# Patient Record
Sex: FEMALE | Race: WHITE | NOT HISPANIC OR LATINO | Employment: OTHER | URBAN - NONMETROPOLITAN AREA
[De-identification: names, ages, dates, MRNs, and addresses within clinical notes are randomized per-mention and may not be internally consistent; named-entity substitution may affect disease eponyms.]

---

## 2017-01-28 ENCOUNTER — HOSPITAL ENCOUNTER (EMERGENCY)
Facility: HOSPITAL | Age: 72
Discharge: HOME | End: 2017-01-28
Attending: EMERGENCY MEDICINE | Admitting: EMERGENCY MEDICINE

## 2017-01-28 ENCOUNTER — APPOINTMENT (OUTPATIENT)
Dept: GENERAL RADIOLOGY | Facility: HOSPITAL | Age: 72
End: 2017-01-28

## 2017-01-28 DIAGNOSIS — J20.8 ACUTE BRONCHITIS, VIRAL: Primary | ICD-10-CM

## 2017-01-28 LAB
FLUAV AG NPH QL: NEGATIVE
FLUBV AG NPH QL IA: NEGATIVE

## 2017-01-28 PROCEDURE — 99284 EMERGENCY DEPT VISIT MOD MDM: CPT

## 2017-01-28 PROCEDURE — 25010000002 DEXAMETHASONE PER 1 MG: Performed by: EMERGENCY MEDICINE

## 2017-01-28 PROCEDURE — 87804 INFLUENZA ASSAY W/OPTIC: CPT | Performed by: NURSE PRACTITIONER

## 2017-01-28 PROCEDURE — 71010 XR CHEST 1 VW: CPT | Performed by: RADIOLOGY

## 2017-01-28 PROCEDURE — 96372 THER/PROPH/DIAG INJ SC/IM: CPT

## 2017-01-28 PROCEDURE — 71010 HC CHEST PA OR AP: CPT

## 2017-01-28 PROCEDURE — 94640 AIRWAY INHALATION TREATMENT: CPT

## 2017-01-28 RX ORDER — PREDNISONE 20 MG/1
20 TABLET ORAL 2 TIMES DAILY
Qty: 10 TABLET | Refills: 0 | Status: SHIPPED | OUTPATIENT
Start: 2017-01-28 | End: 2017-02-02

## 2017-01-28 RX ORDER — GUAIFENESIN 600 MG/1
1200 TABLET, EXTENDED RELEASE ORAL 2 TIMES DAILY
Qty: 10 TABLET | Refills: 0 | Status: SHIPPED | OUTPATIENT
Start: 2017-01-28 | End: 2017-04-05

## 2017-01-28 RX ORDER — IPRATROPIUM BROMIDE AND ALBUTEROL SULFATE 2.5; .5 MG/3ML; MG/3ML
3 SOLUTION RESPIRATORY (INHALATION) ONCE
Status: COMPLETED | OUTPATIENT
Start: 2017-01-28 | End: 2017-01-28

## 2017-01-28 RX ORDER — DEXAMETHASONE SODIUM PHOSPHATE 4 MG/ML
8 INJECTION, SOLUTION INTRA-ARTICULAR; INTRALESIONAL; INTRAMUSCULAR; INTRAVENOUS; SOFT TISSUE ONCE
Status: DISCONTINUED | OUTPATIENT
Start: 2017-01-28 | End: 2017-01-28

## 2017-01-28 RX ORDER — ALBUTEROL SULFATE 90 UG/1
2 AEROSOL, METERED RESPIRATORY (INHALATION) EVERY 4 HOURS PRN
COMMUNITY
End: 2017-04-05

## 2017-01-28 RX ORDER — GUAIFENESIN 600 MG/1
600 TABLET, EXTENDED RELEASE ORAL ONCE
Status: COMPLETED | OUTPATIENT
Start: 2017-01-28 | End: 2017-01-28

## 2017-01-28 RX ORDER — METHYLPREDNISOLONE 4 MG/1
4 TABLET ORAL DAILY
COMMUNITY
End: 2017-04-05

## 2017-01-28 RX ORDER — DEXAMETHASONE SODIUM PHOSPHATE 4 MG/ML
8 INJECTION, SOLUTION INTRA-ARTICULAR; INTRALESIONAL; INTRAMUSCULAR; INTRAVENOUS; SOFT TISSUE ONCE
Status: COMPLETED | OUTPATIENT
Start: 2017-01-28 | End: 2017-01-28

## 2017-01-28 RX ADMIN — GUAIFENESIN 600 MG: 600 TABLET, EXTENDED RELEASE ORAL at 22:42

## 2017-01-28 RX ADMIN — IPRATROPIUM BROMIDE AND ALBUTEROL SULFATE 3 ML: .5; 3 SOLUTION RESPIRATORY (INHALATION) at 20:53

## 2017-01-28 RX ADMIN — HYDROCODONE POLISTIREX AND CHLORPHENIRAMINE POLISTIREX 5 ML: 10; 8 SUSPENSION, EXTENDED RELEASE ORAL at 20:56

## 2017-01-28 RX ADMIN — DEXAMETHASONE SODIUM PHOSPHATE 8 MG: 4 INJECTION, SOLUTION INTRAMUSCULAR; INTRAVENOUS at 22:29

## 2017-01-29 VITALS
SYSTOLIC BLOOD PRESSURE: 112 MMHG | HEIGHT: 64 IN | BODY MASS INDEX: 29.02 KG/M2 | TEMPERATURE: 98 F | RESPIRATION RATE: 18 BRPM | HEART RATE: 68 BPM | DIASTOLIC BLOOD PRESSURE: 72 MMHG | OXYGEN SATURATION: 99 % | WEIGHT: 170 LBS

## 2017-02-06 RX ORDER — AMITRIPTYLINE HYDROCHLORIDE 100 MG/1
100 TABLET, FILM COATED ORAL NIGHTLY
Qty: 30 TABLET | Refills: 2 | Status: SHIPPED | OUTPATIENT
Start: 2017-02-06 | End: 2017-03-06 | Stop reason: SDUPTHER

## 2017-02-13 ENCOUNTER — HOSPITAL ENCOUNTER (EMERGENCY)
Facility: HOSPITAL | Age: 72
Discharge: HOME | End: 2017-02-13
Attending: EMERGENCY MEDICINE | Admitting: EMERGENCY MEDICINE

## 2017-02-13 ENCOUNTER — APPOINTMENT (OUTPATIENT)
Dept: GENERAL RADIOLOGY | Facility: HOSPITAL | Age: 72
End: 2017-02-13

## 2017-02-13 VITALS
OXYGEN SATURATION: 99 % | TEMPERATURE: 97.7 F | SYSTOLIC BLOOD PRESSURE: 118 MMHG | HEIGHT: 64 IN | HEART RATE: 78 BPM | BODY MASS INDEX: 29.02 KG/M2 | RESPIRATION RATE: 16 BRPM | DIASTOLIC BLOOD PRESSURE: 58 MMHG | WEIGHT: 170 LBS

## 2017-02-13 DIAGNOSIS — R07.89 ATYPICAL CHEST PAIN: Primary | ICD-10-CM

## 2017-02-13 LAB
ALBUMIN SERPL-MCNC: 4 G/DL (ref 3.4–4.8)
ALBUMIN/GLOB SERPL: 1.3 G/DL (ref 1.5–2.5)
ALP SERPL-CCNC: 72 U/L (ref 46–116)
ALT SERPL W P-5'-P-CCNC: 27 U/L (ref 10–36)
ANION GAP SERPL CALCULATED.3IONS-SCNC: 4.6 MMOL/L (ref 3.6–11.2)
AST SERPL-CCNC: 26 U/L (ref 10–30)
BASOPHILS # BLD AUTO: 0.04 10*3/MM3 (ref 0–0.3)
BASOPHILS NFR BLD AUTO: 0.4 % (ref 0–2)
BILIRUB SERPL-MCNC: 0.3 MG/DL (ref 0.2–1.8)
BNP SERPL-MCNC: 69 PG/ML (ref 0–100)
BUN BLD-MCNC: 16 MG/DL (ref 7–21)
BUN/CREAT SERPL: 23.5 (ref 7–25)
CALCIUM SPEC-SCNC: 10.1 MG/DL (ref 7.7–10)
CHLORIDE SERPL-SCNC: 109 MMOL/L (ref 99–112)
CO2 SERPL-SCNC: 27.4 MMOL/L (ref 24.3–31.9)
CREAT BLD-MCNC: 0.68 MG/DL (ref 0.43–1.29)
DEPRECATED RDW RBC AUTO: 44.4 FL (ref 37–54)
EOSINOPHIL # BLD AUTO: 0.18 10*3/MM3 (ref 0–0.7)
EOSINOPHIL NFR BLD AUTO: 2 % (ref 0–7)
ERYTHROCYTE [DISTWIDTH] IN BLOOD BY AUTOMATED COUNT: 14.6 % (ref 11.5–14.5)
GFR SERPL CREATININE-BSD FRML MDRD: 85 ML/MIN/1.73
GLOBULIN UR ELPH-MCNC: 3 GM/DL
GLUCOSE BLD-MCNC: 98 MG/DL (ref 70–110)
HCT VFR BLD AUTO: 37.9 % (ref 37–47)
HGB BLD-MCNC: 12.4 G/DL (ref 12–16)
IMM GRANULOCYTES # BLD: 0.04 10*3/MM3 (ref 0–0.03)
IMM GRANULOCYTES NFR BLD: 0.4 % (ref 0–0.5)
LIPASE SERPL-CCNC: 30 U/L (ref 13–60)
LYMPHOCYTES # BLD AUTO: 3.27 10*3/MM3 (ref 1–3)
LYMPHOCYTES NFR BLD AUTO: 36.3 % (ref 16–46)
MCH RBC QN AUTO: 27.7 PG (ref 27–33)
MCHC RBC AUTO-ENTMCNC: 32.7 G/DL (ref 33–37)
MCV RBC AUTO: 84.6 FL (ref 80–94)
MONOCYTES # BLD AUTO: 0.65 10*3/MM3 (ref 0.1–0.9)
MONOCYTES NFR BLD AUTO: 7.2 % (ref 0–12)
NEUTROPHILS # BLD AUTO: 4.84 10*3/MM3 (ref 1.4–6.5)
NEUTROPHILS NFR BLD AUTO: 53.7 % (ref 40–75)
OSMOLALITY SERPL CALC.SUM OF ELEC: 282.4 MOSM/KG (ref 273–305)
PLATELET # BLD AUTO: 170 10*3/MM3 (ref 130–400)
PMV BLD AUTO: 10.9 FL (ref 6–10)
POTASSIUM BLD-SCNC: 4.2 MMOL/L (ref 3.5–5.3)
PROT SERPL-MCNC: 7 G/DL (ref 6–8)
RBC # BLD AUTO: 4.48 10*6/MM3 (ref 4.2–5.4)
SODIUM BLD-SCNC: 141 MMOL/L (ref 135–153)
TROPONIN I SERPL-MCNC: 0.01 NG/ML
WBC NRBC COR # BLD: 9.02 10*3/MM3 (ref 4.5–12.5)

## 2017-02-13 PROCEDURE — 99284 EMERGENCY DEPT VISIT MOD MDM: CPT

## 2017-02-13 PROCEDURE — 25010000002 MORPHINE PER 10 MG: Performed by: EMERGENCY MEDICINE

## 2017-02-13 PROCEDURE — 93005 ELECTROCARDIOGRAM TRACING: CPT | Performed by: EMERGENCY MEDICINE

## 2017-02-13 PROCEDURE — 93010 ELECTROCARDIOGRAM REPORT: CPT | Performed by: INTERNAL MEDICINE

## 2017-02-13 PROCEDURE — 25010000002 ONDANSETRON PER 1 MG: Performed by: EMERGENCY MEDICINE

## 2017-02-13 PROCEDURE — 85025 COMPLETE CBC W/AUTO DIFF WBC: CPT | Performed by: EMERGENCY MEDICINE

## 2017-02-13 PROCEDURE — 96374 THER/PROPH/DIAG INJ IV PUSH: CPT

## 2017-02-13 PROCEDURE — 96376 TX/PRO/DX INJ SAME DRUG ADON: CPT

## 2017-02-13 PROCEDURE — 96375 TX/PRO/DX INJ NEW DRUG ADDON: CPT

## 2017-02-13 PROCEDURE — 83690 ASSAY OF LIPASE: CPT | Performed by: EMERGENCY MEDICINE

## 2017-02-13 PROCEDURE — 84484 ASSAY OF TROPONIN QUANT: CPT | Performed by: EMERGENCY MEDICINE

## 2017-02-13 PROCEDURE — 80053 COMPREHEN METABOLIC PANEL: CPT | Performed by: EMERGENCY MEDICINE

## 2017-02-13 PROCEDURE — 71010 HC CHEST PA OR AP: CPT

## 2017-02-13 PROCEDURE — 71010 XR CHEST 1 VW: CPT | Performed by: RADIOLOGY

## 2017-02-13 PROCEDURE — 83880 ASSAY OF NATRIURETIC PEPTIDE: CPT | Performed by: EMERGENCY MEDICINE

## 2017-02-13 RX ORDER — SODIUM CHLORIDE 0.9 % (FLUSH) 0.9 %
10 SYRINGE (ML) INJECTION AS NEEDED
Status: DISCONTINUED | OUTPATIENT
Start: 2017-02-13 | End: 2017-02-13 | Stop reason: HOSPADM

## 2017-02-13 RX ORDER — ONDANSETRON 2 MG/ML
4 INJECTION INTRAMUSCULAR; INTRAVENOUS ONCE
Status: COMPLETED | OUTPATIENT
Start: 2017-02-13 | End: 2017-02-13

## 2017-02-13 RX ORDER — NAPROXEN 500 MG/1
500 TABLET ORAL 2 TIMES DAILY PRN
Qty: 18 TABLET | Refills: 0 | Status: ON HOLD | OUTPATIENT
Start: 2017-02-13 | End: 2017-09-29

## 2017-02-13 RX ADMIN — MORPHINE SULFATE 4 MG: 4 INJECTION, SOLUTION INTRAMUSCULAR; INTRAVENOUS at 17:35

## 2017-02-13 RX ADMIN — ONDANSETRON 4 MG: 2 INJECTION, SOLUTION INTRAMUSCULAR; INTRAVENOUS at 17:35

## 2017-02-13 RX ADMIN — MORPHINE SULFATE 4 MG: 4 INJECTION, SOLUTION INTRAMUSCULAR; INTRAVENOUS at 18:54

## 2017-02-13 NOTE — ED PROVIDER NOTES
Subjective   Patient is a 71 y.o. female presenting with chest pain.   History provided by:  Patient  Chest Pain   Pain quality: sharp    Pain radiates to:  Does not radiate  Pain severity:  Mild  Onset quality:  Sudden  Timing:  Sporadic  Progression:  Partially resolved  Chronicity:  Recurrent  Context: breathing and movement    Relieved by:  Leaning forward  Associated symptoms: shortness of breath    Associated symptoms: no abdominal pain and no fever        Review of Systems   Constitutional: Negative.  Negative for fever.   HENT: Negative.    Respiratory: Positive for shortness of breath.    Cardiovascular: Positive for chest pain.   Gastrointestinal: Negative.  Negative for abdominal pain.   Endocrine: Negative.    Genitourinary: Negative.  Negative for dysuria.   Skin: Negative.    Neurological: Negative.    Psychiatric/Behavioral: Negative.    All other systems reviewed and are negative.      Past Medical History   Diagnosis Date   • Anxiety    • Degenerative disorder of bone    • Depression    • Disease of thyroid gland    • Fibromyalgia    • Hyperlipidemia        Allergies   Allergen Reactions   • Procaine        Past Surgical History   Procedure Laterality Date   • Cardiac catheterization     • Cholecystectomy     • Hysterectomy     • Endoscopy N/A 7/5/2016     Procedure: ESOPHAGOGASTRODUODENOSCOPY;  Surgeon: Ziggy Sprague III, MD;  Location: [facility] OR;  Service:    • Endoscopy N/A 7/5/2016     Procedure: ESOPHAGOGASTRODUODENOSCOPY WITH DILATATION;  Surgeon: Ziggy Sprague III, MD;  Location: [facility] OR;  Service:    • Cardiac catheterization     • Appendectomy         Family History   Problem Relation Age of Onset   • Cancer Mother      Pancreatic cancer   • Cancer Father    • Heart disease Brother        Social History     Social History   • Marital status: Single     Spouse name: N/A   • Number of children: N/A   • Years of education: N/A     Social History Main Topics   • Smoking status:  Never Smoker   • Smokeless tobacco: Not on file   • Alcohol use No   • Drug use: No   • Sexual activity: Defer     Other Topics Concern   • Not on file     Social History Narrative   • No narrative on file           Objective   Physical Exam   Constitutional: She is oriented to person, place, and time. She appears well-developed and well-nourished. No distress.   HENT:   Head: Normocephalic and atraumatic.   Right Ear: External ear normal.   Left Ear: External ear normal.   Nose: Nose normal.   Eyes: Conjunctivae and EOM are normal. Pupils are equal, round, and reactive to light.   Neck: Normal range of motion. Neck supple. No JVD present. No tracheal deviation present.   Cardiovascular: Normal rate, regular rhythm and normal heart sounds.    No murmur heard.  Pulmonary/Chest: Effort normal and breath sounds normal. No respiratory distress. She has no wheezes.   Abdominal: Soft. Bowel sounds are normal. There is no tenderness.   Musculoskeletal: Normal range of motion. She exhibits no edema or deformity.   Neurological: She is alert and oriented to person, place, and time. No cranial nerve deficit.   Skin: Skin is warm and dry. No rash noted. She is not diaphoretic. No erythema. No pallor.   Psychiatric: She has a normal mood and affect. Her behavior is normal. Thought content normal.   Nursing note and vitals reviewed.      Procedures         ED Course  ED Course   Comment By Time   EKG 16:25 NSR, old inferior MI,moderate T abnormality, similar to old EKG Gabe Delaney MD 02/13 1819                  [facility]    Final diagnoses:   Atypical chest pain            Gabe Delaney MD  02/13/17 9185

## 2017-02-23 ENCOUNTER — TRANSCRIBE ORDERS (OUTPATIENT)
Dept: ADMINISTRATIVE | Facility: HOSPITAL | Age: 72
End: 2017-02-23

## 2017-02-23 DIAGNOSIS — S09.90XS HEADACHES DUE TO OLD HEAD INJURY: Primary | ICD-10-CM

## 2017-02-23 DIAGNOSIS — G44.309 HEADACHES DUE TO OLD HEAD INJURY: Primary | ICD-10-CM

## 2017-02-24 ENCOUNTER — HOSPITAL ENCOUNTER (EMERGENCY)
Facility: HOSPITAL | Age: 72
Discharge: HOME | End: 2017-02-24
Admitting: EMERGENCY MEDICINE

## 2017-02-24 ENCOUNTER — APPOINTMENT (OUTPATIENT)
Dept: CT IMAGING | Facility: HOSPITAL | Age: 72
End: 2017-02-24

## 2017-02-24 ENCOUNTER — APPOINTMENT (OUTPATIENT)
Dept: GENERAL RADIOLOGY | Facility: HOSPITAL | Age: 72
End: 2017-02-24

## 2017-02-24 VITALS
WEIGHT: 172 LBS | HEIGHT: 64 IN | BODY MASS INDEX: 29.37 KG/M2 | OXYGEN SATURATION: 99 % | TEMPERATURE: 98.1 F | SYSTOLIC BLOOD PRESSURE: 105 MMHG | RESPIRATION RATE: 20 BRPM | DIASTOLIC BLOOD PRESSURE: 66 MMHG | HEART RATE: 81 BPM

## 2017-02-24 DIAGNOSIS — G45.9 TRANSIENT CEREBRAL ISCHEMIA, UNSPECIFIED TYPE: Primary | ICD-10-CM

## 2017-02-24 LAB
A-A DO2: 4.6 MMHG (ref 0–300)
ALBUMIN SERPL-MCNC: 4 G/DL (ref 3.4–4.8)
ALBUMIN/GLOB SERPL: 1.5 G/DL (ref 1.5–2.5)
ALP SERPL-CCNC: 74 U/L (ref 35–104)
ALT SERPL W P-5'-P-CCNC: 30 U/L (ref 10–36)
AMPHET+METHAMPHET UR QL: NEGATIVE
ANION GAP SERPL CALCULATED.3IONS-SCNC: 2.8 MMOL/L (ref 3.6–11.2)
APTT PPP: <23 SECONDS (ref 24.4–31)
ARTERIAL PATENCY WRIST A: ABNORMAL
AST SERPL-CCNC: 31 U/L (ref 10–30)
ATMOSPHERIC PRESS: 723 MMHG
BARBITURATES UR QL SCN: NEGATIVE
BASE EXCESS BLDA CALC-SCNC: 1.2 MMOL/L
BASOPHILS # BLD AUTO: 0.02 10*3/MM3 (ref 0–0.3)
BASOPHILS NFR BLD AUTO: 0.3 % (ref 0–2)
BDY SITE: ABNORMAL
BENZODIAZ UR QL SCN: POSITIVE
BILIRUB SERPL-MCNC: 0.2 MG/DL (ref 0.2–1.8)
BILIRUB UR QL STRIP: NEGATIVE
BODY TEMPERATURE: 98.6 C
BUN BLD-MCNC: 21 MG/DL (ref 7–21)
BUN/CREAT SERPL: 26.9 (ref 7–25)
CALCIUM SPEC-SCNC: 9.1 MG/DL (ref 7.7–10)
CANNABINOIDS SERPL QL: NEGATIVE
CHLORIDE SERPL-SCNC: 107 MMOL/L (ref 99–112)
CLARITY UR: CLEAR
CO2 SERPL-SCNC: 30.2 MMOL/L (ref 24.3–31.9)
COCAINE UR QL: NEGATIVE
COHGB MFR BLD: 1.5 % (ref 0–5)
COLOR UR: YELLOW
CREAT BLD-MCNC: 0.78 MG/DL (ref 0.43–1.29)
DEPRECATED RDW RBC AUTO: 45.8 FL (ref 37–54)
EOSINOPHIL # BLD AUTO: 0.38 10*3/MM3 (ref 0–0.7)
EOSINOPHIL NFR BLD AUTO: 5.8 % (ref 0–7)
ERYTHROCYTE [DISTWIDTH] IN BLOOD BY AUTOMATED COUNT: 14.7 % (ref 11.5–14.5)
GFR SERPL CREATININE-BSD FRML MDRD: 73 ML/MIN/1.73
GLOBULIN UR ELPH-MCNC: 2.6 GM/DL
GLUCOSE BLD-MCNC: 118 MG/DL (ref 70–110)
GLUCOSE UR STRIP-MCNC: NEGATIVE MG/DL
HCO3 BLDA-SCNC: 27.7 MMOL/L (ref 22–26)
HCT VFR BLD AUTO: 37.3 % (ref 37–47)
HCT VFR BLD CALC: 37 % (ref 37–47)
HGB BLD-MCNC: 11.6 G/DL (ref 12–16)
HGB BLDA-MCNC: 12.5 G/DL (ref 12–16)
HGB UR QL STRIP.AUTO: NEGATIVE
HOROWITZ INDEX BLD+IHG-RTO: 21 %
IMM GRANULOCYTES # BLD: 0.03 10*3/MM3 (ref 0–0.03)
IMM GRANULOCYTES NFR BLD: 0.5 % (ref 0–0.5)
INR PPP: 0.89 (ref 0.8–1.1)
KETONES UR QL STRIP: NEGATIVE
LEUKOCYTE ESTERASE UR QL STRIP.AUTO: NEGATIVE
LYMPHOCYTES # BLD AUTO: 2.18 10*3/MM3 (ref 1–3)
LYMPHOCYTES NFR BLD AUTO: 33.5 % (ref 16–46)
MCH RBC QN AUTO: 27 PG (ref 27–33)
MCHC RBC AUTO-ENTMCNC: 31.1 G/DL (ref 33–37)
MCV RBC AUTO: 86.9 FL (ref 80–94)
METHADONE UR QL SCN: NEGATIVE
METHGB BLD QL: 0.2 % (ref 0–3)
MODALITY: ABNORMAL
MONOCYTES # BLD AUTO: 0.47 10*3/MM3 (ref 0.1–0.9)
MONOCYTES NFR BLD AUTO: 7.2 % (ref 0–12)
NEUTROPHILS # BLD AUTO: 3.42 10*3/MM3 (ref 1.4–6.5)
NEUTROPHILS NFR BLD AUTO: 52.7 % (ref 40–75)
NITRITE UR QL STRIP: NEGATIVE
OPIATES UR QL: POSITIVE
OSMOLALITY SERPL CALC.SUM OF ELEC: 283.5 MOSM/KG (ref 273–305)
OXYCODONE UR QL SCN: POSITIVE
OXYHGB MFR BLDV: 93.1 % (ref 85–100)
PCO2 BLDA: 52.1 MM HG (ref 35–45)
PCP UR QL SCN: NEGATIVE
PH BLDA: 7.34 PH UNITS (ref 7.35–7.45)
PH UR STRIP.AUTO: <=5 [PH] (ref 5–8)
PLATELET # BLD AUTO: 120 10*3/MM3 (ref 130–400)
PMV BLD AUTO: 11.9 FL (ref 6–10)
PO2 BLDA: 75 MM HG (ref 80–100)
POTASSIUM BLD-SCNC: 4 MMOL/L (ref 3.5–5.3)
PROPOXYPH UR QL: NEGATIVE
PROT SERPL-MCNC: 6.6 G/DL (ref 6–8)
PROT UR QL STRIP: NEGATIVE
PROTHROMBIN TIME: 10 SECONDS (ref 9.8–11.9)
RBC # BLD AUTO: 4.29 10*6/MM3 (ref 4.2–5.4)
SAO2 % BLDCOA: 94.7 % (ref 90–100)
SODIUM BLD-SCNC: 140 MMOL/L (ref 135–153)
SP GR UR STRIP: 1.02 (ref 1–1.03)
UROBILINOGEN UR QL STRIP: NORMAL
WBC NRBC COR # BLD: 6.5 10*3/MM3 (ref 4.5–12.5)

## 2017-02-24 PROCEDURE — 80307 DRUG TEST PRSMV CHEM ANLYZR: CPT | Performed by: NURSE PRACTITIONER

## 2017-02-24 PROCEDURE — 82375 ASSAY CARBOXYHB QUANT: CPT | Performed by: NURSE PRACTITIONER

## 2017-02-24 PROCEDURE — 70450 CT HEAD/BRAIN W/O DYE: CPT

## 2017-02-24 PROCEDURE — 99285 EMERGENCY DEPT VISIT HI MDM: CPT

## 2017-02-24 PROCEDURE — 82805 BLOOD GASES W/O2 SATURATION: CPT | Performed by: NURSE PRACTITIONER

## 2017-02-24 PROCEDURE — 85025 COMPLETE CBC W/AUTO DIFF WBC: CPT | Performed by: NURSE PRACTITIONER

## 2017-02-24 PROCEDURE — 85730 THROMBOPLASTIN TIME PARTIAL: CPT | Performed by: NURSE PRACTITIONER

## 2017-02-24 PROCEDURE — 93005 ELECTROCARDIOGRAM TRACING: CPT | Performed by: NURSE PRACTITIONER

## 2017-02-24 PROCEDURE — 81003 URINALYSIS AUTO W/O SCOPE: CPT | Performed by: NURSE PRACTITIONER

## 2017-02-24 PROCEDURE — 80053 COMPREHEN METABOLIC PANEL: CPT | Performed by: NURSE PRACTITIONER

## 2017-02-24 PROCEDURE — 36600 WITHDRAWAL OF ARTERIAL BLOOD: CPT | Performed by: NURSE PRACTITIONER

## 2017-02-24 PROCEDURE — 71010 HC CHEST AP: CPT

## 2017-02-24 PROCEDURE — 83050 HGB METHEMOGLOBIN QUAN: CPT | Performed by: NURSE PRACTITIONER

## 2017-02-24 PROCEDURE — 71010 XR CHEST AP: CPT | Performed by: RADIOLOGY

## 2017-02-24 PROCEDURE — 70450 CT HEAD/BRAIN W/O DYE: CPT | Performed by: RADIOLOGY

## 2017-02-24 PROCEDURE — 85610 PROTHROMBIN TIME: CPT | Performed by: NURSE PRACTITIONER

## 2017-02-24 RX ORDER — SODIUM CHLORIDE 0.9 % (FLUSH) 0.9 %
10 SYRINGE (ML) INJECTION AS NEEDED
Status: DISCONTINUED | OUTPATIENT
Start: 2017-02-24 | End: 2017-02-24 | Stop reason: HOSPADM

## 2017-02-25 ENCOUNTER — HOSPITAL ENCOUNTER (OUTPATIENT)
Dept: MRI IMAGING | Facility: HOSPITAL | Age: 72
Discharge: HOME | End: 2017-02-25
Attending: FAMILY MEDICINE | Admitting: FAMILY MEDICINE

## 2017-02-25 DIAGNOSIS — S09.90XS HEADACHES DUE TO OLD HEAD INJURY: ICD-10-CM

## 2017-02-25 DIAGNOSIS — G44.309 HEADACHES DUE TO OLD HEAD INJURY: ICD-10-CM

## 2017-02-25 PROCEDURE — 70551 MRI BRAIN STEM W/O DYE: CPT

## 2017-02-25 PROCEDURE — 70551 MRI BRAIN STEM W/O DYE: CPT | Performed by: RADIOLOGY

## 2017-03-01 ENCOUNTER — TELEPHONE (OUTPATIENT)
Dept: PSYCHIATRY | Facility: CLINIC | Age: 72
End: 2017-03-01

## 2017-03-01 NOTE — TELEPHONE ENCOUNTER
Indigo called and said that she missed her last appt with you due to her having a stroke and she still can't walk. She has made an appointment with you in April. She just wanted you to know why she didn't show for her appt.

## 2017-03-06 RX ORDER — QUETIAPINE FUMARATE 200 MG/1
200 TABLET, FILM COATED ORAL NIGHTLY
Qty: 30 TABLET | Refills: 2 | Status: SHIPPED | OUTPATIENT
Start: 2017-03-06 | End: 2017-06-06 | Stop reason: SDUPTHER

## 2017-03-06 RX ORDER — AMITRIPTYLINE HYDROCHLORIDE 100 MG/1
100 TABLET, FILM COATED ORAL NIGHTLY
Qty: 30 TABLET | Refills: 2 | Status: SHIPPED | OUTPATIENT
Start: 2017-03-06 | End: 2017-06-06 | Stop reason: SDUPTHER

## 2017-04-05 ENCOUNTER — OFFICE VISIT (OUTPATIENT)
Dept: PSYCHIATRY | Facility: CLINIC | Age: 72
End: 2017-04-05

## 2017-04-05 VITALS
HEIGHT: 64 IN | DIASTOLIC BLOOD PRESSURE: 67 MMHG | WEIGHT: 185 LBS | HEART RATE: 84 BPM | BODY MASS INDEX: 31.58 KG/M2 | SYSTOLIC BLOOD PRESSURE: 125 MMHG

## 2017-04-05 DIAGNOSIS — F41.1 GENERALIZED ANXIETY DISORDER: ICD-10-CM

## 2017-04-05 DIAGNOSIS — F33.3 MAJOR DEPRESSIVE DISORDER, RECURRENT, SEVERE WITH PSYCHOTIC FEATURES (HCC): Primary | ICD-10-CM

## 2017-04-05 PROCEDURE — 99214 OFFICE O/P EST MOD 30 MIN: CPT | Performed by: PSYCHIATRY & NEUROLOGY

## 2017-04-05 RX ORDER — POTASSIUM CHLORIDE 750 MG/1
TABLET, FILM COATED, EXTENDED RELEASE ORAL
Refills: 5 | Status: ON HOLD | COMMUNITY
Start: 2017-03-16 | End: 2017-09-29

## 2017-04-05 RX ORDER — RANITIDINE 150 MG/1
150 TABLET ORAL DAILY PRN
Refills: 3 | Status: ON HOLD | COMMUNITY
Start: 2017-02-02 | End: 2018-05-16

## 2017-04-05 RX ORDER — FUROSEMIDE 40 MG/1
40 TABLET ORAL DAILY
Refills: 5 | Status: ON HOLD | COMMUNITY
Start: 2017-03-16 | End: 2017-12-05

## 2017-04-05 NOTE — PROGRESS NOTES
"Subjective   Patient ID: Indigo Sorenson is a 71 y.o. female is here today for follow-up.    /67  Pulse 84  Ht 64\" (162.6 cm)  Wt 185 lb (83.9 kg)  BMI 31.76 kg/m2    Procaine    History of Present Illness The patient states that she is feeling stressed because of her son, who [facility] past [facility] issues. She is worried about him losing his [employer] benefits. She is blaming his girlfriend for it and she has caused too many problems and she is also using drugs and slipped bath salts to her son and he was admitted to the [facility] in [place]. She is worried that her son will lose his [employer] pension due to the legal issues and then his daughter will suffer as she may lose her college money from the [employer].  She is agreed to make an effort to not worry too much as he is an adult and he has to face consequences of his actions. She also agreed to continue her medications to help her during this difficult time.   The following portions of the patient's history were reviewed and updated as appropriate: allergies, current medications and problem list.    Review of Systems   Constitutional: Negative.    Eyes: Negative.    Respiratory: Negative.      PFSH: The patient lives in [facility] and has been there for last 9 years. Her son lives in [facility] but is currently incarcerated, and her granddaughter lives in [place], [place].    Objective   Mental Status Exam  Appearance:  clean and casually dressed, appropriate  Attitude toward clinician:  cooperative and agreeable   Speech:    Rate:  regular rate and rhythm   Volume:  normal  Motor:  no abnormal movements present  Mood:  Anxious  Affect:  euthymic  Thought Processes:  linear, logical, and goal directed  Thought Content:  normal  Suicidal Thoughts:  absent  Homicidal Thoughts:  absent  Perceptual Disturbance: no perceptual disturbance  Attention and Concentration:  good  Insight and Judgement:  good  Memory:  memory " appears to be intact    MEDICATION ISSUES:    Lab Review:   Admission on 02/24/2017, Discharged on 02/24/2017   Component Date Value   • Site 02/24/2017 Arterial: left brachial    • David's Test 02/24/2017 N/A    • pH, Arterial 02/24/2017 7.343*   • pCO2, Arterial 02/24/2017 52.1*   • pO2, Arterial 02/24/2017 75.0*   • HCO3, Arterial 02/24/2017 27.7*   • Base Excess, Arterial 02/24/2017 1.2    • O2 Saturation, Arterial 02/24/2017 94.7    • Hemoglobin, Blood Gas 02/24/2017 12.5    • Hematocrit, Blood Gas 02/24/2017 37.0    • Oxyhemoglobin 02/24/2017 93.1    • Methemoglobin 02/24/2017 0.2    • Carboxyhemoglobin 02/24/2017 1.5    • A-a Gradiant 02/24/2017 4.6    • Temperature 02/24/2017 98.6    • Barometric Pressure for * 02/24/2017 723    • Modality 02/24/2017 Room air    • FIO2 02/24/2017 21    • Glucose 02/24/2017 118*   • BUN 02/24/2017 21    • Creatinine 02/24/2017 0.78    • Sodium 02/24/2017 140    • Potassium 02/24/2017 4.0    • Chloride 02/24/2017 107    • CO2 02/24/2017 30.2    • Calcium 02/24/2017 9.1    • Total Protein 02/24/2017 6.6    • Albumin 02/24/2017 4.00    • ALT (SGPT) 02/24/2017 30    • AST (SGOT) 02/24/2017 31*   • Alkaline Phosphatase 02/24/2017 74    • Total Bilirubin 02/24/2017 0.2    • eGFR Non  Amer 02/24/2017 73    • Globulin 02/24/2017 2.6    • A/G Ratio 02/24/2017 1.5    • BUN/Creatinine Ratio 02/24/2017 26.9*   • Anion Gap 02/24/2017 2.8*   • Protime 02/24/2017 10.0    • INR 02/24/2017 0.89    • PTT 02/24/2017 <23.0*   • Color, UA 02/24/2017 Yellow    • Appearance, UA 02/24/2017 Clear    • pH, UA 02/24/2017 <=5.0    • Specific Gravity, UA 02/24/2017 1.025    • Glucose, UA 02/24/2017 Negative    • Ketones, UA 02/24/2017 Negative    • Bilirubin, UA 02/24/2017 Negative    • Blood, UA 02/24/2017 Negative    • Protein, UA 02/24/2017 Negative    • Leuk Esterase, UA 02/24/2017 Negative    • Nitrite, UA 02/24/2017 Negative    • Urobilinogen, UA 02/24/2017 0.2 E.U./dL    • Amphetamine  Screen, Urine 02/24/2017 Negative    • Barbiturates Screen, Uri* 02/24/2017 Negative    • Benzodiazepine Screen, U* 02/24/2017 Positive*   • Cocaine Screen, Urine 02/24/2017 Negative    • Methadone Screen, Urine 02/24/2017 Negative    • Opiate Screen 02/24/2017 Positive*   • Phencyclidine (PCP), Uri* 02/24/2017 Negative    • Propoxyphene Screen 02/24/2017 Negative    • THC, Screen, Urine 02/24/2017 Negative    • WBC 02/24/2017 6.50    • RBC 02/24/2017 4.29    • Hemoglobin 02/24/2017 11.6*   • Hematocrit 02/24/2017 37.3    • MCV 02/24/2017 86.9    • MCH 02/24/2017 27.0    • MCHC 02/24/2017 31.1*   • RDW 02/24/2017 14.7*   • RDW-SD 02/24/2017 45.8    • MPV 02/24/2017 11.9*   • Platelets 02/24/2017 120*   • Neutrophil % 02/24/2017 52.7    • Lymphocyte % 02/24/2017 33.5    • Monocyte % 02/24/2017 7.2    • Eosinophil % 02/24/2017 5.8    • Basophil % 02/24/2017 0.3    • Immature Grans % 02/24/2017 0.5    • Neutrophils, Absolute 02/24/2017 3.42    • Lymphocytes, Absolute 02/24/2017 2.18    • Monocytes, Absolute 02/24/2017 0.47    • Eosinophils, Absolute 02/24/2017 0.38    • Basophils, Absolute 02/24/2017 0.02    • Immature Grans, Absolute 02/24/2017 0.03    • Osmolality Calc 02/24/2017 283.5    • Oxycodone Screen, Urine 02/24/2017 Positive*   Admission on 02/13/2017, Discharged on 02/13/2017   Component Date Value   • Glucose 02/13/2017 98    • BUN 02/13/2017 16    • Creatinine 02/13/2017 0.68    • Sodium 02/13/2017 141    • Potassium 02/13/2017 4.2    • Chloride 02/13/2017 109    • CO2 02/13/2017 27.4    • Calcium 02/13/2017 10.1*   • Total Protein 02/13/2017 7.0    • Albumin 02/13/2017 4.00    • ALT (SGPT) 02/13/2017 27    • AST (SGOT) 02/13/2017 26    • Alkaline Phosphatase 02/13/2017 72    • Total Bilirubin 02/13/2017 0.3    • eGFR Non  Amer 02/13/2017 85    • Globulin 02/13/2017 3.0    • A/G Ratio 02/13/2017 1.3*   • BUN/Creatinine Ratio 02/13/2017 23.5    • Anion Gap 02/13/2017 4.6    • BNP 02/13/2017 69.0     • Lipase 02/13/2017 30    • Troponin I 02/13/2017 0.014    • WBC 02/13/2017 9.02    • RBC 02/13/2017 4.48    • Hemoglobin 02/13/2017 12.4    • Hematocrit 02/13/2017 37.9    • MCV 02/13/2017 84.6    • MCH 02/13/2017 27.7    • MCHC 02/13/2017 32.7*   • RDW 02/13/2017 14.6*   • RDW-SD 02/13/2017 44.4    • MPV 02/13/2017 10.9*   • Platelets 02/13/2017 170    • Neutrophil % 02/13/2017 53.7    • Lymphocyte % 02/13/2017 36.3    • Monocyte % 02/13/2017 7.2    • Eosinophil % 02/13/2017 2.0    • Basophil % 02/13/2017 0.4    • Immature Grans % 02/13/2017 0.4    • Neutrophils, Absolute 02/13/2017 4.84    • Lymphocytes, Absolute 02/13/2017 3.27*   • Monocytes, Absolute 02/13/2017 0.65    • Eosinophils, Absolute 02/13/2017 0.18    • Basophils, Absolute 02/13/2017 0.04    • Immature Grans, Absolute 02/13/2017 0.04*   • Osmolality Calc 02/13/2017 282.4      Assessment/Plan   Diagnoses and all orders for this visit:    Major depressive disorder, recurrent, severe with psychotic features    Generalized anxiety disorder      Return in about 2 months (around 6/5/2017).

## 2017-06-06 ENCOUNTER — OFFICE VISIT (OUTPATIENT)
Dept: PSYCHIATRY | Facility: CLINIC | Age: 72
End: 2017-06-06

## 2017-06-06 VITALS
DIASTOLIC BLOOD PRESSURE: 69 MMHG | SYSTOLIC BLOOD PRESSURE: 114 MMHG | HEIGHT: 64 IN | WEIGHT: 187 LBS | HEART RATE: 68 BPM | BODY MASS INDEX: 31.92 KG/M2

## 2017-06-06 DIAGNOSIS — F41.1 GENERALIZED ANXIETY DISORDER: ICD-10-CM

## 2017-06-06 DIAGNOSIS — F33.3 MAJOR DEPRESSIVE DISORDER, RECURRENT, SEVERE WITH PSYCHOTIC FEATURES (HCC): Primary | ICD-10-CM

## 2017-06-06 PROCEDURE — 99213 OFFICE O/P EST LOW 20 MIN: CPT | Performed by: PSYCHIATRY & NEUROLOGY

## 2017-06-06 RX ORDER — NEOMYCIN SULFATE, POLYMYXIN B SULFATE AND DEXAMETHASONE 3.5; 10000; 1 MG/ML; [USP'U]/ML; MG/ML
SUSPENSION/ DROPS OPHTHALMIC
Refills: 1 | Status: ON HOLD | COMMUNITY
Start: 2017-05-31 | End: 2017-09-29

## 2017-06-06 RX ORDER — LOTEPREDNOL ETABONATE 5 MG/G
GEL OPHTHALMIC
Refills: 0 | Status: ON HOLD | COMMUNITY
Start: 2017-05-31 | End: 2017-09-29

## 2017-06-06 RX ORDER — QUETIAPINE FUMARATE 300 MG/1
300 TABLET, FILM COATED ORAL NIGHTLY
Qty: 30 TABLET | Refills: 2 | Status: SHIPPED | OUTPATIENT
Start: 2017-06-06 | End: 2017-08-29 | Stop reason: SDUPTHER

## 2017-06-06 RX ORDER — AMITRIPTYLINE HYDROCHLORIDE 100 MG/1
100 TABLET, FILM COATED ORAL NIGHTLY
Qty: 30 TABLET | Refills: 2 | Status: SHIPPED | OUTPATIENT
Start: 2017-06-06 | End: 2017-08-29 | Stop reason: SDUPTHER

## 2017-06-06 RX ORDER — HYDROXYZINE PAMOATE 25 MG/1
CAPSULE ORAL
Refills: 5 | Status: ON HOLD | COMMUNITY
Start: 2017-05-22 | End: 2017-09-29

## 2017-06-06 NOTE — PROGRESS NOTES
"Subjective   Patient ID: Indigo Sorenson is a 72 y.o. female is here today for follow-up.    /69  Pulse 68  Ht 64\" (162.6 cm)  Wt 187 lb (84.8 kg)  BMI 32.1 kg/m2    Procaine    History of Present Illness The patient states that she is worried about her sons who are not doing well health wise and it is affecting her sleep and she doesn't want to be in the state where she will end up in the hospital. She is encouraged to accept her limitations and not try to fix things which are beyond her ability. She also agreed to increase the dose of Seroquel.  The following portions of the patient's history were reviewed and updated as appropriate: allergies, current medications and problem list.    Review of Systems   Constitutional: Negative.    Eyes: Negative.    Respiratory: Negative.      PFSH:     Objective   Mental Status Exam  Appearance:  clean and casually dressed, appropriate  Attitude toward clinician:  cooperative and agreeable   Speech:    Rate:  regular rate and rhythm   Volume:  normal  Motor:  no abnormal movements present  Mood:  Anxious  Affect:  euthymic  Thought Processes:  linear, logical, and goal directed  Thought Content:  normal  Suicidal Thoughts:  absent  Homicidal Thoughts:  absent  Perceptual Disturbance: no perceptual disturbance  Attention and Concentration:  good  Insight and Judgement:  good  Memory:  memory appears to be intact    MEDICATION ISSUES:    Lab Review:   No visits with results within 2 Month(s) from this visit.  Latest known visit with results is:    Admission on 02/24/2017, Discharged on 02/24/2017   Component Date Value   • Site 02/24/2017 Arterial: left brachial    • David's Test 02/24/2017 N/A    • pH, Arterial 02/24/2017 7.343*   • pCO2, Arterial 02/24/2017 52.1*   • pO2, Arterial 02/24/2017 75.0*   • HCO3, Arterial 02/24/2017 27.7*   • Base Excess, Arterial 02/24/2017 1.2    • O2 Saturation, Arterial 02/24/2017 94.7    • Hemoglobin, Blood Gas 02/24/2017 12.5    • " Hematocrit, Blood Gas 02/24/2017 37.0    • Oxyhemoglobin 02/24/2017 93.1    • Methemoglobin 02/24/2017 0.2    • Carboxyhemoglobin 02/24/2017 1.5    • A-a Gradiant 02/24/2017 4.6    • Temperature 02/24/2017 98.6    • Barometric Pressure for * 02/24/2017 723    • Modality 02/24/2017 Room air    • FIO2 02/24/2017 21    • Glucose 02/24/2017 118*   • BUN 02/24/2017 21    • Creatinine 02/24/2017 0.78    • Sodium 02/24/2017 140    • Potassium 02/24/2017 4.0    • Chloride 02/24/2017 107    • CO2 02/24/2017 30.2    • Calcium 02/24/2017 9.1    • Total Protein 02/24/2017 6.6    • Albumin 02/24/2017 4.00    • ALT (SGPT) 02/24/2017 30    • AST (SGOT) 02/24/2017 31*   • Alkaline Phosphatase 02/24/2017 74    • Total Bilirubin 02/24/2017 0.2    • eGFR Non  Amer 02/24/2017 73    • Globulin 02/24/2017 2.6    • A/G Ratio 02/24/2017 1.5    • BUN/Creatinine Ratio 02/24/2017 26.9*   • Anion Gap 02/24/2017 2.8*   • Protime 02/24/2017 10.0    • INR 02/24/2017 0.89    • PTT 02/24/2017 <23.0*   • Color, UA 02/24/2017 Yellow    • Appearance, UA 02/24/2017 Clear    • pH, UA 02/24/2017 <=5.0    • Specific Gravity, UA 02/24/2017 1.025    • Glucose, UA 02/24/2017 Negative    • Ketones, UA 02/24/2017 Negative    • Bilirubin, UA 02/24/2017 Negative    • Blood, UA 02/24/2017 Negative    • Protein, UA 02/24/2017 Negative    • Leuk Esterase, UA 02/24/2017 Negative    • Nitrite, UA 02/24/2017 Negative    • Urobilinogen, UA 02/24/2017 0.2 E.U./dL    • Amphetamine Screen, Urine 02/24/2017 Negative    • Barbiturates Screen, Uri* 02/24/2017 Negative    • Benzodiazepine Screen, U* 02/24/2017 Positive*   • Cocaine Screen, Urine 02/24/2017 Negative    • Methadone Screen, Urine 02/24/2017 Negative    • Opiate Screen 02/24/2017 Positive*   • Phencyclidine (PCP), Uri* 02/24/2017 Negative    • Propoxyphene Screen 02/24/2017 Negative    • THC, Screen, Urine 02/24/2017 Negative    • WBC 02/24/2017 6.50    • RBC 02/24/2017 4.29    • Hemoglobin 02/24/2017  11.6*   • Hematocrit 02/24/2017 37.3    • MCV 02/24/2017 86.9    • MCH 02/24/2017 27.0    • MCHC 02/24/2017 31.1*   • RDW 02/24/2017 14.7*   • RDW-SD 02/24/2017 45.8    • MPV 02/24/2017 11.9*   • Platelets 02/24/2017 120*   • Neutrophil % 02/24/2017 52.7    • Lymphocyte % 02/24/2017 33.5    • Monocyte % 02/24/2017 7.2    • Eosinophil % 02/24/2017 5.8    • Basophil % 02/24/2017 0.3    • Immature Grans % 02/24/2017 0.5    • Neutrophils, Absolute 02/24/2017 3.42    • Lymphocytes, Absolute 02/24/2017 2.18    • Monocytes, Absolute 02/24/2017 0.47    • Eosinophils, Absolute 02/24/2017 0.38    • Basophils, Absolute 02/24/2017 0.02    • Immature Grans, Absolute 02/24/2017 0.03    • Osmolality Calc 02/24/2017 283.5    • Oxycodone Screen, Urine 02/24/2017 Positive*     Assessment/Plan   Diagnoses and all orders for this visit:    Major depressive disorder, recurrent, severe with psychotic features    Generalized anxiety disorder    Other orders  -     QUEtiapine (SEROquel) 300 MG tablet; Take 1 tablet by mouth Every Night.  -     amitriptyline (ELAVIL) 100 MG tablet; Take 1 tablet by mouth Every Night.    Return in about 4 weeks (around 7/4/2017).

## 2017-06-10 ENCOUNTER — APPOINTMENT (OUTPATIENT)
Dept: GENERAL RADIOLOGY | Facility: HOSPITAL | Age: 72
End: 2017-06-10

## 2017-06-10 ENCOUNTER — HOSPITAL ENCOUNTER (EMERGENCY)
Facility: HOSPITAL | Age: 72
Discharge: HOME | End: 2017-06-10
Attending: EMERGENCY MEDICINE | Admitting: EMERGENCY MEDICINE

## 2017-06-10 VITALS
BODY MASS INDEX: 30.73 KG/M2 | HEIGHT: 64 IN | TEMPERATURE: 97.9 F | HEART RATE: 76 BPM | RESPIRATION RATE: 18 BRPM | WEIGHT: 180 LBS | SYSTOLIC BLOOD PRESSURE: 116 MMHG | OXYGEN SATURATION: 98 % | DIASTOLIC BLOOD PRESSURE: 79 MMHG

## 2017-06-10 DIAGNOSIS — R07.89 ATYPICAL CHEST PAIN: Primary | ICD-10-CM

## 2017-06-10 LAB
ALBUMIN SERPL-MCNC: 4.4 G/DL (ref 3.4–4.8)
ALBUMIN/GLOB SERPL: 1.3 G/DL (ref 1.5–2.5)
ALP SERPL-CCNC: 96 U/L (ref 35–104)
ALT SERPL W P-5'-P-CCNC: 31 U/L (ref 10–36)
ANION GAP SERPL CALCULATED.3IONS-SCNC: 4.5 MMOL/L (ref 3.6–11.2)
APTT PPP: <23 SECONDS (ref 24.4–31)
AST SERPL-CCNC: 33 U/L (ref 10–30)
BACTERIA UR QL AUTO: ABNORMAL /HPF
BASOPHILS # BLD AUTO: 0.05 10*3/MM3 (ref 0–0.3)
BASOPHILS NFR BLD AUTO: 0.6 % (ref 0–2)
BILIRUB SERPL-MCNC: 0.3 MG/DL (ref 0.2–1.8)
BILIRUB UR QL STRIP: NEGATIVE
BNP SERPL-MCNC: 67 PG/ML (ref 0–100)
BUN BLD-MCNC: 16 MG/DL (ref 7–21)
BUN/CREAT SERPL: 18.8 (ref 7–25)
CALCIUM SPEC-SCNC: 10 MG/DL (ref 7.7–10)
CHLORIDE SERPL-SCNC: 103 MMOL/L (ref 99–112)
CLARITY UR: CLEAR
CO2 SERPL-SCNC: 31.5 MMOL/L (ref 24.3–31.9)
COLOR UR: YELLOW
CREAT BLD-MCNC: 0.85 MG/DL (ref 0.43–1.29)
DEPRECATED RDW RBC AUTO: 41.3 FL (ref 37–54)
EOSINOPHIL # BLD AUTO: 0.24 10*3/MM3 (ref 0–0.7)
EOSINOPHIL NFR BLD AUTO: 3 % (ref 0–7)
ERYTHROCYTE [DISTWIDTH] IN BLOOD BY AUTOMATED COUNT: 13.7 % (ref 11.5–14.5)
GFR SERPL CREATININE-BSD FRML MDRD: 66 ML/MIN/1.73
GLOBULIN UR ELPH-MCNC: 3.4 GM/DL
GLUCOSE BLD-MCNC: 100 MG/DL (ref 70–110)
GLUCOSE UR STRIP-MCNC: NEGATIVE MG/DL
HCT VFR BLD AUTO: 41.6 % (ref 37–47)
HGB BLD-MCNC: 13.6 G/DL (ref 12–16)
HGB UR QL STRIP.AUTO: NEGATIVE
HYALINE CASTS UR QL AUTO: ABNORMAL /LPF
IMM GRANULOCYTES # BLD: 0.01 10*3/MM3 (ref 0–0.03)
IMM GRANULOCYTES NFR BLD: 0.1 % (ref 0–0.5)
INR PPP: 0.9 (ref 0.8–1.1)
KETONES UR QL STRIP: NEGATIVE
LEUKOCYTE ESTERASE UR QL STRIP.AUTO: ABNORMAL
LYMPHOCYTES # BLD AUTO: 2.51 10*3/MM3 (ref 1–3)
LYMPHOCYTES NFR BLD AUTO: 31.6 % (ref 16–46)
MCH RBC QN AUTO: 27.5 PG (ref 27–33)
MCHC RBC AUTO-ENTMCNC: 32.7 G/DL (ref 33–37)
MCV RBC AUTO: 84 FL (ref 80–94)
MONOCYTES # BLD AUTO: 0.54 10*3/MM3 (ref 0.1–0.9)
MONOCYTES NFR BLD AUTO: 6.8 % (ref 0–12)
NEUTROPHILS # BLD AUTO: 4.6 10*3/MM3 (ref 1.4–6.5)
NEUTROPHILS NFR BLD AUTO: 57.9 % (ref 40–75)
NITRITE UR QL STRIP: NEGATIVE
OSMOLALITY SERPL CALC.SUM OF ELEC: 278.8 MOSM/KG (ref 273–305)
PH UR STRIP.AUTO: <=5 [PH] (ref 5–8)
PLATELET # BLD AUTO: 111 10*3/MM3 (ref 130–400)
PMV BLD AUTO: 12.2 FL (ref 6–10)
POTASSIUM BLD-SCNC: 4.3 MMOL/L (ref 3.5–5.3)
PROT SERPL-MCNC: 7.8 G/DL (ref 6–8)
PROT UR QL STRIP: NEGATIVE
PROTHROMBIN TIME: 9.9 SECONDS (ref 9.8–11.9)
RBC # BLD AUTO: 4.95 10*6/MM3 (ref 4.2–5.4)
RBC # UR: ABNORMAL /HPF
REF LAB TEST METHOD: ABNORMAL
SODIUM BLD-SCNC: 139 MMOL/L (ref 135–153)
SP GR UR STRIP: 1.01 (ref 1–1.03)
SQUAMOUS #/AREA URNS HPF: ABNORMAL /HPF
TROPONIN I SERPL-MCNC: <0.006 NG/ML
TROPONIN I SERPL-MCNC: <0.006 NG/ML
UROBILINOGEN UR QL STRIP: ABNORMAL
WBC NRBC COR # BLD: 7.95 10*3/MM3 (ref 4.5–12.5)
WBC UR QL AUTO: ABNORMAL /HPF

## 2017-06-10 PROCEDURE — 83880 ASSAY OF NATRIURETIC PEPTIDE: CPT | Performed by: EMERGENCY MEDICINE

## 2017-06-10 PROCEDURE — 93010 ELECTROCARDIOGRAM REPORT: CPT | Performed by: INTERNAL MEDICINE

## 2017-06-10 PROCEDURE — 99284 EMERGENCY DEPT VISIT MOD MDM: CPT

## 2017-06-10 PROCEDURE — 25010000002 MORPHINE PER 10 MG: Performed by: EMERGENCY MEDICINE

## 2017-06-10 PROCEDURE — 85025 COMPLETE CBC W/AUTO DIFF WBC: CPT | Performed by: EMERGENCY MEDICINE

## 2017-06-10 PROCEDURE — 85610 PROTHROMBIN TIME: CPT | Performed by: EMERGENCY MEDICINE

## 2017-06-10 PROCEDURE — 96374 THER/PROPH/DIAG INJ IV PUSH: CPT

## 2017-06-10 PROCEDURE — 96375 TX/PRO/DX INJ NEW DRUG ADDON: CPT

## 2017-06-10 PROCEDURE — 93005 ELECTROCARDIOGRAM TRACING: CPT | Performed by: EMERGENCY MEDICINE

## 2017-06-10 PROCEDURE — 71010 HC CHEST PA OR AP: CPT

## 2017-06-10 PROCEDURE — 80053 COMPREHEN METABOLIC PANEL: CPT | Performed by: EMERGENCY MEDICINE

## 2017-06-10 PROCEDURE — 81001 URINALYSIS AUTO W/SCOPE: CPT | Performed by: EMERGENCY MEDICINE

## 2017-06-10 PROCEDURE — 71010 XR CHEST 1 VW: CPT | Performed by: RADIOLOGY

## 2017-06-10 PROCEDURE — 25010000002 MORPHINE SULFATE (PF) 2 MG/ML SOLUTION

## 2017-06-10 PROCEDURE — 25010000002 KETOROLAC TROMETHAMINE PER 15 MG: Performed by: EMERGENCY MEDICINE

## 2017-06-10 PROCEDURE — 85730 THROMBOPLASTIN TIME PARTIAL: CPT | Performed by: EMERGENCY MEDICINE

## 2017-06-10 PROCEDURE — 84484 ASSAY OF TROPONIN QUANT: CPT | Performed by: EMERGENCY MEDICINE

## 2017-06-10 RX ORDER — KETOROLAC TROMETHAMINE 30 MG/ML
15 INJECTION, SOLUTION INTRAMUSCULAR; INTRAVENOUS ONCE
Status: COMPLETED | OUTPATIENT
Start: 2017-06-10 | End: 2017-06-10

## 2017-06-10 RX ORDER — SODIUM CHLORIDE 0.9 % (FLUSH) 0.9 %
10 SYRINGE (ML) INJECTION AS NEEDED
Status: DISCONTINUED | OUTPATIENT
Start: 2017-06-10 | End: 2017-06-10 | Stop reason: HOSPADM

## 2017-06-10 RX ORDER — MORPHINE SULFATE 2 MG/ML
INJECTION, SOLUTION INTRAMUSCULAR; INTRAVENOUS
Status: DISCONTINUED
Start: 2017-06-10 | End: 2017-06-10 | Stop reason: HOSPADM

## 2017-06-10 RX ORDER — OXYCODONE AND ACETAMINOPHEN 10; 325 MG/1; MG/1
1 TABLET ORAL ONCE
Status: COMPLETED | OUTPATIENT
Start: 2017-06-10 | End: 2017-06-10

## 2017-06-10 RX ORDER — CYCLOBENZAPRINE HCL 5 MG
5 TABLET ORAL 3 TIMES DAILY PRN
Qty: 15 TABLET | Refills: 0 | Status: ON HOLD | OUTPATIENT
Start: 2017-06-10 | End: 2017-09-29

## 2017-06-10 RX ADMIN — OXYCODONE HYDROCHLORIDE AND ACETAMINOPHEN 1 TABLET: 10; 325 TABLET ORAL at 18:24

## 2017-06-10 RX ADMIN — KETOROLAC TROMETHAMINE 15 MG: 30 INJECTION, SOLUTION INTRAMUSCULAR; INTRAVENOUS at 19:15

## 2017-06-10 RX ADMIN — MORPHINE SULFATE 4 MG: 4 INJECTION, SOLUTION INTRAMUSCULAR; INTRAVENOUS at 15:58

## 2017-06-10 NOTE — ED NOTES
Pt reports she takes Percocet 10 mg at home and states it is time for her to take her medication.  Dr. Delaney notified.     Edna Krishna RN  06/10/17 0691

## 2017-06-27 NOTE — ED PROVIDER NOTES
Subjective   Patient is a 72 y.o. female presenting with chest pain.   History provided by:  Patient  Chest Pain   Pain location:  Unable to specify  Pain radiates to:  Does not radiate  Pain severity:  Mild  Onset quality:  Gradual  Duration: 6 hours.  Progression:  Waxing and waning  Chronicity:  New  Context: breathing and movement    Relieved by:  Nothing  Worsened by:  Movement and deep breathing  Associated symptoms: no abdominal pain, no anorexia, no anxiety and no fever        Review of Systems   Constitutional: Negative.  Negative for fever.   HENT: Negative.    Respiratory: Negative.    Cardiovascular: Positive for chest pain.   Gastrointestinal: Negative.  Negative for abdominal pain and anorexia.   Endocrine: Negative.    Genitourinary: Negative.  Negative for dysuria.   Skin: Negative.    Neurological: Negative.    Psychiatric/Behavioral: Negative.    All other systems reviewed and are negative.      Past Medical History:   Diagnosis Date   • Anxiety    • Degenerative disorder of bone    • Depression    • Disease of thyroid gland    • Fibromyalgia    • Hyperlipidemia        Allergies   Allergen Reactions   • Procaine        Past Surgical History:   Procedure Laterality Date   • APPENDECTOMY     • CARDIAC CATHETERIZATION     • CARDIAC CATHETERIZATION     • CHOLECYSTECTOMY     • ENDOSCOPY N/A 7/5/2016    Procedure: ESOPHAGOGASTRODUODENOSCOPY;  Surgeon: Ziggy Sprague III, MD;  Location: [facility];  Service:    • ENDOSCOPY N/A 7/5/2016    Procedure: ESOPHAGOGASTRODUODENOSCOPY WITH DILATATION;  Surgeon: Ziggy Sprague III, MD;  Location: [facility];  Service:    • HYSTERECTOMY         Family History   Problem Relation Age of Onset   • Cancer Mother      Pancreatic cancer   • Cancer Father    • Heart disease Brother        Social History     Social History   • Marital status: Single     Spouse name: N/A   • Number of children: N/A   • Years of education: N/A     Social History Main Topics   •  Smoking status: Never Smoker   • Smokeless tobacco: None   • Alcohol use No   • Drug use: No   • Sexual activity: Defer     Other Topics Concern   • None     Social History Narrative           Objective   Physical Exam   Constitutional: She is oriented to person, place, and time. She appears well-developed and well-nourished. No distress.   HENT:   Head: Normocephalic and atraumatic.   Right Ear: External ear normal.   Left Ear: External ear normal.   Nose: Nose normal.   Eyes: Conjunctivae and EOM are normal. Pupils are equal, round, and reactive to light.   Neck: Normal range of motion. Neck supple. No JVD present. No tracheal deviation present.   Cardiovascular: Normal rate, regular rhythm and normal heart sounds.    No murmur heard.  Pulmonary/Chest: Effort normal and breath sounds normal. No respiratory distress. She has no wheezes.   Abdominal: Soft. Bowel sounds are normal. There is no tenderness.   Musculoskeletal: Normal range of motion. She exhibits no edema or deformity.   Neurological: She is alert and oriented to person, place, and time. No cranial nerve deficit.   Skin: Skin is warm and dry. No rash noted. She is not diaphoretic. No erythema. No pallor.   Psychiatric: She has a normal mood and affect. Her behavior is normal. Thought content normal.   Nursing note and vitals reviewed.      Procedures         ED Course  ED Course                  MDM    Final diagnoses:   Atypical chest pain            Gabe Delaney MD  06/27/17 9614

## 2017-07-12 ENCOUNTER — OFFICE VISIT (OUTPATIENT)
Dept: PSYCHIATRY | Facility: CLINIC | Age: 72
End: 2017-07-12

## 2017-07-12 VITALS
WEIGHT: 186 LBS | HEIGHT: 64 IN | HEART RATE: 70 BPM | BODY MASS INDEX: 31.76 KG/M2 | SYSTOLIC BLOOD PRESSURE: 121 MMHG | DIASTOLIC BLOOD PRESSURE: 73 MMHG

## 2017-07-12 DIAGNOSIS — F33.3 MAJOR DEPRESSIVE DISORDER, RECURRENT, SEVERE WITH PSYCHOTIC FEATURES (HCC): Primary | ICD-10-CM

## 2017-07-12 DIAGNOSIS — F41.1 GENERALIZED ANXIETY DISORDER: ICD-10-CM

## 2017-07-12 PROCEDURE — 99213 OFFICE O/P EST LOW 20 MIN: CPT | Performed by: PSYCHIATRY & NEUROLOGY

## 2017-07-12 RX ORDER — MIRTAZAPINE 7.5 MG/1
7.5 TABLET, FILM COATED ORAL NIGHTLY
Qty: 30 TABLET | Refills: 0 | Status: SHIPPED | OUTPATIENT
Start: 2017-07-12 | End: 2017-08-11

## 2017-07-12 RX ORDER — MIRTAZAPINE 15 MG/1
15 TABLET, ORALLY DISINTEGRATING ORAL NIGHTLY
Qty: 30 TABLET | Refills: 0 | Status: SHIPPED | OUTPATIENT
Start: 2017-07-12 | End: 2017-07-12

## 2017-07-12 NOTE — PROGRESS NOTES
"Subjective   Patient ID: Indigo Sorenson is a 72 y.o. female is here today for follow-up.    /73  Pulse 70  Ht 64\" (162.6 cm)  Wt 186 lb (84.4 kg)  BMI 31.93 kg/m2    Procaine    History of Present Illness The patient states that she is not able to sleep good and has difficulty falling asleep and then wakes up early. She denies any current stressors. She agreed to add Remeron to her medication regimen.  The following portions of the patient's history were reviewed and updated as appropriate: allergies, current medications and problem list.    Review of Systems   Constitutional: Negative.    Eyes: Negative.    Respiratory: Negative.      PFSH:     Objective   Mental Status Exam  Appearance:  clean and casually dressed, appropriate  Attitude toward clinician:  cooperative and agreeable   Speech:    Rate:  regular rate and rhythm   Volume:  normal  Motor:  no abnormal movements present  Mood:  Anxious  Affect:  euthymic  Thought Processes:  linear, logical, and goal directed  Thought Content:  normal  Suicidal Thoughts:  absent  Homicidal Thoughts:  absent  Perceptual Disturbance: no perceptual disturbance  Attention and Concentration:  good  Insight and Judgement:  good  Memory:  memory appears to be intact    MEDICATION ISSUES:    Lab Review:   Admission on 06/10/2017, Discharged on 06/10/2017   Component Date Value   • Glucose 06/10/2017 100    • BUN 06/10/2017 16    • Creatinine 06/10/2017 0.85    • Sodium 06/10/2017 139    • Potassium 06/10/2017 4.3    • Chloride 06/10/2017 103    • CO2 06/10/2017 31.5    • Calcium 06/10/2017 10.0    • Total Protein 06/10/2017 7.8    • Albumin 06/10/2017 4.40    • ALT (SGPT) 06/10/2017 31    • AST (SGOT) 06/10/2017 33*   • Alkaline Phosphatase 06/10/2017 96    • Total Bilirubin 06/10/2017 0.3    • eGFR Non  Amer 06/10/2017 66    • Globulin 06/10/2017 3.4    • A/G Ratio 06/10/2017 1.3*   • BUN/Creatinine Ratio 06/10/2017 18.8    • Anion Gap 06/10/2017 4.5    • Protime " 06/10/2017 9.9    • INR 06/10/2017 0.90    • PTT 06/10/2017 <23.0*   • BNP 06/10/2017 67.0    • Color, UA 06/10/2017 Yellow    • Appearance, UA 06/10/2017 Clear    • pH, UA 06/10/2017 <=5.0    • Specific Gravity, UA 06/10/2017 1.010    • Glucose, UA 06/10/2017 Negative    • Ketones, UA 06/10/2017 Negative    • Bilirubin, UA 06/10/2017 Negative    • Blood, UA 06/10/2017 Negative    • Protein, UA 06/10/2017 Negative    • Leuk Esterase, UA 06/10/2017 Trace*   • Nitrite, UA 06/10/2017 Negative    • Urobilinogen, UA 06/10/2017 0.2 E.U./dL    • Troponin I 06/10/2017 <0.006    • WBC 06/10/2017 7.95    • RBC 06/10/2017 4.95    • Hemoglobin 06/10/2017 13.6    • Hematocrit 06/10/2017 41.6    • MCV 06/10/2017 84.0    • MCH 06/10/2017 27.5    • MCHC 06/10/2017 32.7*   • RDW 06/10/2017 13.7    • RDW-SD 06/10/2017 41.3    • MPV 06/10/2017 12.2*   • Platelets 06/10/2017 111*   • Neutrophil % 06/10/2017 57.9    • Lymphocyte % 06/10/2017 31.6    • Monocyte % 06/10/2017 6.8    • Eosinophil % 06/10/2017 3.0    • Basophil % 06/10/2017 0.6    • Immature Grans % 06/10/2017 0.1    • Neutrophils, Absolute 06/10/2017 4.60    • Lymphocytes, Absolute 06/10/2017 2.51    • Monocytes, Absolute 06/10/2017 0.54    • Eosinophils, Absolute 06/10/2017 0.24    • Basophils, Absolute 06/10/2017 0.05    • Immature Grans, Absolute 06/10/2017 0.01    • Osmolality Calc 06/10/2017 278.8    • RBC, UA 06/10/2017 0-2*   • WBC, UA 06/10/2017 0-2*   • Bacteria, UA 06/10/2017 None Seen    • Squamous Epithelial Cell* 06/10/2017 0-2    • Hyaline Casts, UA 06/10/2017 None Seen    • Methodology 06/10/2017 Automated Microscopy    • Troponin I 06/10/2017 <0.006      Assessment/Plan   Diagnoses and all orders for this visit:    Major depressive disorder, recurrent, severe with psychotic features    Generalized anxiety disorder    Other orders  -     Discontinue: mirtazapine (REMERON SOL-TAB) 15 MG disintegrating tablet; Take 1 tablet by mouth Every Night.  -      mirtazapine (REMERON) 7.5 MG tablet; Take 1 tablet by mouth Every Night for 30 days.      Return in about 4 weeks (around 8/9/2017).

## 2017-08-29 RX ORDER — AMITRIPTYLINE HYDROCHLORIDE 100 MG/1
100 TABLET, FILM COATED ORAL NIGHTLY
Qty: 30 TABLET | Refills: 2 | Status: SHIPPED | OUTPATIENT
Start: 2017-08-29 | End: 2017-12-12 | Stop reason: SDUPTHER

## 2017-08-29 RX ORDER — MIRTAZAPINE 7.5 MG/1
7.5 TABLET, FILM COATED ORAL NIGHTLY
Qty: 30 TABLET | Refills: 2 | Status: SHIPPED | OUTPATIENT
Start: 2017-08-29 | End: 2017-12-12 | Stop reason: SDUPTHER

## 2017-08-29 RX ORDER — QUETIAPINE FUMARATE 300 MG/1
300 TABLET, FILM COATED ORAL NIGHTLY
Qty: 30 TABLET | Refills: 2 | Status: SHIPPED | OUTPATIENT
Start: 2017-08-29 | End: 2017-12-12 | Stop reason: SDUPTHER

## 2017-08-29 NOTE — TELEPHONE ENCOUNTER
Discontinue: mirtazapine (REMERON SOL-TAB) 15 MG disintegrating tablet; Take 1 tablet by mouth Every Night.  -     mirtazapine (REMERON) 7.5 MG tablet; Take 1 tablet by mouth Every Night for 30 days.        Patient requested Remeron 7.5mg last office note stated you decreased dose.

## 2017-09-19 ENCOUNTER — HOSPITAL ENCOUNTER (EMERGENCY)
Facility: HOSPITAL | Age: 72
Discharge: HOME | End: 2017-09-19
Attending: EMERGENCY MEDICINE | Admitting: EMERGENCY MEDICINE

## 2017-09-19 ENCOUNTER — APPOINTMENT (OUTPATIENT)
Dept: GENERAL RADIOLOGY | Facility: HOSPITAL | Age: 72
End: 2017-09-19

## 2017-09-19 VITALS
SYSTOLIC BLOOD PRESSURE: 136 MMHG | TEMPERATURE: 98.1 F | RESPIRATION RATE: 16 BRPM | WEIGHT: 184 LBS | HEIGHT: 64 IN | BODY MASS INDEX: 31.41 KG/M2 | HEART RATE: 73 BPM | DIASTOLIC BLOOD PRESSURE: 83 MMHG | OXYGEN SATURATION: 100 %

## 2017-09-19 DIAGNOSIS — R55 NEAR SYNCOPE: Primary | ICD-10-CM

## 2017-09-19 LAB
ALBUMIN SERPL-MCNC: 4.2 G/DL (ref 3.4–4.8)
ALBUMIN/GLOB SERPL: 1.5 G/DL (ref 1.5–2.5)
ALP SERPL-CCNC: 94 U/L (ref 35–104)
ALT SERPL W P-5'-P-CCNC: 30 U/L (ref 10–36)
AMYLASE SERPL-CCNC: 64 U/L (ref 28–100)
ANION GAP SERPL CALCULATED.3IONS-SCNC: 6.4 MMOL/L (ref 3.6–11.2)
AST SERPL-CCNC: 29 U/L (ref 10–30)
BASOPHILS # BLD AUTO: 0.03 10*3/MM3 (ref 0–0.3)
BASOPHILS NFR BLD AUTO: 0.3 % (ref 0–2)
BILIRUB SERPL-MCNC: 0.2 MG/DL (ref 0.2–1.8)
BUN BLD-MCNC: 13 MG/DL (ref 7–21)
BUN/CREAT SERPL: 16.5 (ref 7–25)
CALCIUM SPEC-SCNC: 9.3 MG/DL (ref 7.7–10)
CHLORIDE SERPL-SCNC: 107 MMOL/L (ref 99–112)
CK MB SERPL-CCNC: 1.75 NG/ML (ref 0–5)
CO2 SERPL-SCNC: 23.6 MMOL/L (ref 24.3–31.9)
CREAT BLD-MCNC: 0.79 MG/DL (ref 0.43–1.29)
DEPRECATED RDW RBC AUTO: 45.4 FL (ref 37–54)
EOSINOPHIL # BLD AUTO: 0.37 10*3/MM3 (ref 0–0.7)
EOSINOPHIL NFR BLD AUTO: 4 % (ref 0–7)
ERYTHROCYTE [DISTWIDTH] IN BLOOD BY AUTOMATED COUNT: 14.6 % (ref 11.5–14.5)
GFR SERPL CREATININE-BSD FRML MDRD: 72 ML/MIN/1.73
GLOBULIN UR ELPH-MCNC: 2.8 GM/DL
GLUCOSE BLD-MCNC: 96 MG/DL (ref 70–110)
HCT VFR BLD AUTO: 37.3 % (ref 37–47)
HGB BLD-MCNC: 12.2 G/DL (ref 12–16)
IMM GRANULOCYTES # BLD: 0.07 10*3/MM3 (ref 0–0.03)
IMM GRANULOCYTES NFR BLD: 0.8 % (ref 0–0.5)
LIPASE SERPL-CCNC: 36 U/L (ref 13–60)
LYMPHOCYTES # BLD AUTO: 3.9 10*3/MM3 (ref 1–3)
LYMPHOCYTES NFR BLD AUTO: 42 % (ref 16–46)
MCH RBC QN AUTO: 27.6 PG (ref 27–33)
MCHC RBC AUTO-ENTMCNC: 32.7 G/DL (ref 33–37)
MCV RBC AUTO: 84.4 FL (ref 80–94)
MONOCYTES # BLD AUTO: 0.54 10*3/MM3 (ref 0.1–0.9)
MONOCYTES NFR BLD AUTO: 5.8 % (ref 0–12)
NEUTROPHILS # BLD AUTO: 4.37 10*3/MM3 (ref 1.4–6.5)
NEUTROPHILS NFR BLD AUTO: 47.1 % (ref 40–75)
OSMOLALITY SERPL CALC.SUM OF ELEC: 273.8 MOSM/KG (ref 273–305)
PLATELET # BLD AUTO: 159 10*3/MM3 (ref 130–400)
PMV BLD AUTO: 12.2 FL (ref 6–10)
POTASSIUM BLD-SCNC: 3.7 MMOL/L (ref 3.5–5.3)
PROT SERPL-MCNC: 7 G/DL (ref 6–8)
RBC # BLD AUTO: 4.42 10*6/MM3 (ref 4.2–5.4)
SODIUM BLD-SCNC: 137 MMOL/L (ref 135–153)
TROPONIN I SERPL-MCNC: <0.006 NG/ML
WBC NRBC COR # BLD: 9.28 10*3/MM3 (ref 4.5–12.5)

## 2017-09-19 PROCEDURE — 93005 ELECTROCARDIOGRAM TRACING: CPT | Performed by: EMERGENCY MEDICINE

## 2017-09-19 PROCEDURE — 93010 ELECTROCARDIOGRAM REPORT: CPT | Performed by: INTERNAL MEDICINE

## 2017-09-19 PROCEDURE — 71010 HC CHEST PA OR AP: CPT

## 2017-09-19 PROCEDURE — 85025 COMPLETE CBC W/AUTO DIFF WBC: CPT | Performed by: EMERGENCY MEDICINE

## 2017-09-19 PROCEDURE — 84484 ASSAY OF TROPONIN QUANT: CPT | Performed by: EMERGENCY MEDICINE

## 2017-09-19 PROCEDURE — 96374 THER/PROPH/DIAG INJ IV PUSH: CPT

## 2017-09-19 PROCEDURE — 99284 EMERGENCY DEPT VISIT MOD MDM: CPT

## 2017-09-19 PROCEDURE — 94640 AIRWAY INHALATION TREATMENT: CPT

## 2017-09-19 PROCEDURE — 82150 ASSAY OF AMYLASE: CPT | Performed by: EMERGENCY MEDICINE

## 2017-09-19 PROCEDURE — 82553 CREATINE MB FRACTION: CPT | Performed by: EMERGENCY MEDICINE

## 2017-09-19 PROCEDURE — 80053 COMPREHEN METABOLIC PANEL: CPT | Performed by: EMERGENCY MEDICINE

## 2017-09-19 PROCEDURE — 71010 XR CHEST 1 VW: CPT | Performed by: RADIOLOGY

## 2017-09-19 PROCEDURE — 83690 ASSAY OF LIPASE: CPT | Performed by: EMERGENCY MEDICINE

## 2017-09-19 PROCEDURE — 94799 UNLISTED PULMONARY SVC/PX: CPT

## 2017-09-19 PROCEDURE — 25010000002 MORPHINE PER 10 MG: Performed by: EMERGENCY MEDICINE

## 2017-09-19 RX ORDER — IPRATROPIUM BROMIDE AND ALBUTEROL SULFATE 2.5; .5 MG/3ML; MG/3ML
3 SOLUTION RESPIRATORY (INHALATION) ONCE
Status: COMPLETED | OUTPATIENT
Start: 2017-09-19 | End: 2017-09-19

## 2017-09-19 RX ORDER — SODIUM CHLORIDE 0.9 % (FLUSH) 0.9 %
10 SYRINGE (ML) INJECTION AS NEEDED
Status: DISCONTINUED | OUTPATIENT
Start: 2017-09-19 | End: 2017-09-20 | Stop reason: HOSPADM

## 2017-09-19 RX ADMIN — MORPHINE SULFATE 4 MG: 4 INJECTION, SOLUTION INTRAMUSCULAR; INTRAVENOUS at 22:44

## 2017-09-19 RX ADMIN — IPRATROPIUM BROMIDE AND ALBUTEROL SULFATE 3 ML: .5; 3 SOLUTION RESPIRATORY (INHALATION) at 22:01

## 2017-09-20 NOTE — ED PROVIDER NOTES
Subjective   Patient is a 72 y.o. female presenting with syncope.   Syncope   Episode history:  Single  Most recent episode:  Today  Progression:  Resolved  Chronicity:  Recurrent  Context: standing up    Relieved by:  Lying down  Worsened by:  Posture  Ineffective treatments:  None tried  Associated symptoms: anxiety    Associated symptoms: no chest pain and no fever        Review of Systems   Constitutional: Negative.  Negative for fever.   HENT: Negative.    Respiratory: Negative.    Cardiovascular: Positive for syncope. Negative for chest pain.   Gastrointestinal: Negative.  Negative for abdominal pain.   Endocrine: Negative.    Genitourinary: Negative.  Negative for dysuria.   Skin: Negative.    Psychiatric/Behavioral: The patient is nervous/anxious.    All other systems reviewed and are negative.      Past Medical History:   Diagnosis Date   • Anxiety    • Degenerative disorder of bone    • Depression    • Disease of thyroid gland    • Fibromyalgia    • Hyperlipidemia        Allergies   Allergen Reactions   • Procaine        Past Surgical History:   Procedure Laterality Date   • APPENDECTOMY     • CARDIAC CATHETERIZATION     • CARDIAC CATHETERIZATION     • CHOLECYSTECTOMY     • ENDOSCOPY N/A 7/5/2016    Procedure: ESOPHAGOGASTRODUODENOSCOPY;  Surgeon: Ziggy Sprague III, MD;  Location: [facility];  Service:    • ENDOSCOPY N/A 7/5/2016    Procedure: ESOPHAGOGASTRODUODENOSCOPY WITH DILATATION;  Surgeon: Ziggy Sprague III, MD;  Location: [facility];  Service:    • HYSTERECTOMY         Family History   Problem Relation Age of Onset   • Cancer Mother      Pancreatic cancer   • Cancer Father    • Heart disease Brother        Social History     Social History   • Marital status: Single     Spouse name: N/A   • Number of children: N/A   • Years of education: N/A     Social History Main Topics   • Smoking status: Never Smoker   • Smokeless tobacco: Not on file   • Alcohol use No   • Drug use: No   • Sexual  activity: Defer     Other Topics Concern   • Not on file     Social History Narrative           Objective   Physical Exam   Constitutional: She is oriented to person, place, and time. She appears well-developed and well-nourished. No distress.   HENT:   Head: Normocephalic and atraumatic.   Right Ear: External ear normal.   Left Ear: External ear normal.   Nose: Nose normal.   Eyes: Conjunctivae and EOM are normal. Pupils are equal, round, and reactive to light.   Neck: Normal range of motion. Neck supple. No JVD present. No tracheal deviation present.   Cardiovascular: Normal rate, regular rhythm and normal heart sounds.    No murmur heard.  Pulmonary/Chest: Effort normal and breath sounds normal. No respiratory distress. She has no wheezes.   Abdominal: Soft. Bowel sounds are normal. There is no tenderness.   Musculoskeletal: Normal range of motion. She exhibits no edema or deformity.   Neurological: She is alert and oriented to person, place, and time. No cranial nerve deficit.   Skin: Skin is warm and dry. No rash noted. She is not diaphoretic. No erythema. No pallor.   Psychiatric: She has a normal mood and affect. Her behavior is normal. Thought content normal.   Nursing note and vitals reviewed.      Procedures         ED Course  ED Course   Comment By Time   EKG NSR 75 rate, non-specific ST-T abnormalities Gabe Delaney MD 09/19 4984                  [facility]    Final diagnoses:   Near syncope            Gabe Delaney MD  09/19/17 5565

## 2017-09-29 ENCOUNTER — APPOINTMENT (OUTPATIENT)
Dept: CT IMAGING | Facility: HOSPITAL | Age: 72
End: 2017-09-29

## 2017-09-29 ENCOUNTER — APPOINTMENT (OUTPATIENT)
Dept: GENERAL RADIOLOGY | Facility: HOSPITAL | Age: 72
End: 2017-09-29

## 2017-09-29 ENCOUNTER — HOSPITAL ENCOUNTER (INPATIENT)
Facility: HOSPITAL | Age: 72
LOS: 3 days | Discharge: HOME | End: 2017-10-02
Attending: EMERGENCY MEDICINE | Admitting: FAMILY MEDICINE

## 2017-09-29 DIAGNOSIS — R07.9 CHEST PAIN, UNSPECIFIED TYPE: Primary | ICD-10-CM

## 2017-09-29 PROBLEM — I21.19 ST ELEVATION MYOCARDIAL INFARCTION (STEMI) OF INFERIOR WALL (HCC): Status: ACTIVE | Noted: 2017-09-29

## 2017-09-29 PROBLEM — I21.19 ST ELEVATION MYOCARDIAL INFARCTION (STEMI) OF INFERIOR WALL (HCC): Status: RESOLVED | Noted: 2017-09-29 | Resolved: 2017-09-29

## 2017-09-29 LAB
ALBUMIN SERPL-MCNC: 4.1 G/DL (ref 3.4–4.8)
ALBUMIN/GLOB SERPL: 1.4 G/DL (ref 1.5–2.5)
ALP SERPL-CCNC: 93 U/L (ref 35–104)
ALT SERPL W P-5'-P-CCNC: 34 U/L (ref 10–36)
ANION GAP SERPL CALCULATED.3IONS-SCNC: 6.1 MMOL/L (ref 3.6–11.2)
APTT PPP: 24.8 SECONDS (ref 23.8–36.1)
AST SERPL-CCNC: 42 U/L (ref 10–30)
BASOPHILS # BLD AUTO: 0.04 10*3/MM3 (ref 0–0.3)
BASOPHILS NFR BLD AUTO: 0.5 % (ref 0–2)
BILIRUB SERPL-MCNC: 0.4 MG/DL (ref 0.2–1.8)
BNP SERPL-MCNC: 63 PG/ML (ref 0–100)
BUN BLD-MCNC: 14 MG/DL (ref 7–21)
BUN/CREAT SERPL: 17.1 (ref 7–25)
CALCIUM SPEC-SCNC: 9 MG/DL (ref 7.7–10)
CHLORIDE SERPL-SCNC: 106 MMOL/L (ref 99–112)
CK MB SERPL-CCNC: 4.48 NG/ML (ref 0–5)
CK MB SERPL-RTO: 1.8 % (ref 0–3)
CK SERPL-CCNC: 254 U/L (ref 24–173)
CO2 SERPL-SCNC: 24.9 MMOL/L (ref 24.3–31.9)
CREAT BLD-MCNC: 0.82 MG/DL (ref 0.43–1.29)
DEPRECATED RDW RBC AUTO: 43.2 FL (ref 37–54)
EOSINOPHIL # BLD AUTO: 0.39 10*3/MM3 (ref 0–0.7)
EOSINOPHIL NFR BLD AUTO: 4.9 % (ref 0–7)
ERYTHROCYTE [DISTWIDTH] IN BLOOD BY AUTOMATED COUNT: 14.2 % (ref 11.5–14.5)
GFR SERPL CREATININE-BSD FRML MDRD: 69 ML/MIN/1.73
GLOBULIN UR ELPH-MCNC: 3 GM/DL
GLUCOSE BLD-MCNC: 123 MG/DL (ref 70–110)
HCT VFR BLD AUTO: 37 % (ref 37–47)
HGB BLD-MCNC: 12.3 G/DL (ref 12–16)
IMM GRANULOCYTES # BLD: 0.04 10*3/MM3 (ref 0–0.03)
IMM GRANULOCYTES NFR BLD: 0.5 % (ref 0–0.5)
INR PPP: 1.01 (ref 0.9–1.1)
LYMPHOCYTES # BLD AUTO: 2.88 10*3/MM3 (ref 1–3)
LYMPHOCYTES NFR BLD AUTO: 36.5 % (ref 16–46)
MCH RBC QN AUTO: 28.1 PG (ref 27–33)
MCHC RBC AUTO-ENTMCNC: 33.2 G/DL (ref 33–37)
MCV RBC AUTO: 84.5 FL (ref 80–94)
MONOCYTES # BLD AUTO: 0.62 10*3/MM3 (ref 0.1–0.9)
MONOCYTES NFR BLD AUTO: 7.9 % (ref 0–12)
NEUTROPHILS # BLD AUTO: 3.91 10*3/MM3 (ref 1.4–6.5)
NEUTROPHILS NFR BLD AUTO: 49.7 % (ref 40–75)
OSMOLALITY SERPL CALC.SUM OF ELEC: 275.7 MOSM/KG (ref 273–305)
PLATELET # BLD AUTO: 154 10*3/MM3 (ref 130–400)
PMV BLD AUTO: 12 FL (ref 6–10)
POTASSIUM BLD-SCNC: 3.9 MMOL/L (ref 3.5–5.3)
PROT SERPL-MCNC: 7.1 G/DL (ref 6–8)
PROTHROMBIN TIME: 13.4 SECONDS (ref 11–15.4)
RBC # BLD AUTO: 4.38 10*6/MM3 (ref 4.2–5.4)
SODIUM BLD-SCNC: 137 MMOL/L (ref 135–153)
TROPONIN I SERPL-MCNC: <0.006 NG/ML
WBC NRBC COR # BLD: 7.88 10*3/MM3 (ref 4.5–12.5)

## 2017-09-29 PROCEDURE — 51702 INSERT TEMP BLADDER CATH: CPT

## 2017-09-29 PROCEDURE — 74178 CT ABD&PLV WO CNTR FLWD CNTR: CPT | Performed by: RADIOLOGY

## 2017-09-29 PROCEDURE — 83880 ASSAY OF NATRIURETIC PEPTIDE: CPT | Performed by: EMERGENCY MEDICINE

## 2017-09-29 PROCEDURE — 94799 UNLISTED PULMONARY SVC/PX: CPT

## 2017-09-29 PROCEDURE — 84484 ASSAY OF TROPONIN QUANT: CPT | Performed by: FAMILY MEDICINE

## 2017-09-29 PROCEDURE — 25010000002 HEPARIN (PORCINE) PER 1000 UNITS: Performed by: EMERGENCY MEDICINE

## 2017-09-29 PROCEDURE — 84484 ASSAY OF TROPONIN QUANT: CPT | Performed by: EMERGENCY MEDICINE

## 2017-09-29 PROCEDURE — 84484 ASSAY OF TROPONIN QUANT: CPT | Performed by: SPECIALIST

## 2017-09-29 PROCEDURE — 74178 CT ABD&PLV WO CNTR FLWD CNTR: CPT

## 2017-09-29 PROCEDURE — 71010 HC CHEST PA OR AP: CPT

## 2017-09-29 PROCEDURE — 93005 ELECTROCARDIOGRAM TRACING: CPT | Performed by: SPECIALIST

## 2017-09-29 PROCEDURE — 93005 ELECTROCARDIOGRAM TRACING: CPT | Performed by: EMERGENCY MEDICINE

## 2017-09-29 PROCEDURE — 99285 EMERGENCY DEPT VISIT HI MDM: CPT

## 2017-09-29 PROCEDURE — 0 IOPAMIDOL 61 % SOLUTION: Performed by: FAMILY MEDICINE

## 2017-09-29 PROCEDURE — 25010000002 MORPHINE PER 10 MG: Performed by: SPECIALIST

## 2017-09-29 PROCEDURE — 85610 PROTHROMBIN TIME: CPT | Performed by: EMERGENCY MEDICINE

## 2017-09-29 PROCEDURE — 80053 COMPREHEN METABOLIC PANEL: CPT | Performed by: EMERGENCY MEDICINE

## 2017-09-29 PROCEDURE — 93010 ELECTROCARDIOGRAM REPORT: CPT | Performed by: INTERNAL MEDICINE

## 2017-09-29 PROCEDURE — 85025 COMPLETE CBC W/AUTO DIFF WBC: CPT | Performed by: EMERGENCY MEDICINE

## 2017-09-29 PROCEDURE — 82553 CREATINE MB FRACTION: CPT | Performed by: EMERGENCY MEDICINE

## 2017-09-29 PROCEDURE — 85730 THROMBOPLASTIN TIME PARTIAL: CPT | Performed by: EMERGENCY MEDICINE

## 2017-09-29 PROCEDURE — 82550 ASSAY OF CK (CPK): CPT | Performed by: EMERGENCY MEDICINE

## 2017-09-29 PROCEDURE — 71010 XR CHEST 1 VW: CPT | Performed by: RADIOLOGY

## 2017-09-29 PROCEDURE — 25010000002 ENOXAPARIN PER 10 MG: Performed by: SPECIALIST

## 2017-09-29 RX ORDER — OXYCODONE HYDROCHLORIDE AND ACETAMINOPHEN 5; 325 MG/1; MG/1
TABLET ORAL
Status: DISCONTINUED
Start: 2017-09-29 | End: 2017-09-29 | Stop reason: WASHOUT

## 2017-09-29 RX ORDER — CLOPIDOGREL BISULFATE 75 MG/1
75 TABLET ORAL DAILY
Status: DISCONTINUED | OUTPATIENT
Start: 2017-09-30 | End: 2017-09-30

## 2017-09-29 RX ORDER — ROPINIROLE 0.25 MG/1
0.5 TABLET, FILM COATED ORAL NIGHTLY
Status: DISCONTINUED | OUTPATIENT
Start: 2017-09-29 | End: 2017-10-02 | Stop reason: HOSPADM

## 2017-09-29 RX ORDER — TRAMADOL HYDROCHLORIDE 50 MG/1
25 TABLET ORAL EVERY 6 HOURS PRN
Status: DISCONTINUED | OUTPATIENT
Start: 2017-09-29 | End: 2017-09-29

## 2017-09-29 RX ORDER — ATORVASTATIN CALCIUM 20 MG/1
20 TABLET, FILM COATED ORAL NIGHTLY
Status: DISCONTINUED | OUTPATIENT
Start: 2017-09-29 | End: 2017-10-02 | Stop reason: HOSPADM

## 2017-09-29 RX ORDER — MORPHINE SULFATE 2 MG/ML
1 INJECTION, SOLUTION INTRAMUSCULAR; INTRAVENOUS EVERY 4 HOURS PRN
Status: DISCONTINUED | OUTPATIENT
Start: 2017-09-29 | End: 2017-09-30

## 2017-09-29 RX ORDER — NALOXONE HCL 0.4 MG/ML
0.4 VIAL (ML) INJECTION
Status: DISCONTINUED | OUTPATIENT
Start: 2017-09-29 | End: 2017-09-30

## 2017-09-29 RX ORDER — ASPIRIN 81 MG/1
324 TABLET, CHEWABLE ORAL ONCE
Status: DISCONTINUED | OUTPATIENT
Start: 2017-09-29 | End: 2017-10-02 | Stop reason: HOSPADM

## 2017-09-29 RX ORDER — PREDNISOLONE ACETATE 10 MG/ML
1 SUSPENSION/ DROPS OPHTHALMIC EVERY 8 HOURS SCHEDULED
Status: DISCONTINUED | OUTPATIENT
Start: 2017-09-29 | End: 2017-09-29

## 2017-09-29 RX ORDER — LEVOTHYROXINE SODIUM 0.12 MG/1
125 TABLET ORAL DAILY
Status: DISCONTINUED | OUTPATIENT
Start: 2017-09-29 | End: 2017-10-02 | Stop reason: HOSPADM

## 2017-09-29 RX ORDER — FUROSEMIDE 40 MG/1
80 TABLET ORAL 2 TIMES DAILY
Status: DISCONTINUED | OUTPATIENT
Start: 2017-09-29 | End: 2017-09-30

## 2017-09-29 RX ORDER — SODIUM CHLORIDE 0.9 % (FLUSH) 0.9 %
1-10 SYRINGE (ML) INJECTION AS NEEDED
Status: DISCONTINUED | OUTPATIENT
Start: 2017-09-29 | End: 2017-10-02 | Stop reason: HOSPADM

## 2017-09-29 RX ORDER — CLOPIDOGREL BISULFATE 75 MG/1
TABLET ORAL
Status: COMPLETED
Start: 2017-09-29 | End: 2017-09-29

## 2017-09-29 RX ORDER — PANTOPRAZOLE SODIUM 40 MG/1
40 TABLET, DELAYED RELEASE ORAL
Status: DISCONTINUED | OUTPATIENT
Start: 2017-09-29 | End: 2017-10-02 | Stop reason: HOSPADM

## 2017-09-29 RX ORDER — CETIRIZINE HYDROCHLORIDE 10 MG/1
10 TABLET ORAL DAILY
Status: CANCELLED | OUTPATIENT
Start: 2017-09-29

## 2017-09-29 RX ORDER — MIRTAZAPINE 15 MG/1
7.5 TABLET, FILM COATED ORAL NIGHTLY
Status: DISCONTINUED | OUTPATIENT
Start: 2017-09-29 | End: 2017-10-02 | Stop reason: HOSPADM

## 2017-09-29 RX ORDER — FAMOTIDINE 10 MG/ML
20 INJECTION, SOLUTION INTRAVENOUS ONCE
Status: DISCONTINUED | OUTPATIENT
Start: 2017-09-29 | End: 2017-09-30

## 2017-09-29 RX ORDER — ASPIRIN 81 MG/1
81 TABLET ORAL DAILY
Status: DISCONTINUED | OUTPATIENT
Start: 2017-09-30 | End: 2017-10-02 | Stop reason: HOSPADM

## 2017-09-29 RX ORDER — PROPRANOLOL HYDROCHLORIDE 10 MG/1
40 TABLET ORAL 3 TIMES DAILY
Status: DISCONTINUED | OUTPATIENT
Start: 2017-09-29 | End: 2017-10-02 | Stop reason: HOSPADM

## 2017-09-29 RX ORDER — HEPARIN SODIUM 5000 [USP'U]/ML
60 INJECTION, SOLUTION INTRAVENOUS; SUBCUTANEOUS AS NEEDED
Status: DISCONTINUED | OUTPATIENT
Start: 2017-09-29 | End: 2017-09-29

## 2017-09-29 RX ORDER — TRIAMCINOLONE ACETONIDE 1 MG/G
CREAM TOPICAL EVERY 12 HOURS SCHEDULED
Status: DISCONTINUED | OUTPATIENT
Start: 2017-09-29 | End: 2017-09-29

## 2017-09-29 RX ORDER — TRIAMCINOLONE ACETONIDE 1 MG/G
1 CREAM TOPICAL AS NEEDED
Status: DISCONTINUED | OUTPATIENT
Start: 2017-09-29 | End: 2017-10-02 | Stop reason: HOSPADM

## 2017-09-29 RX ORDER — POTASSIUM CHLORIDE 750 MG/1
10 TABLET, FILM COATED, EXTENDED RELEASE ORAL DAILY
Status: DISCONTINUED | OUTPATIENT
Start: 2017-09-29 | End: 2017-09-29

## 2017-09-29 RX ORDER — ACETAMINOPHEN 325 MG/1
650 TABLET ORAL EVERY 4 HOURS PRN
Status: DISCONTINUED | OUTPATIENT
Start: 2017-09-29 | End: 2017-10-02 | Stop reason: HOSPADM

## 2017-09-29 RX ORDER — OXYCODONE AND ACETAMINOPHEN 10; 325 MG/1; MG/1
1 TABLET ORAL EVERY 6 HOURS
Status: DISCONTINUED | OUTPATIENT
Start: 2017-09-29 | End: 2017-10-02 | Stop reason: HOSPADM

## 2017-09-29 RX ORDER — AMITRIPTYLINE HYDROCHLORIDE 50 MG/1
100 TABLET, FILM COATED ORAL NIGHTLY
Status: DISCONTINUED | OUTPATIENT
Start: 2017-09-29 | End: 2017-10-02 | Stop reason: HOSPADM

## 2017-09-29 RX ORDER — CETIRIZINE HYDROCHLORIDE 10 MG/1
10 TABLET ORAL DAILY
Status: DISCONTINUED | OUTPATIENT
Start: 2017-09-29 | End: 2017-09-30

## 2017-09-29 RX ORDER — CYCLOBENZAPRINE HCL 10 MG
5 TABLET ORAL 3 TIMES DAILY PRN
Status: DISCONTINUED | OUTPATIENT
Start: 2017-09-29 | End: 2017-09-29

## 2017-09-29 RX ORDER — LORATADINE 10 MG/1
10 TABLET ORAL DAILY
Status: ON HOLD | COMMUNITY
End: 2018-05-16

## 2017-09-29 RX ORDER — SODIUM CHLORIDE 0.9 % (FLUSH) 0.9 %
10 SYRINGE (ML) INJECTION AS NEEDED
Status: DISCONTINUED | OUTPATIENT
Start: 2017-09-29 | End: 2017-10-02 | Stop reason: HOSPADM

## 2017-09-29 RX ORDER — ONDANSETRON 4 MG/1
4 TABLET, FILM COATED ORAL EVERY 4 HOURS PRN
Status: DISCONTINUED | OUTPATIENT
Start: 2017-09-29 | End: 2017-10-02 | Stop reason: HOSPADM

## 2017-09-29 RX ORDER — CLOPIDOGREL BISULFATE 75 MG/1
600 TABLET ORAL ONCE
Status: COMPLETED | OUTPATIENT
Start: 2017-09-29 | End: 2017-09-29

## 2017-09-29 RX ORDER — HYDROCODONE BITARTRATE AND ACETAMINOPHEN 5; 325 MG/1; MG/1
1 TABLET ORAL EVERY 4 HOURS PRN
Status: DISCONTINUED | OUTPATIENT
Start: 2017-09-29 | End: 2017-09-30

## 2017-09-29 RX ORDER — ASPIRIN 81 MG/1
81 TABLET ORAL DAILY
Status: DISCONTINUED | OUTPATIENT
Start: 2017-09-29 | End: 2017-09-29 | Stop reason: SDUPTHER

## 2017-09-29 RX ORDER — QUETIAPINE FUMARATE 300 MG/1
300 TABLET, FILM COATED ORAL NIGHTLY
Status: DISCONTINUED | OUTPATIENT
Start: 2017-09-29 | End: 2017-10-02 | Stop reason: HOSPADM

## 2017-09-29 RX ORDER — OXYCODONE AND ACETAMINOPHEN 10; 325 MG/1; MG/1
TABLET ORAL
Status: DISPENSED
Start: 2017-09-29 | End: 2017-09-29

## 2017-09-29 RX ORDER — SUCRALFATE 1 G/1
1 TABLET ORAL
Status: DISCONTINUED | OUTPATIENT
Start: 2017-09-29 | End: 2017-09-29

## 2017-09-29 RX ORDER — FAMOTIDINE 20 MG/1
20 TABLET, FILM COATED ORAL 2 TIMES DAILY
Status: DISCONTINUED | OUTPATIENT
Start: 2017-09-29 | End: 2017-09-29 | Stop reason: SDUPTHER

## 2017-09-29 RX ORDER — ALPRAZOLAM 0.5 MG/1
0.5 TABLET ORAL 2 TIMES DAILY PRN
Status: DISCONTINUED | OUTPATIENT
Start: 2017-09-29 | End: 2017-10-02 | Stop reason: HOSPADM

## 2017-09-29 RX ORDER — HEPARIN SODIUM 5000 [USP'U]/ML
60 INJECTION, SOLUTION INTRAVENOUS; SUBCUTANEOUS ONCE
Status: COMPLETED | OUTPATIENT
Start: 2017-09-29 | End: 2017-09-29

## 2017-09-29 RX ORDER — TRIAMCINOLONE ACETONIDE 1 MG/G
1 CREAM TOPICAL AS NEEDED
Status: ON HOLD | COMMUNITY
End: 2018-05-16

## 2017-09-29 RX ORDER — HYDROXYZINE HYDROCHLORIDE 50 MG/ML
25 INJECTION, SOLUTION INTRAMUSCULAR EVERY 4 HOURS PRN
Status: DISCONTINUED | OUTPATIENT
Start: 2017-09-29 | End: 2017-09-29

## 2017-09-29 RX ORDER — HEPARIN SODIUM 5000 [USP'U]/ML
30 INJECTION, SOLUTION INTRAVENOUS; SUBCUTANEOUS AS NEEDED
Status: DISCONTINUED | OUTPATIENT
Start: 2017-09-29 | End: 2017-09-29

## 2017-09-29 RX ADMIN — ACETAMINOPHEN 650 MG: 325 TABLET ORAL at 12:51

## 2017-09-29 RX ADMIN — CLOPIDOGREL BISULFATE 600 MG: 75 TABLET ORAL at 04:35

## 2017-09-29 RX ADMIN — PANTOPRAZOLE SODIUM 40 MG: 40 TABLET, DELAYED RELEASE ORAL at 12:40

## 2017-09-29 RX ADMIN — ATORVASTATIN CALCIUM 20 MG: 20 TABLET, FILM COATED ORAL at 21:18

## 2017-09-29 RX ADMIN — PROPRANOLOL HYDROCHLORIDE 40 MG: 10 TABLET ORAL at 17:12

## 2017-09-29 RX ADMIN — MIRTAZAPINE 7.5 MG: 15 TABLET, FILM COATED ORAL at 21:12

## 2017-09-29 RX ADMIN — OXYCODONE AND ACETAMINOPHEN 1 TABLET: 10; 325 TABLET ORAL at 06:03

## 2017-09-29 RX ADMIN — OXYCODONE AND ACETAMINOPHEN 1 TABLET: 10; 325 TABLET ORAL at 17:12

## 2017-09-29 RX ADMIN — CLOPIDOGREL 600 MG: 75 TABLET, FILM COATED ORAL at 04:35

## 2017-09-29 RX ADMIN — IOPAMIDOL 100 ML: 612 INJECTION, SOLUTION INTRAVENOUS at 17:45

## 2017-09-29 RX ADMIN — HEPARIN SODIUM 5000 UNITS: 5000 INJECTION, SOLUTION INTRAVENOUS; SUBCUTANEOUS at 04:35

## 2017-09-29 RX ADMIN — MORPHINE SULFATE 2 MG: 2 INJECTION, SOLUTION INTRAMUSCULAR; INTRAVENOUS at 06:04

## 2017-09-29 RX ADMIN — ROPINIROLE 0.5 MG: 0.25 TABLET ORAL at 21:12

## 2017-09-29 RX ADMIN — ENOXAPARIN SODIUM 40 MG: 40 INJECTION SUBCUTANEOUS at 17:51

## 2017-09-29 RX ADMIN — HEPARIN SODIUM 1000 UNITS/HR: 10000 INJECTION, SOLUTION INTRAVENOUS at 04:54

## 2017-09-29 RX ADMIN — QUETIAPINE FUMARATE 300 MG: 300 TABLET, FILM COATED ORAL at 21:18

## 2017-09-29 RX ADMIN — LEVOTHYROXINE SODIUM 125 MCG: 0.12 TABLET ORAL at 12:42

## 2017-09-29 RX ADMIN — AMITRIPTYLINE HYDROCHLORIDE 100 MG: 50 TABLET, FILM COATED ORAL at 21:12

## 2017-09-29 RX ADMIN — CETIRIZINE HYDROCHLORIDE 10 MG: 10 TABLET ORAL at 12:40

## 2017-09-30 ENCOUNTER — APPOINTMENT (OUTPATIENT)
Dept: ULTRASOUND IMAGING | Facility: HOSPITAL | Age: 72
End: 2017-09-30
Attending: FAMILY MEDICINE

## 2017-09-30 LAB
BILIRUB UR QL STRIP: NEGATIVE
CLARITY UR: CLEAR
COLOR UR: YELLOW
GLUCOSE UR STRIP-MCNC: NEGATIVE MG/DL
HGB UR QL STRIP.AUTO: NEGATIVE
KETONES UR QL STRIP: NEGATIVE
LEUKOCYTE ESTERASE UR QL STRIP.AUTO: NEGATIVE
NITRITE UR QL STRIP: NEGATIVE
PH UR STRIP.AUTO: 5.5 [PH] (ref 5–8)
PROT UR QL STRIP: NEGATIVE
SP GR UR STRIP: 1.01 (ref 1–1.03)
UROBILINOGEN UR QL STRIP: NORMAL

## 2017-09-30 PROCEDURE — 81003 URINALYSIS AUTO W/O SCOPE: CPT | Performed by: FAMILY MEDICINE

## 2017-09-30 PROCEDURE — 93970 EXTREMITY STUDY: CPT | Performed by: RADIOLOGY

## 2017-09-30 PROCEDURE — 25010000002 ENOXAPARIN PER 10 MG: Performed by: SPECIALIST

## 2017-09-30 PROCEDURE — 94799 UNLISTED PULMONARY SVC/PX: CPT

## 2017-09-30 PROCEDURE — 93970 EXTREMITY STUDY: CPT

## 2017-09-30 RX ORDER — FUROSEMIDE 20 MG/1
20 TABLET ORAL DAILY
Status: DISCONTINUED | OUTPATIENT
Start: 2017-10-01 | End: 2017-10-02 | Stop reason: HOSPADM

## 2017-09-30 RX ADMIN — PROPRANOLOL HYDROCHLORIDE 40 MG: 10 TABLET ORAL at 20:05

## 2017-09-30 RX ADMIN — ATORVASTATIN CALCIUM 20 MG: 20 TABLET, FILM COATED ORAL at 20:05

## 2017-09-30 RX ADMIN — ASPIRIN 81 MG: 81 TABLET ORAL at 08:44

## 2017-09-30 RX ADMIN — ALPRAZOLAM 0.5 MG: 0.5 TABLET ORAL at 17:36

## 2017-09-30 RX ADMIN — PANTOPRAZOLE SODIUM 40 MG: 40 TABLET, DELAYED RELEASE ORAL at 05:27

## 2017-09-30 RX ADMIN — OXYCODONE AND ACETAMINOPHEN 1 TABLET: 10; 325 TABLET ORAL at 17:36

## 2017-09-30 RX ADMIN — OXYCODONE AND ACETAMINOPHEN 1 TABLET: 10; 325 TABLET ORAL at 11:42

## 2017-09-30 RX ADMIN — LEVOTHYROXINE SODIUM 125 MCG: 0.12 TABLET ORAL at 08:43

## 2017-09-30 RX ADMIN — ROPINIROLE 0.5 MG: 0.25 TABLET ORAL at 20:05

## 2017-09-30 RX ADMIN — METOPROLOL TARTRATE 25 MG: 25 TABLET, FILM COATED ORAL at 08:45

## 2017-09-30 RX ADMIN — ENOXAPARIN SODIUM 40 MG: 40 INJECTION SUBCUTANEOUS at 08:43

## 2017-09-30 RX ADMIN — CETIRIZINE HYDROCHLORIDE 10 MG: 10 TABLET ORAL at 08:44

## 2017-09-30 RX ADMIN — QUETIAPINE FUMARATE 300 MG: 300 TABLET, FILM COATED ORAL at 20:05

## 2017-09-30 RX ADMIN — AMITRIPTYLINE HYDROCHLORIDE 100 MG: 50 TABLET, FILM COATED ORAL at 20:05

## 2017-09-30 RX ADMIN — OXYCODONE AND ACETAMINOPHEN 1 TABLET: 10; 325 TABLET ORAL at 23:45

## 2017-09-30 RX ADMIN — CLOPIDOGREL 75 MG: 75 TABLET, FILM COATED ORAL at 08:45

## 2017-09-30 RX ADMIN — ACETAMINOPHEN 650 MG: 325 TABLET ORAL at 14:46

## 2017-09-30 RX ADMIN — OXYCODONE AND ACETAMINOPHEN 1 TABLET: 10; 325 TABLET ORAL at 05:26

## 2017-09-30 RX ADMIN — MIRTAZAPINE 7.5 MG: 15 TABLET, FILM COATED ORAL at 20:05

## 2017-09-30 RX ADMIN — OXYCODONE AND ACETAMINOPHEN 1 TABLET: 10; 325 TABLET ORAL at 00:35

## 2017-10-01 LAB
ALBUMIN SERPL-MCNC: 3.8 G/DL (ref 3.4–4.8)
ALBUMIN/GLOB SERPL: 1.4 G/DL (ref 1.5–2.5)
ALP SERPL-CCNC: 91 U/L (ref 35–104)
ALT SERPL W P-5'-P-CCNC: 43 U/L (ref 10–36)
AMYLASE SERPL-CCNC: 43 U/L (ref 28–100)
ANION GAP SERPL CALCULATED.3IONS-SCNC: 8.6 MMOL/L (ref 3.6–11.2)
AST SERPL-CCNC: 41 U/L (ref 10–30)
BASOPHILS # BLD AUTO: 0.02 10*3/MM3 (ref 0–0.3)
BASOPHILS NFR BLD AUTO: 0.3 % (ref 0–2)
BILIRUB SERPL-MCNC: 0.3 MG/DL (ref 0.2–1.8)
BUN BLD-MCNC: 9 MG/DL (ref 7–21)
BUN/CREAT SERPL: 12.5 (ref 7–25)
CALCIUM SPEC-SCNC: 9.1 MG/DL (ref 7.7–10)
CHLORIDE SERPL-SCNC: 104 MMOL/L (ref 99–112)
CO2 SERPL-SCNC: 25.4 MMOL/L (ref 24.3–31.9)
CREAT BLD-MCNC: 0.72 MG/DL (ref 0.43–1.29)
CRP SERPL-MCNC: 2.07 MG/DL (ref 0–0.99)
DEPRECATED RDW RBC AUTO: 44.2 FL (ref 37–54)
EOSINOPHIL # BLD AUTO: 0.43 10*3/MM3 (ref 0–0.7)
EOSINOPHIL NFR BLD AUTO: 6.5 % (ref 0–7)
ERYTHROCYTE [DISTWIDTH] IN BLOOD BY AUTOMATED COUNT: 14.3 % (ref 11.5–14.5)
ERYTHROCYTE [SEDIMENTATION RATE] IN BLOOD: 14 MM/HR (ref 0–30)
GFR SERPL CREATININE-BSD FRML MDRD: 80 ML/MIN/1.73
GLOBULIN UR ELPH-MCNC: 2.7 GM/DL
GLUCOSE BLD-MCNC: 155 MG/DL (ref 70–110)
HCT VFR BLD AUTO: 36.9 % (ref 37–47)
HGB BLD-MCNC: 11.8 G/DL (ref 12–16)
IMM GRANULOCYTES # BLD: 0.03 10*3/MM3 (ref 0–0.03)
IMM GRANULOCYTES NFR BLD: 0.5 % (ref 0–0.5)
LIPASE SERPL-CCNC: 21 U/L (ref 13–60)
LYMPHOCYTES # BLD AUTO: 2.8 10*3/MM3 (ref 1–3)
LYMPHOCYTES NFR BLD AUTO: 42.1 % (ref 16–46)
MCH RBC QN AUTO: 27.5 PG (ref 27–33)
MCHC RBC AUTO-ENTMCNC: 32 G/DL (ref 33–37)
MCV RBC AUTO: 86 FL (ref 80–94)
MONOCYTES # BLD AUTO: 0.62 10*3/MM3 (ref 0.1–0.9)
MONOCYTES NFR BLD AUTO: 9.3 % (ref 0–12)
NEUTROPHILS # BLD AUTO: 2.75 10*3/MM3 (ref 1.4–6.5)
NEUTROPHILS NFR BLD AUTO: 41.3 % (ref 40–75)
OSMOLALITY SERPL CALC.SUM OF ELEC: 277.5 MOSM/KG (ref 273–305)
PLATELET # BLD AUTO: 118 10*3/MM3 (ref 130–400)
PMV BLD AUTO: 12.8 FL (ref 6–10)
POTASSIUM BLD-SCNC: 3.8 MMOL/L (ref 3.5–5.3)
PROT SERPL-MCNC: 6.5 G/DL (ref 6–8)
RBC # BLD AUTO: 4.29 10*6/MM3 (ref 4.2–5.4)
SODIUM BLD-SCNC: 138 MMOL/L (ref 135–153)
WBC NRBC COR # BLD: 6.65 10*3/MM3 (ref 4.5–12.5)

## 2017-10-01 PROCEDURE — 85025 COMPLETE CBC W/AUTO DIFF WBC: CPT | Performed by: FAMILY MEDICINE

## 2017-10-01 PROCEDURE — 83690 ASSAY OF LIPASE: CPT | Performed by: FAMILY MEDICINE

## 2017-10-01 PROCEDURE — 94799 UNLISTED PULMONARY SVC/PX: CPT

## 2017-10-01 PROCEDURE — 85652 RBC SED RATE AUTOMATED: CPT | Performed by: FAMILY MEDICINE

## 2017-10-01 PROCEDURE — 86140 C-REACTIVE PROTEIN: CPT | Performed by: FAMILY MEDICINE

## 2017-10-01 PROCEDURE — 80053 COMPREHEN METABOLIC PANEL: CPT | Performed by: FAMILY MEDICINE

## 2017-10-01 PROCEDURE — 82150 ASSAY OF AMYLASE: CPT | Performed by: FAMILY MEDICINE

## 2017-10-01 RX ADMIN — QUETIAPINE FUMARATE 300 MG: 300 TABLET, FILM COATED ORAL at 20:09

## 2017-10-01 RX ADMIN — AMITRIPTYLINE HYDROCHLORIDE 100 MG: 50 TABLET, FILM COATED ORAL at 20:09

## 2017-10-01 RX ADMIN — OXYCODONE AND ACETAMINOPHEN 1 TABLET: 10; 325 TABLET ORAL at 18:02

## 2017-10-01 RX ADMIN — PROPRANOLOL HYDROCHLORIDE 40 MG: 10 TABLET ORAL at 20:09

## 2017-10-01 RX ADMIN — ALPRAZOLAM 0.5 MG: 0.5 TABLET ORAL at 05:46

## 2017-10-01 RX ADMIN — OXYCODONE AND ACETAMINOPHEN 1 TABLET: 10; 325 TABLET ORAL at 23:53

## 2017-10-01 RX ADMIN — PANTOPRAZOLE SODIUM 40 MG: 40 TABLET, DELAYED RELEASE ORAL at 05:46

## 2017-10-01 RX ADMIN — ASPIRIN 81 MG: 81 TABLET ORAL at 08:33

## 2017-10-01 RX ADMIN — FUROSEMIDE 20 MG: 20 TABLET ORAL at 08:33

## 2017-10-01 RX ADMIN — OXYCODONE AND ACETAMINOPHEN 1 TABLET: 10; 325 TABLET ORAL at 05:46

## 2017-10-01 RX ADMIN — PROPRANOLOL HYDROCHLORIDE 40 MG: 10 TABLET ORAL at 17:00

## 2017-10-01 RX ADMIN — ATORVASTATIN CALCIUM 20 MG: 20 TABLET, FILM COATED ORAL at 20:09

## 2017-10-01 RX ADMIN — MIRTAZAPINE 7.5 MG: 15 TABLET, FILM COATED ORAL at 20:09

## 2017-10-01 RX ADMIN — PROPRANOLOL HYDROCHLORIDE 40 MG: 10 TABLET ORAL at 08:33

## 2017-10-01 RX ADMIN — ROPINIROLE 0.5 MG: 0.25 TABLET ORAL at 20:09

## 2017-10-01 RX ADMIN — LEVOTHYROXINE SODIUM 125 MCG: 0.12 TABLET ORAL at 08:33

## 2017-10-01 RX ADMIN — OXYCODONE AND ACETAMINOPHEN 1 TABLET: 10; 325 TABLET ORAL at 12:40

## 2017-10-01 NOTE — PLAN OF CARE
Problem: Patient Care Overview (Adult)  Goal: Plan of Care Review  Outcome: Ongoing (interventions implemented as appropriate)  Goal: Adult Individualization and Mutuality  Outcome: Ongoing (interventions implemented as appropriate)  Goal: Discharge Needs Assessment  Outcome: Ongoing (interventions implemented as appropriate)    Problem: Fall Risk (Adult)  Goal: Identify Related Risk Factors and Signs and Symptoms  Outcome: Ongoing (interventions implemented as appropriate)  Goal: Absence of Falls  Outcome: Ongoing (interventions implemented as appropriate)    Problem: Pressure Ulcer Risk (Rk Scale) (Adult,Obstetrics,Pediatric)  Goal: Identify Related Risk Factors and Signs and Symptoms  Outcome: Ongoing (interventions implemented as appropriate)  Goal: Skin Integrity  Outcome: Ongoing (interventions implemented as appropriate)

## 2017-10-01 NOTE — PROGRESS NOTES
"     LOS: 2 days     Chief Complaint:  Epigastric pain    Subjective     Interval History:     She denies any chest pain.  She still has some intermittent epigastric discomfort.  No fevers or chills.  She is eating okay..      Objective     Vital Signs  /60 (BP Location: Left arm, Patient Position: Lying)  Pulse 73  Temp 98 °F (36.7 °C) (Oral)   Resp 18  Ht 64\" (162.6 cm)  Wt 189 lb 9.6 oz (86 kg)  SpO2 99%  BMI 32.54 kg/m2  Intake & Output (last day)       09/30 0701 - 10/01 0700 10/01 0701 - 10/02 0700    P.O. 1320     Total Intake(mL/kg) 1320 (15.3)     Urine (mL/kg/hr) 1600 (0.8)     Total Output 1600      Net -280                    Physical Exam:     General Appearance:    Alert, cooperative, in no acute distress.  Pleasant and interactive.     Head:    Normocephalic, without obvious abnormality, atraumatic   Eyes:            Lids and lashes normal, conjunctivae and sclerae normal, no   icterus, no pallor, corneas clear, PERRLA   Ears:    Ears appear intact with no abnormalities noted       Neck:   No adenopathy, supple, trachea midline, no thyromegaly, no   carotid bruit, no JVD   Lungs:     Clear to auscultation,respirations regular, even and                  unlabored    Heart:    Regular rhythm and normal rate, normal S1 and S2, no            murmur, no gallop, no rub, no click   Chest Wall:    No abnormalities observed   Abdomen:     Normal bowel sounds, no masses, no organomegaly, soft        non-tender, non-distended, no guarding, no rebound                tenderness   Extremities:   Moves all extremities well, no edema, no cyanosis, no             redness   Pulses:   Pulses palpable and equal bilaterally           Neurologic:   Cranial nerves 2 - 12 grossly intact, sensation intact, DTR       present and equal bilaterally        Results Review:    Lab Results   Component Value Date    WBC 6.65 10/01/2017    HGB 11.8 (L) 10/01/2017    HCT 36.9 (L) 10/01/2017    MCV 86.0 10/01/2017    PLT " 118 (L) 10/01/2017       Lab Results   Component Value Date    GLUCOSE 155 (H) 10/01/2017    BUN 9 10/01/2017    CREATININE 0.72 10/01/2017    EGFRIFNONA 80 10/01/2017    BCR 12.5 10/01/2017     10/01/2017    K 3.8 10/01/2017     10/01/2017    CO2 25.4 10/01/2017    CALCIUM 9.1 10/01/2017    ALBUMIN 3.80 10/01/2017    LABIL2 1.4 (L) 10/01/2017    AST 41 (H) 10/01/2017    ALT 43 (H) 10/01/2017     Lab Results   Component Value Date    INR 1.01 09/29/2017    INR 0.90 06/10/2017    INR 0.89 02/24/2017       No results found for: POCGLU       Medication Review:     Current Facility-Administered Medications:   •  acetaminophen (TYLENOL) tablet 650 mg, 650 mg, Oral, Q4H PRN, Rocky Zacarias MD, 650 mg at 09/30/17 1446  •  ALPRAZolam (XANAX) tablet 0.5 mg, 0.5 mg, Oral, BID PRN, Rocky Zacarias MD, 0.5 mg at 10/01/17 0546  •  amitriptyline (ELAVIL) tablet 100 mg, 100 mg, Oral, Nightly, Rocky Zacarias MD, 100 mg at 09/30/17 2005  •  aspirin chewable tablet 324 mg, 324 mg, Oral, Once **AND** aspirin EC tablet 81 mg, 81 mg, Oral, Daily, Rocky Zacarias MD, 81 mg at 10/01/17 0833  •  atorvastatin (LIPITOR) tablet 20 mg, 20 mg, Oral, Nightly, Rocky Zacarias MD, 20 mg at 09/30/17 2005  •  furosemide (LASIX) tablet 20 mg, 20 mg, Oral, Daily, Rocky Zacarias MD, 20 mg at 10/01/17 0833  •  levothyroxine (SYNTHROID, LEVOTHROID) tablet 125 mcg, 125 mcg, Oral, Daily, Rocky Zacarias MD, 125 mcg at 10/01/17 0833  •  mirtazapine (REMERON) tablet 7.5 mg, 7.5 mg, Oral, Nightly, Rocky Zacarias MD, 7.5 mg at 09/30/17 2005  •  ondansetron (ZOFRAN) tablet 4 mg, 4 mg, Oral, Q4H PRN, Rocky Zacarias MD  •  oxyCODONE-acetaminophen (PERCOCET)  MG per tablet 1 tablet, 1 tablet, Oral, Q6H, Rocky Zacarias MD, 1 tablet at 10/01/17 0546  •  pantoprazole (PROTONIX) EC tablet 40 mg, 40 mg, Oral, Q AM, Rocky Zacarias MD, 40 mg at 10/01/17 0546  •  propranolol (INDERAL) tablet 40 mg, 40 mg, Oral, TID, Rocky Zacarias MD,  40 mg at 10/01/17 0833  •  QUEtiapine (SEROquel) tablet 300 mg, 300 mg, Oral, Nightly, Rocky Zacarias MD, 300 mg at 09/30/17 2005  •  rOPINIRole (REQUIP) tablet 0.5 mg, 0.5 mg, Oral, Nightly, Rocky Zacarias MD, 0.5 mg at 09/30/17 2005  •  sodium chloride 0.9 % flush 1-10 mL, 1-10 mL, Intravenous, PRN, Rocky Zacarias MD  •  Insert peripheral IV, , , Once **AND** sodium chloride 0.9 % flush 10 mL, 10 mL, Intravenous, PRN, Rohan Ruiz MD  •  triamcinolone (KENALOG) 0.1 % cream 1 application, 1 application, Topical, PRN, Rocky Zacarias MD      Assessment/Plan     Chest pain and epigastric pain        she seems to be doing okay.  I discussed going home but she insists to speak with her primary physician tomorrow.  Continue current psych meds. Cardiology input appreciated.        Samuel Duane Kreis, MD  10/01/17  10:16 AM

## 2017-10-01 NOTE — PLAN OF CARE
Problem: Patient Care Overview (Adult)  Goal: Plan of Care Review  Outcome: Ongoing (interventions implemented as appropriate)    Problem: Fall Risk (Adult)  Goal: Identify Related Risk Factors and Signs and Symptoms  Outcome: Ongoing (interventions implemented as appropriate)  Goal: Absence of Falls  Outcome: Ongoing (interventions implemented as appropriate)    Problem: Pressure Ulcer Risk (Rk Scale) (Adult,Obstetrics,Pediatric)  Goal: Identify Related Risk Factors and Signs and Symptoms  Outcome: Ongoing (interventions implemented as appropriate)  Goal: Skin Integrity  Outcome: Ongoing (interventions implemented as appropriate)

## 2017-10-02 VITALS
TEMPERATURE: 98.5 F | OXYGEN SATURATION: 98 % | RESPIRATION RATE: 20 BRPM | DIASTOLIC BLOOD PRESSURE: 68 MMHG | WEIGHT: 189.6 LBS | BODY MASS INDEX: 32.37 KG/M2 | HEART RATE: 86 BPM | HEIGHT: 64 IN | SYSTOLIC BLOOD PRESSURE: 122 MMHG

## 2017-10-02 PROCEDURE — 94799 UNLISTED PULMONARY SVC/PX: CPT

## 2017-10-02 RX ORDER — ATORVASTATIN CALCIUM 20 MG/1
20 TABLET, FILM COATED ORAL NIGHTLY
Qty: 30 TABLET | Refills: 1 | Status: ON HOLD | OUTPATIENT
Start: 2017-10-02 | End: 2017-12-05

## 2017-10-02 RX ADMIN — PANTOPRAZOLE SODIUM 40 MG: 40 TABLET, DELAYED RELEASE ORAL at 05:21

## 2017-10-02 RX ADMIN — OXYCODONE AND ACETAMINOPHEN 1 TABLET: 10; 325 TABLET ORAL at 05:21

## 2017-10-02 RX ADMIN — PROPRANOLOL HYDROCHLORIDE 40 MG: 10 TABLET ORAL at 08:35

## 2017-10-02 RX ADMIN — LEVOTHYROXINE SODIUM 125 MCG: 0.12 TABLET ORAL at 08:35

## 2017-10-02 RX ADMIN — ASPIRIN 81 MG: 81 TABLET ORAL at 08:36

## 2017-10-02 RX ADMIN — FUROSEMIDE 20 MG: 20 TABLET ORAL at 08:36

## 2017-10-02 RX ADMIN — ALPRAZOLAM 0.5 MG: 0.5 TABLET ORAL at 08:36

## 2017-10-02 NOTE — DISCHARGE SUMMARY
Date of Discharge:  10/2/2017    Discharge Diagnosis: Noncardiac chest pain                                          Gastroesophageal reflux disease with esophagitis                                          Hypertension                                           Depression                                           Hypothyroidism    Presenting Problem/History of Present Illness  Chest pain was ruled out for STEMI     Chest pain  Hospital Course  Patient is a 72 y.o. female presented with chest pressure and pain she was originally evaluated in the emergency room and felt to have a STMEMI patient was effectively ruled out on EKG she had more J-point elevation in her cardiac enzymes were negative throughout her hospital course she was monitored on telemetry where she maintained a sinus rhythm without any changes the patient's laboratory evaluations are essentially negative she's complained of on and off epigastric pain with esophagitis cardiology evaluated and effectively ruled out patient is been stable since that time and will be discharges morning afebrile vital signs stable in good condition and ruled out for any cardiac event she will be followed back with Dr. Madi Tidwell her primary caregiver this week.      Procedures Performed  Procedure(s):  Procedure Aborted       Consults:   Consults     Date and Time Order Name Status Description    9/30/2017 1804 Inpatient Consult to Psychiatrist      9/29/2017 0656 Inpatient Consult to Cardiology            Pertinent Test Results   Lab Results (last 72 hours)     Procedure Component Value Units Date/Time    Troponin [[id number]]  (Normal) Collected:  09/29/17 1052    Specimen:  Blood Updated:  09/29/17 1135     Troponin I <0.006 ng/mL     Narrative:       Ultra Troponin I Reference Range:         <=0.039 ng/mL: Negative    0.04-0.779 ng/mL: Indeterminate Range. Suspicious of MI.  Clinical correlation required.       >=0.78  ng/mL: Consistent with myocardial injury.   Clinical correlation required.    Troponin [[id number]]  (Normal) Collected:  09/29/17 1731    Specimen:  Blood Updated:  09/29/17 1830     Troponin I <0.006 ng/mL     Narrative:       Ultra Troponin I Reference Range:         <=0.039 ng/mL: Negative    0.04-0.779 ng/mL: Indeterminate Range. Suspicious of MI.  Clinical correlation required.       >=0.78  ng/mL: Consistent with myocardial injury.  Clinical correlation required.    Urinalysis With / Culture If Indicated - Urine, Clean Catch [[id number]]  (Normal) Collected:  09/30/17 1613    Specimen:  Urine from Urine, Clean Catch Updated:  09/30/17 1638     Color, UA Yellow     Appearance, UA Clear     pH, UA 5.5     Specific Gravity, UA 1.008     Glucose, UA Negative     Ketones, UA Negative     Bilirubin, UA Negative     Blood, UA Negative     Protein, UA Negative     Leuk Esterase, UA Negative     Nitrite, UA Negative     Urobilinogen, UA 0.2 E.U./dL    Narrative:       Urine microscopic not indicated.    CBC Auto Differential [[id number]]  (Abnormal) Collected:  10/01/17 0047    Specimen:  Blood Updated:  10/01/17 0108     WBC 6.65 10*3/mm3      RBC 4.29 10*6/mm3      Hemoglobin 11.8 (L) g/dL      Hematocrit 36.9 (L) %      MCV 86.0 fL      MCH 27.5 pg      MCHC 32.0 (L) g/dL      RDW 14.3 %      RDW-SD 44.2 fl      MPV 12.8 (H) fL      Platelets 118 (L) 10*3/mm3      Neutrophil % 41.3 %      Lymphocyte % 42.1 %      Monocyte % 9.3 %      Eosinophil % 6.5 %      Basophil % 0.3 %      Immature Grans % 0.5 %      Neutrophils, Absolute 2.75 10*3/mm3      Lymphocytes, Absolute 2.80 10*3/mm3      Monocytes, Absolute 0.62 10*3/mm3      Eosinophils, Absolute 0.43 10*3/mm3      Basophils, Absolute 0.02 10*3/mm3      Immature Grans, Absolute 0.03 10*3/mm3     CBC & Differential [[id number]] Collected:  10/01/17 0047    Specimen:  Blood Updated:  10/01/17 0108    Narrative:       The following orders were created for panel order CBC & Differential.  Procedure                                Abnormality         Status                     ---------                               -----------         ------                     CBC Auto Differential[[id number]]        Abnormal            Final result                 Please view results for these tests on the individual orders.    Sedimentation Rate [[id number]]  (Normal) Collected:  10/01/17 0047    Specimen:  Blood Updated:  10/01/17 0117     Sed Rate 14 mm/hr     C-reactive Protein [[id number]]  (Abnormal) Collected:  10/01/17 0047    Specimen:  Blood Updated:  10/01/17 0134     C-Reactive Protein 2.07 (H) mg/dL       1+ Hemolysis        Comprehensive Metabolic Panel [[id number]]  (Abnormal) Collected:  10/01/17 0047    Specimen:  Blood Updated:  10/01/17 0135     Glucose 155 (H) mg/dL      BUN 9 mg/dL      Creatinine 0.72 mg/dL      Sodium 138 mmol/L      Potassium 3.8 mmol/L       1+ Hemolysis         Chloride 104 mmol/L      CO2 25.4 mmol/L      Calcium 9.1 mg/dL      Total Protein 6.5 g/dL      Albumin 3.80 g/dL      ALT (SGPT) 43 (H) U/L      AST (SGOT) 41 (H) U/L      Alkaline Phosphatase 91 U/L       Note New Reference Ranges        Total Bilirubin 0.3 mg/dL      eGFR Non African Amer 80 mL/min/1.73      Globulin 2.7 gm/dL      A/G Ratio 1.4 (L) g/dL      BUN/Creatinine Ratio 12.5     Anion Gap 8.6 mmol/L     Narrative:       The MDRD GFR formula is only valid for adults with stable renal function between ages 18 and 70.    Osmolality, Calculated [[id number]]  (Normal) Collected:  10/01/17 0047    Specimen:  Blood Updated:  10/01/17 0135     Osmolality Calc 277.5 mOsm/kg     Lipase [[id number]]  (Normal) Collected:  10/01/17 0047    Specimen:  Blood Updated:  10/01/17 0136     Lipase 21 U/L       1+ Hemolysis        Amylase [[id number]]  (Normal) Collected:  10/01/17 0047    Specimen:  Blood Updated:  10/01/17 0136     Amylase 43 U/L       1+ Hemolysis            Imaging Results (last 72 hours)     Procedure Component Value Units Date/Time     XR Chest 1 View [[id number]] Collected:  09/29/17 0714     Updated:  09/29/17 0716    Narrative:       EXAMINATION: XR CHEST 1 VW-      CLINICAL INDICATION:     CP; I21.19-ST elevation (STEMI) myocardial  infarction involving other coronary artery of inferior wall     TECHNIQUE:  XR CHEST 1 VW-      COMPARISON: 9/19/2017      FINDINGS:   Lungs are aerated.   Heart and mediastinal contours are unremarkable.   No pneumothorax.   No pleural effusion.   No acute osseous findings.            Impression:       Essentially stable chest demonstrating no acute  cardiopulmonary findings.     This report was finalized on 9/29/2017 7:14 AM by Dr. Deep Marshall MD.       US Venous Doppler Lower Extremity Bilateral (duplex) [[id number]] Collected:  09/30/17 0807     Updated:  09/30/17 0810    Narrative:       US VENOUS DOPPLER LOWER EXTREMITY BILATERAL (DUPLEX)-     REASON FOR EXAM:  edema; R07.9-Chest pain, unspecified     Multiple real-time images were obtained. The deep veins were well  demonstrated sonographically. There is good color doppler signal seen  filling the deep veins. They were completely compressed by the  ultrasound transducer. There was good spontaneous venous flow and  augmentation. There are no echoes seen along the deep veins to suggest  clot.          Impression:       No sonographic findings of DVT in the lower extremities     This report was finalized on 9/30/2017 8:08 AM by Dr. Matthew Pereira II, MD.       CT Abdomen Pelvis With & Without Contrast [[id number]] Collected:  09/30/17 1008     Updated:  09/30/17 1028    Narrative:       CT ABDOMEN PELVIS W WO CONTRAST-     REASON FOR EXAM: Belly pain; R07.9-Chest pain, unspecified     Today's CT is compared with earlier CT scan done in 07/2016. Scans were  obtained prior to and after the administration of IV contrast. The  noncontrasted scans show granulomas in the spleen and atherosclerotic  calcifications in the aorta. There were no calcifications in the  kidneys  or along the course of the ureters. There was no ascites or free air in  the peritoneal cavity. The post contrasted images show diffuse fatty  changes in the liver. The liver has a Hounsfield density reading of 37  as compared to 110 for the spleen. No focal liver lesions were  demonstrated. There were clips in the gallbladder fossa consistent with  previous cholecystectomy. The pancreas showed no evidence of mass or  inflammation. The adrenal glands are unremarkable. The kidneys show  normal cortical enhancement. The aorta was normal in caliber. The bowel  shows no evidence of obstruction or focal inflammation. There were no  ventral hernias.       Impression:       Moderate fatty changes noted throughout the liver. No other  abnormalities were identified.      1189.93385 mGy.cm  The radiation dose reduction device was utilized for each scan per the  ALARA (as low as reasonably achievable) protocol.     This report was finalized on 9/30/2017 10:25 AM by Dr. Matthew Pereira II, MD.             Condition on Discharge:  Stable    Vital Signs  Temp:  [98 °F (36.7 °C)-98.9 °F (37.2 °C)] 98.4 °F (36.9 °C)  Heart Rate:  [71-96] 71  Resp:  [18-20] 20  BP: (106-134)/(59-87) 118/62    Physical Exam:     General Appearance:    Alert, cooperative, in no acute distress   Head:    Normocephalic, without obvious abnormality, atraumatic   Eyes:            Lids and lashes normal, conjunctivae and sclerae normal, no   icterus, no pallor, corneas clear, PERRLA   Ears:    Ears appear intact with no abnormalities noted   Throat:   No oral lesions, no thrush, oral mucosa moist   Neck:   No adenopathy, supple, trachea midline, no thyromegaly, no     carotid bruit, no JVD   Back:     No kyphosis present, no scoliosis present, no skin lesions,       erythema or scars, no tenderness to percussion or                   palpation,   range of motion normal   Lungs:     Clear to auscultation,respirations regular, even and                    unlabored    Heart:    Regular rhythm and normal rate, normal S1 and S2, no            murmur, no gallop, no rub, no click   Breast Exam:    Deferred   Abdomen:     Normal bowel sounds, no masses, no organomegaly, soft        non-tender, non-distended, no guarding, no rebound                 tenderness   Genitalia:    Deferred   Extremities:   Moves all extremities well, no edema, no cyanosis, no              redness   Pulses:   Pulses palpable and equal bilaterally   Skin:   No bleeding, bruising or rash   Lymph nodes:   No palpable adenopathy   Neurologic:   Cranial nerves 2 - 12 grossly intact, sensation intact, DTR        present and equal bilaterally       Discharge Disposition Will be followed this week by Dr. Madi Tidwell      Discharge Medications   Indigo Sorenson   Home Medication Instructions YOVANI:[id number]    Printed on:10/02/17 0619   Medication Information                      ALPRAZolam (XANAX) 0.5 MG tablet  Take 0.5 mg by mouth 4 (Four) Times a Day.             amitriptyline (ELAVIL) 100 MG tablet  Take 1 tablet by mouth Every Night.             aspirin 81 MG EC tablet  Take 81 mg by mouth daily.             furosemide (LASIX) 40 MG tablet  Take 40 mg by mouth Daily.             levothyroxine (SYNTHROID, LEVOTHROID) 125 MCG tablet  Take 125 mcg by mouth daily.             loratadine (CLARITIN) 10 MG tablet  Take 10 mg by mouth Daily.             mirtazapine (REMERON) 7.5 MG tablet  Take 1 tablet by mouth Every Night.             oxyCODONE-acetaminophen (PERCOCET)  MG per tablet  Take 1 tablet by mouth Every 6 (Six) Hours.             pantoprazole (PROTONIX) 40 MG EC tablet  Take 40 mg by mouth Daily.             propranolol (INDERAL) 40 MG tablet  Take 40 mg by mouth 2 (Two) Times a Day.             QUEtiapine (SEROquel) 300 MG tablet  Take 1 tablet by mouth Every Night.             raNITIdine (ZANTAC) 150 MG tablet  Take 150 mg by mouth Daily As Needed for Heartburn.             rOPINIRole  (REQUIP) 1 MG tablet  Take 1 mg by mouth Every Night.             triamcinolone (KENALOG) 0.1 % cream  Apply 1 application topically As Needed for Irritation (under breasts).                 Discharge Diet:  regular    Activity at Discharge:  as tolerated    Follow-up Appointments  Future Appointments  Date Time Provider Department Center   10/3/2017 9:00 AM Mikaela Key MD [facility] None         Test Results Pending at Discharge       FINA Tan  10/02/17  6:19 AM

## 2017-10-02 NOTE — PROGRESS NOTES
Discharge Planning Assessment  JUAN Hurtado     Patient Name: Indigo Sorenson  MRN: [id number]  Today's Date: 10/2/2017    Admit Date: 9/29/2017          Discharge Needs Assessment     None            Discharge Plan       10/02/17 0918    Final Note    Final Note Pt to be discharged home on this date.  No further intervention needed.        Discharge Placement     No information found        Expected Discharge Date and Time     Expected Discharge Date Expected Discharge Time    Oct 2, 2017               Demographic Summary     None            Functional Status     None            Psychosocial     None            Abuse/Neglect     None            Legal     None            Substance Abuse     None            Patient Forms     None          Sandra Jimenes

## 2017-10-17 ENCOUNTER — TELEPHONE (OUTPATIENT)
Dept: PSYCHIATRY | Facility: CLINIC | Age: 72
End: 2017-10-17

## 2017-10-17 NOTE — TELEPHONE ENCOUNTER
Mrs Sood Call and wanted me to let You know that she was sorry she had to reschedule her appointment again her son has been in and out of the hospital and she doesn't have no other transportation     She states that you can contact her

## 2017-11-06 ENCOUNTER — TRANSCRIBE ORDERS (OUTPATIENT)
Dept: ADMINISTRATIVE | Facility: HOSPITAL | Age: 72
End: 2017-11-06

## 2017-11-06 DIAGNOSIS — S09.90XD TRAUMATIC INJURY OF HEAD, SUBSEQUENT ENCOUNTER: Primary | ICD-10-CM

## 2017-11-14 ENCOUNTER — HOSPITAL ENCOUNTER (OUTPATIENT)
Dept: CT IMAGING | Facility: HOSPITAL | Age: 72
Discharge: HOME | End: 2017-11-14
Attending: FAMILY MEDICINE | Admitting: FAMILY MEDICINE

## 2017-11-14 DIAGNOSIS — S09.90XD TRAUMATIC INJURY OF HEAD, SUBSEQUENT ENCOUNTER: ICD-10-CM

## 2017-11-14 PROCEDURE — 70450 CT HEAD/BRAIN W/O DYE: CPT

## 2017-11-14 PROCEDURE — 70450 CT HEAD/BRAIN W/O DYE: CPT | Performed by: RADIOLOGY

## 2017-11-21 ENCOUNTER — APPOINTMENT (OUTPATIENT)
Dept: CT IMAGING | Facility: HOSPITAL | Age: 72
End: 2017-11-21

## 2017-11-21 ENCOUNTER — HOSPITAL ENCOUNTER (EMERGENCY)
Facility: HOSPITAL | Age: 72
Discharge: HOME | End: 2017-11-21
Attending: FAMILY MEDICINE | Admitting: FAMILY MEDICINE

## 2017-11-21 VITALS
DIASTOLIC BLOOD PRESSURE: 74 MMHG | HEART RATE: 72 BPM | TEMPERATURE: 98.3 F | RESPIRATION RATE: 18 BRPM | WEIGHT: 182 LBS | OXYGEN SATURATION: 98 % | SYSTOLIC BLOOD PRESSURE: 112 MMHG | BODY MASS INDEX: 31.07 KG/M2 | HEIGHT: 64 IN

## 2017-11-21 DIAGNOSIS — R51.9 HEADACHE DISORDER: Primary | ICD-10-CM

## 2017-11-21 DIAGNOSIS — F07.81 POST CONCUSSIVE SYNDROME: ICD-10-CM

## 2017-11-21 LAB
ALBUMIN SERPL-MCNC: 4.1 G/DL (ref 3.4–4.8)
ALBUMIN/GLOB SERPL: 1.5 G/DL (ref 1.5–2.5)
ALP SERPL-CCNC: 99 U/L (ref 35–104)
ALT SERPL W P-5'-P-CCNC: 36 U/L (ref 10–36)
ANION GAP SERPL CALCULATED.3IONS-SCNC: 5.4 MMOL/L (ref 3.6–11.2)
AST SERPL-CCNC: 34 U/L (ref 10–30)
BASOPHILS # BLD AUTO: 0.03 10*3/MM3 (ref 0–0.3)
BASOPHILS NFR BLD AUTO: 0.3 % (ref 0–2)
BILIRUB SERPL-MCNC: 0.2 MG/DL (ref 0.2–1.8)
BUN BLD-MCNC: 22 MG/DL (ref 7–21)
BUN/CREAT SERPL: 26.5 (ref 7–25)
CALCIUM SPEC-SCNC: 9.6 MG/DL (ref 7.7–10)
CHLORIDE SERPL-SCNC: 102 MMOL/L (ref 99–112)
CO2 SERPL-SCNC: 32.6 MMOL/L (ref 24.3–31.9)
CREAT BLD-MCNC: 0.83 MG/DL (ref 0.43–1.29)
DEPRECATED RDW RBC AUTO: 43.2 FL (ref 37–54)
EOSINOPHIL # BLD AUTO: 0.38 10*3/MM3 (ref 0–0.7)
EOSINOPHIL NFR BLD AUTO: 4.2 % (ref 0–7)
ERYTHROCYTE [DISTWIDTH] IN BLOOD BY AUTOMATED COUNT: 14.1 % (ref 11.5–14.5)
GFR SERPL CREATININE-BSD FRML MDRD: 68 ML/MIN/1.73
GLOBULIN UR ELPH-MCNC: 2.8 GM/DL
GLUCOSE BLD-MCNC: 108 MG/DL (ref 70–110)
HCT VFR BLD AUTO: 37.2 % (ref 37–47)
HGB BLD-MCNC: 12.1 G/DL (ref 12–16)
IMM GRANULOCYTES # BLD: 0.04 10*3/MM3 (ref 0–0.03)
IMM GRANULOCYTES NFR BLD: 0.4 % (ref 0–0.5)
LYMPHOCYTES # BLD AUTO: 3.11 10*3/MM3 (ref 1–3)
LYMPHOCYTES NFR BLD AUTO: 34.1 % (ref 16–46)
MCH RBC QN AUTO: 27.8 PG (ref 27–33)
MCHC RBC AUTO-ENTMCNC: 32.5 G/DL (ref 33–37)
MCV RBC AUTO: 85.3 FL (ref 80–94)
MONOCYTES # BLD AUTO: 0.5 10*3/MM3 (ref 0.1–0.9)
MONOCYTES NFR BLD AUTO: 5.5 % (ref 0–12)
NEUTROPHILS # BLD AUTO: 5.06 10*3/MM3 (ref 1.4–6.5)
NEUTROPHILS NFR BLD AUTO: 55.5 % (ref 40–75)
OSMOLALITY SERPL CALC.SUM OF ELEC: 283.3 MOSM/KG (ref 273–305)
PLATELET # BLD AUTO: 160 10*3/MM3 (ref 130–400)
PMV BLD AUTO: 12.7 FL (ref 6–10)
POTASSIUM BLD-SCNC: 3.5 MMOL/L (ref 3.5–5.3)
PROT SERPL-MCNC: 6.9 G/DL (ref 6–8)
RBC # BLD AUTO: 4.36 10*6/MM3 (ref 4.2–5.4)
SODIUM BLD-SCNC: 140 MMOL/L (ref 135–153)
WBC NRBC COR # BLD: 9.12 10*3/MM3 (ref 4.5–12.5)

## 2017-11-21 PROCEDURE — 25010000002 DEXAMETHASONE PER 1 MG: Performed by: FAMILY MEDICINE

## 2017-11-21 PROCEDURE — 96374 THER/PROPH/DIAG INJ IV PUSH: CPT

## 2017-11-21 PROCEDURE — 70450 CT HEAD/BRAIN W/O DYE: CPT

## 2017-11-21 PROCEDURE — 25010000002 ONDANSETRON PER 1 MG

## 2017-11-21 PROCEDURE — 99284 EMERGENCY DEPT VISIT MOD MDM: CPT

## 2017-11-21 PROCEDURE — 85025 COMPLETE CBC W/AUTO DIFF WBC: CPT | Performed by: FAMILY MEDICINE

## 2017-11-21 PROCEDURE — 70450 CT HEAD/BRAIN W/O DYE: CPT | Performed by: RADIOLOGY

## 2017-11-21 PROCEDURE — 96361 HYDRATE IV INFUSION ADD-ON: CPT

## 2017-11-21 PROCEDURE — 25010000002 PROCHLORPERAZINE EDISYLATE PER 10 MG: Performed by: FAMILY MEDICINE

## 2017-11-21 PROCEDURE — 25010000002 KETOROLAC TROMETHAMINE PER 15 MG: Performed by: FAMILY MEDICINE

## 2017-11-21 PROCEDURE — 96375 TX/PRO/DX INJ NEW DRUG ADDON: CPT

## 2017-11-21 PROCEDURE — 80053 COMPREHEN METABOLIC PANEL: CPT | Performed by: FAMILY MEDICINE

## 2017-11-21 RX ORDER — ONDANSETRON 2 MG/ML
INJECTION INTRAMUSCULAR; INTRAVENOUS
Status: COMPLETED
Start: 2017-11-21 | End: 2017-11-21

## 2017-11-21 RX ORDER — ONDANSETRON 2 MG/ML
4 INJECTION INTRAMUSCULAR; INTRAVENOUS ONCE
Status: COMPLETED | OUTPATIENT
Start: 2017-11-21 | End: 2017-11-21

## 2017-11-21 RX ORDER — BUMETANIDE 1 MG/1
1 TABLET ORAL DAILY
Status: ON HOLD | COMMUNITY
End: 2018-05-16

## 2017-11-21 RX ORDER — ACETAMINOPHEN 500 MG
TABLET ORAL
Status: COMPLETED
Start: 2017-11-21 | End: 2017-11-21

## 2017-11-21 RX ORDER — SODIUM CHLORIDE 0.9 % (FLUSH) 0.9 %
10 SYRINGE (ML) INJECTION AS NEEDED
Status: DISCONTINUED | OUTPATIENT
Start: 2017-11-21 | End: 2017-11-21 | Stop reason: HOSPADM

## 2017-11-21 RX ORDER — DEXAMETHASONE SODIUM PHOSPHATE 4 MG/ML
4 INJECTION, SOLUTION INTRA-ARTICULAR; INTRALESIONAL; INTRAMUSCULAR; INTRAVENOUS; SOFT TISSUE ONCE
Status: COMPLETED | OUTPATIENT
Start: 2017-11-21 | End: 2017-11-21

## 2017-11-21 RX ORDER — KETOROLAC TROMETHAMINE 30 MG/ML
15 INJECTION, SOLUTION INTRAMUSCULAR; INTRAVENOUS ONCE
Status: COMPLETED | OUTPATIENT
Start: 2017-11-21 | End: 2017-11-21

## 2017-11-21 RX ORDER — ACETAMINOPHEN 500 MG
1000 TABLET ORAL ONCE
Status: COMPLETED | OUTPATIENT
Start: 2017-11-21 | End: 2017-11-21

## 2017-11-21 RX ADMIN — Medication 1000 MG: at 19:30

## 2017-11-21 RX ADMIN — PROCHLORPERAZINE EDISYLATE 10 MG: 5 INJECTION INTRAMUSCULAR; INTRAVENOUS at 17:38

## 2017-11-21 RX ADMIN — ONDANSETRON 4 MG: 2 INJECTION INTRAMUSCULAR; INTRAVENOUS at 19:26

## 2017-11-21 RX ADMIN — KETOROLAC TROMETHAMINE 15 MG: 30 INJECTION, SOLUTION INTRAMUSCULAR at 17:37

## 2017-11-21 RX ADMIN — ACETAMINOPHEN 1000 MG: 500 TABLET ORAL at 19:30

## 2017-11-21 RX ADMIN — DEXAMETHASONE SODIUM PHOSPHATE 4 MG: 4 INJECTION, SOLUTION INTRAMUSCULAR; INTRAVENOUS at 17:51

## 2017-11-21 RX ADMIN — SODIUM CHLORIDE 1000 ML: 9 INJECTION, SOLUTION INTRAVENOUS at 17:35

## 2017-11-21 RX ADMIN — ONDANSETRON 4 MG: 2 INJECTION, SOLUTION INTRAMUSCULAR; INTRAVENOUS at 19:26

## 2017-11-21 NOTE — ED NOTES
PT STATES SHE HAD A FALL ABOUT A MONTH AGO WHERE SHE STRUCK HER HEAD AND SHE HAS HAD HEADACHES SINCE.  WHEN SHE CALLED HER PCP TODAY THEY TOLD HER TO COME TO THE ER     Suzy Jules RN  11/21/17 2308

## 2017-11-21 NOTE — ED PROVIDER NOTES
"Subjective   History of Present Illness  73 y/o F here w/ one month of continued HA after a fall approx 2.5 month ago. Pt states that she has had intermittent HA that are global. Pt states that she has had MRI and CT scans since this happened. Per pt, her MRI showed that she was \"not getting enough blood flow to the brain.\" Pt has not been seen by neurology yet and thus far says she has been told that she is suffering the ramifications of a concussion sustained during her fall. Pt is not on blood thinning medication. Pt does admit some unsteady gait since falling but denies any dizziness.   Review of Systems   Constitutional: Negative for chills, fatigue and fever.   Eyes: Negative for photophobia and visual disturbance.   Respiratory: Negative for cough, chest tightness, shortness of breath and wheezing.    Cardiovascular: Negative for chest pain, palpitations and leg swelling.   Gastrointestinal: Negative for abdominal distention, abdominal pain, constipation, diarrhea, nausea and vomiting.   Genitourinary: Negative for difficulty urinating and dysuria.   Musculoskeletal: Negative for back pain and neck pain.   Skin: Negative for color change and pallor.   Neurological: Positive for headaches. Negative for dizziness, tremors, seizures, syncope, weakness and numbness.   Hematological: Does not bruise/bleed easily.   All other systems reviewed and are negative.      Past Medical History:   Diagnosis Date   • Anxiety    • Coronary artery disease    • Degenerative disorder of bone    • Depression    • Disease of thyroid gland    • Fall 09/2017   • Fibromyalgia    • GERD (gastroesophageal reflux disease)    • Hyperlipidemia    • Hypertension    • Injury of back    • Migraine        Allergies   Allergen Reactions   • Procaine        Past Surgical History:   Procedure Laterality Date   • APPENDECTOMY     • CARDIAC CATHETERIZATION     • CARDIAC CATHETERIZATION     • CHOLECYSTECTOMY     • ENDOSCOPY N/A 7/5/2016    " Procedure: ESOPHAGOGASTRODUODENOSCOPY;  Surgeon: Ziggy Sprague III, MD;  Location: [facility] OR;  Service:    • ENDOSCOPY N/A 7/5/2016    Procedure: ESOPHAGOGASTRODUODENOSCOPY WITH DILATATION;  Surgeon: Ziggy Sprague III, MD;  Location: [facility] OR;  Service:    • HYSTERECTOMY         Family History   Problem Relation Age of Onset   • Cancer Mother      Pancreatic cancer   • Cancer Father    • Heart disease Brother        Social History     Social History   • Marital status: Single     Spouse name: N/A   • Number of children: N/A   • Years of education: N/A     Social History Main Topics   • Smoking status: Never Smoker   • Smokeless tobacco: Never Used   • Alcohol use No   • Drug use: No   • Sexual activity: Defer     Other Topics Concern   • None     Social History Narrative   • None           Objective   Physical Exam   Constitutional: She is oriented to person, place, and time. She appears well-developed and well-nourished. She is active.   HENT:   Head: Normocephalic and atraumatic.   Right Ear: Hearing and external ear normal.   Left Ear: Hearing and external ear normal.   Nose: Nose normal.   Mouth/Throat: Uvula is midline, oropharynx is clear and moist and mucous membranes are normal.   Eyes: Conjunctivae, EOM and lids are normal. Pupils are equal, round, and reactive to light.   Neck: Trachea normal, normal range of motion, full passive range of motion without pain and phonation normal. Neck supple.   Cardiovascular: Normal rate, regular rhythm and normal heart sounds.    Pulmonary/Chest: Effort normal and breath sounds normal.   Abdominal: Normal appearance.   Neurological: She is alert and oriented to person, place, and time. No sensory deficit. Gait abnormal. GCS eye subscore is 4. GCS verbal subscore is 5. GCS motor subscore is 6.   Pt had unsteady gait walking back to her room   Skin: Skin is warm, dry and intact.   Psychiatric: She has a normal mood and affect. Her speech is normal and behavior  is normal. Cognition and memory are normal.   Nursing note and vitals reviewed.      Procedures         ED Course  ED Course      Pt symptoms have been ongoing for 2.5 months and have been worked up multiple times according to the pt. Pt's gait unsteadiness is known and has been w/u. Pt has not been evaluated by neurology yet. Pt HA completely resolved with medication. Pt CT head negative for acute bleed. Pt states that her PCP has an MRI scheduled for the near future to continue w/u of pt's symptoms. Pt given referral to Dr Dietrich and encouraged to f/u in near future to continue w/u.             MDM  Number of Diagnoses or Management Options  Headache disorder: established and worsening  Post concussive syndrome: established and worsening     Amount and/or Complexity of Data Reviewed  Clinical lab tests: reviewed and ordered  Tests in the radiology section of CPT®: reviewed and ordered  Independent visualization of images, tracings, or specimens: yes    Risk of Complications, Morbidity, and/or Mortality  Presenting problems: high  Diagnostic procedures: high  Management options: high    Patient Progress  Patient progress: stable      Final diagnoses:   Headache disorder   Post concussive syndrome            Mark Hampton MD  11/21/17 5281

## 2017-11-21 NOTE — ED NOTES
"Patient presents to Emergency Department with complaints of severe headache \"10\" on scale 1-10. Patient reports she fell a couple of months ago in her bedroom at [facility] and received treatment after striking her head on the floor.  Patient has continued to have severe headaches since the fall.  She reports she has had 2 CT scans of her head since the fall, the last one a week ago. (Patient reports she does not know the results of the 2nd scan.)     Keyla Kaur RN  11/21/17 0446    "

## 2017-11-22 NOTE — ED NOTES
Patient sitting up on stretcher. Side rails up x2. Patient alert & oriented x4. No acute distress noted. No complaints voiced. Will continue to monitor.      Keyla Kaur RN  11/21/17 8356

## 2017-11-22 NOTE — ED NOTES
Patient sitting up on stretcher. Hourly rounding performed. Patient is alert & oriented x4, No acute distress noted. No complaints voiced. Will continue to monitor.      Keyla Kaur RN  11/21/17 9275

## 2017-11-22 NOTE — ED NOTES
Patient states that she needs to take her evening meds. States that she takes them at 6 pm nightly. Discussed with MD, informed patient that normally we do not provide home meds in the ED. Patient states that she still has a light headache. No photophobia noted. No complaints of nausea or vomiting. NADN. TM. Updated on POC. Call light within reach.      Sheri Grey RN  11/21/17 2234

## 2017-11-24 ENCOUNTER — APPOINTMENT (OUTPATIENT)
Dept: GENERAL RADIOLOGY | Facility: HOSPITAL | Age: 72
End: 2017-11-24

## 2017-11-24 ENCOUNTER — HOSPITAL ENCOUNTER (EMERGENCY)
Facility: HOSPITAL | Age: 72
Discharge: HOME | End: 2017-11-24
Attending: FAMILY MEDICINE | Admitting: FAMILY MEDICINE

## 2017-11-24 VITALS
BODY MASS INDEX: 32.44 KG/M2 | TEMPERATURE: 97.9 F | OXYGEN SATURATION: 96 % | HEART RATE: 95 BPM | HEIGHT: 64 IN | SYSTOLIC BLOOD PRESSURE: 151 MMHG | DIASTOLIC BLOOD PRESSURE: 70 MMHG | RESPIRATION RATE: 20 BRPM | WEIGHT: 190 LBS

## 2017-11-24 DIAGNOSIS — R07.9 CHEST PAIN IN ADULT: Primary | ICD-10-CM

## 2017-11-24 LAB
ALBUMIN SERPL-MCNC: 4.1 G/DL (ref 3.4–4.8)
ALBUMIN/GLOB SERPL: 1.4 G/DL (ref 1.5–2.5)
ALP SERPL-CCNC: 95 U/L (ref 35–104)
ALT SERPL W P-5'-P-CCNC: 51 U/L (ref 10–36)
ANION GAP SERPL CALCULATED.3IONS-SCNC: 8.1 MMOL/L (ref 3.6–11.2)
APTT PPP: 22.6 SECONDS (ref 23.8–36.1)
AST SERPL-CCNC: 43 U/L (ref 10–30)
BACTERIA UR QL AUTO: ABNORMAL /HPF
BASOPHILS # BLD AUTO: 0.05 10*3/MM3 (ref 0–0.3)
BASOPHILS NFR BLD AUTO: 0.5 % (ref 0–2)
BILIRUB SERPL-MCNC: 0.2 MG/DL (ref 0.2–1.8)
BILIRUB UR QL STRIP: NEGATIVE
BNP SERPL-MCNC: 62 PG/ML (ref 0–100)
BUN BLD-MCNC: 18 MG/DL (ref 7–21)
BUN/CREAT SERPL: 20.2 (ref 7–25)
CALCIUM SPEC-SCNC: 9.1 MG/DL (ref 7.7–10)
CHLORIDE SERPL-SCNC: 99 MMOL/L (ref 99–112)
CK MB SERPL-CCNC: 4.66 NG/ML (ref 0–5)
CK MB SERPL-CCNC: 4.81 NG/ML (ref 0–5)
CK MB SERPL-RTO: 2.5 % (ref 0–3)
CK MB SERPL-RTO: 2.6 % (ref 0–3)
CK SERPL-CCNC: 177 U/L (ref 24–173)
CK SERPL-CCNC: 194 U/L (ref 24–173)
CLARITY UR: ABNORMAL
CO2 SERPL-SCNC: 31.9 MMOL/L (ref 24.3–31.9)
COLOR UR: YELLOW
CREAT BLD-MCNC: 0.89 MG/DL (ref 0.43–1.29)
DEPRECATED RDW RBC AUTO: 44.6 FL (ref 37–54)
EOSINOPHIL # BLD AUTO: 0.44 10*3/MM3 (ref 0–0.7)
EOSINOPHIL NFR BLD AUTO: 4.7 % (ref 0–7)
ERYTHROCYTE [DISTWIDTH] IN BLOOD BY AUTOMATED COUNT: 14.4 % (ref 11.5–14.5)
GFR SERPL CREATININE-BSD FRML MDRD: 62 ML/MIN/1.73
GLOBULIN UR ELPH-MCNC: 3 GM/DL
GLUCOSE BLD-MCNC: 100 MG/DL (ref 70–110)
GLUCOSE UR STRIP-MCNC: NEGATIVE MG/DL
HCT VFR BLD AUTO: 37.5 % (ref 37–47)
HGB BLD-MCNC: 12.1 G/DL (ref 12–16)
HGB UR QL STRIP.AUTO: NEGATIVE
HYALINE CASTS UR QL AUTO: ABNORMAL /LPF
IMM GRANULOCYTES # BLD: 0.07 10*3/MM3 (ref 0–0.03)
IMM GRANULOCYTES NFR BLD: 0.8 % (ref 0–0.5)
INR PPP: 0.93 (ref 0.9–1.1)
KETONES UR QL STRIP: NEGATIVE
LEUKOCYTE ESTERASE UR QL STRIP.AUTO: ABNORMAL
LYMPHOCYTES # BLD AUTO: 3.64 10*3/MM3 (ref 1–3)
LYMPHOCYTES NFR BLD AUTO: 39.2 % (ref 16–46)
MCH RBC QN AUTO: 27.9 PG (ref 27–33)
MCHC RBC AUTO-ENTMCNC: 32.3 G/DL (ref 33–37)
MCV RBC AUTO: 86.6 FL (ref 80–94)
MONOCYTES # BLD AUTO: 0.7 10*3/MM3 (ref 0.1–0.9)
MONOCYTES NFR BLD AUTO: 7.5 % (ref 0–12)
NEUTROPHILS # BLD AUTO: 4.39 10*3/MM3 (ref 1.4–6.5)
NEUTROPHILS NFR BLD AUTO: 47.3 % (ref 40–75)
NITRITE UR QL STRIP: NEGATIVE
OSMOLALITY SERPL CALC.SUM OF ELEC: 279.5 MOSM/KG (ref 273–305)
PH UR STRIP.AUTO: 7 [PH] (ref 5–8)
PLATELET # BLD AUTO: 155 10*3/MM3 (ref 130–400)
PMV BLD AUTO: 12.3 FL (ref 6–10)
POTASSIUM BLD-SCNC: 3.6 MMOL/L (ref 3.5–5.3)
PROT SERPL-MCNC: 7.1 G/DL (ref 6–8)
PROT UR QL STRIP: NEGATIVE
PROTHROMBIN TIME: 12.6 SECONDS (ref 11–15.4)
RBC # BLD AUTO: 4.33 10*6/MM3 (ref 4.2–5.4)
RBC # UR: ABNORMAL /HPF
REF LAB TEST METHOD: ABNORMAL
SODIUM BLD-SCNC: 139 MMOL/L (ref 135–153)
SP GR UR STRIP: 1.01 (ref 1–1.03)
SQUAMOUS #/AREA URNS HPF: ABNORMAL /HPF
TRANS CELLS #/AREA URNS HPF: ABNORMAL /HPF
TROPONIN I SERPL-MCNC: 0.01 NG/ML
TROPONIN I SERPL-MCNC: 0.01 NG/ML
UROBILINOGEN UR QL STRIP: ABNORMAL
WBC NRBC COR # BLD: 9.29 10*3/MM3 (ref 4.5–12.5)
WBC UR QL AUTO: ABNORMAL /HPF

## 2017-11-24 PROCEDURE — 84484 ASSAY OF TROPONIN QUANT: CPT | Performed by: FAMILY MEDICINE

## 2017-11-24 PROCEDURE — 96374 THER/PROPH/DIAG INJ IV PUSH: CPT

## 2017-11-24 PROCEDURE — 82553 CREATINE MB FRACTION: CPT | Performed by: FAMILY MEDICINE

## 2017-11-24 PROCEDURE — 25010000002 MORPHINE PER 10 MG: Performed by: FAMILY MEDICINE

## 2017-11-24 PROCEDURE — 87086 URINE CULTURE/COLONY COUNT: CPT | Performed by: FAMILY MEDICINE

## 2017-11-24 PROCEDURE — 85610 PROTHROMBIN TIME: CPT | Performed by: FAMILY MEDICINE

## 2017-11-24 PROCEDURE — 83880 ASSAY OF NATRIURETIC PEPTIDE: CPT | Performed by: FAMILY MEDICINE

## 2017-11-24 PROCEDURE — 99284 EMERGENCY DEPT VISIT MOD MDM: CPT

## 2017-11-24 PROCEDURE — 25010000002 KETOROLAC TROMETHAMINE PER 15 MG: Performed by: FAMILY MEDICINE

## 2017-11-24 PROCEDURE — 93010 ELECTROCARDIOGRAM REPORT: CPT | Performed by: INTERNAL MEDICINE

## 2017-11-24 PROCEDURE — 85730 THROMBOPLASTIN TIME PARTIAL: CPT | Performed by: FAMILY MEDICINE

## 2017-11-24 PROCEDURE — 71010 HC CHEST PA OR AP: CPT

## 2017-11-24 PROCEDURE — 82550 ASSAY OF CK (CPK): CPT | Performed by: FAMILY MEDICINE

## 2017-11-24 PROCEDURE — 25010000002 ONDANSETRON PER 1 MG: Performed by: FAMILY MEDICINE

## 2017-11-24 PROCEDURE — 80053 COMPREHEN METABOLIC PANEL: CPT | Performed by: FAMILY MEDICINE

## 2017-11-24 PROCEDURE — 85025 COMPLETE CBC W/AUTO DIFF WBC: CPT | Performed by: FAMILY MEDICINE

## 2017-11-24 PROCEDURE — 36415 COLL VENOUS BLD VENIPUNCTURE: CPT

## 2017-11-24 PROCEDURE — 81001 URINALYSIS AUTO W/SCOPE: CPT | Performed by: FAMILY MEDICINE

## 2017-11-24 PROCEDURE — 93005 ELECTROCARDIOGRAM TRACING: CPT | Performed by: FAMILY MEDICINE

## 2017-11-24 PROCEDURE — 96375 TX/PRO/DX INJ NEW DRUG ADDON: CPT

## 2017-11-24 PROCEDURE — 71010 XR CHEST 1 VW: CPT | Performed by: RADIOLOGY

## 2017-11-24 RX ORDER — ASPIRIN 81 MG/1
324 TABLET, CHEWABLE ORAL ONCE
Status: COMPLETED | OUTPATIENT
Start: 2017-11-24 | End: 2017-11-24

## 2017-11-24 RX ORDER — MORPHINE SULFATE 2 MG/ML
2 INJECTION, SOLUTION INTRAMUSCULAR; INTRAVENOUS ONCE
Status: COMPLETED | OUTPATIENT
Start: 2017-11-24 | End: 2017-11-24

## 2017-11-24 RX ORDER — ONDANSETRON 2 MG/ML
4 INJECTION INTRAMUSCULAR; INTRAVENOUS ONCE
Status: COMPLETED | OUTPATIENT
Start: 2017-11-24 | End: 2017-11-24

## 2017-11-24 RX ORDER — KETOROLAC TROMETHAMINE 30 MG/ML
15 INJECTION, SOLUTION INTRAMUSCULAR; INTRAVENOUS ONCE
Status: COMPLETED | OUTPATIENT
Start: 2017-11-24 | End: 2017-11-24

## 2017-11-24 RX ORDER — SODIUM CHLORIDE 0.9 % (FLUSH) 0.9 %
10 SYRINGE (ML) INJECTION AS NEEDED
Status: DISCONTINUED | OUTPATIENT
Start: 2017-11-24 | End: 2017-11-24 | Stop reason: HOSPADM

## 2017-11-24 RX ADMIN — KETOROLAC TROMETHAMINE 15 MG: 30 INJECTION, SOLUTION INTRAMUSCULAR at 04:28

## 2017-11-24 RX ADMIN — MORPHINE SULFATE 2 MG: 2 INJECTION, SOLUTION INTRAMUSCULAR; INTRAVENOUS at 03:20

## 2017-11-24 RX ADMIN — ASPIRIN 324 MG: 81 TABLET, CHEWABLE ORAL at 02:25

## 2017-11-24 RX ADMIN — ONDANSETRON 4 MG: 2 INJECTION, SOLUTION INTRAMUSCULAR; INTRAVENOUS at 03:19

## 2017-11-24 RX ADMIN — NITROGLYCERIN 1 INCH: 20 OINTMENT TOPICAL at 02:42

## 2017-11-24 NOTE — ED NOTES
Pt states her pain is still an 8/10 at this time, provider made aware, new orders received.     Kalie Lawrence RN  11/24/17 032

## 2017-11-24 NOTE — ED NOTES
Faxed outpatient stress test order form to scheduling department.     Sheri Fisher  11/24/17 0616

## 2017-11-24 NOTE — ED NOTES
Pt states she is still having CP, rating it an 8/10 at this time, provider made aware, new orders received.     Kalie Lawrence RN  11/24/17 8918

## 2017-11-24 NOTE — ED PROVIDER NOTES
Subjective   Patient is a 72 y.o. female presenting with chest pain.   History provided by:  Patient and relative  Chest Pain   Pain location:  L chest  Pain quality: aching    Pain radiates to:  Neck  Onset quality:  Sudden  Duration:  30 minutes  Timing:  Constant  Progression:  Unchanged  Chronicity:  Recurrent  Context: at rest    Relieved by:  Nothing  Worsened by:  Nothing  Ineffective treatments:  None tried  Associated symptoms: no abdominal pain, no cough, no dizziness, no fatigue, no headache, no nausea, no shortness of breath, no vomiting and no weakness    Risk factors: coronary artery disease, high cholesterol and hypertension        Review of Systems   Constitutional: Negative for activity change, appetite change, chills and fatigue.   HENT: Negative for congestion.    Eyes: Negative for pain.   Respiratory: Negative for cough, shortness of breath, wheezing and stridor.    Cardiovascular: Positive for chest pain.   Gastrointestinal: Negative for abdominal pain, diarrhea, nausea and vomiting.   Genitourinary: Negative for dysuria.   Musculoskeletal: Negative for arthralgias, myalgias, neck pain and neck stiffness.   Skin: Negative for rash.   Neurological: Negative for dizziness, syncope, speech difficulty, weakness and headaches.   Psychiatric/Behavioral: Negative for agitation.   All other systems reviewed and are negative.      Past Medical History:   Diagnosis Date   • Anxiety    • Coronary artery disease    • Degenerative disorder of bone    • Depression    • Disease of thyroid gland    • Fall 09/2017   • Fibromyalgia    • GERD (gastroesophageal reflux disease)    • Hyperlipidemia    • Hypertension    • Injury of back    • Migraine        Allergies   Allergen Reactions   • Procaine        Past Surgical History:   Procedure Laterality Date   • APPENDECTOMY     • CARDIAC CATHETERIZATION     • CARDIAC CATHETERIZATION     • CHOLECYSTECTOMY     • ENDOSCOPY N/A 7/5/2016    Procedure:  ESOPHAGOGASTRODUODENOSCOPY;  Surgeon: Ziggy Sprague III, MD;  Location:  [facility] OR;  Service:    • ENDOSCOPY N/A 7/5/2016    Procedure: ESOPHAGOGASTRODUODENOSCOPY WITH DILATATION;  Surgeon: Ziggy Sprague III, MD;  Location:  [facility] OR;  Service:    • HYSTERECTOMY         Family History   Problem Relation Age of Onset   • Cancer Mother      Pancreatic cancer   • Cancer Father    • Heart disease Brother        Social History     Social History   • Marital status: Single     Spouse name: N/A   • Number of children: N/A   • Years of education: N/A     Social History Main Topics   • Smoking status: Never Smoker   • Smokeless tobacco: Never Used   • Alcohol use No   • Drug use: No   • Sexual activity: Defer     Other Topics Concern   • None     Social History Narrative           Objective   Physical Exam   Constitutional: She is oriented to person, place, and time. She appears well-developed and well-nourished.   HENT:   Head: Normocephalic and atraumatic.   Right Ear: External ear normal.   Left Ear: External ear normal.   Nose: Nose normal.   Mouth/Throat: Oropharynx is clear and moist.   Eyes: EOM are normal. Pupils are equal, round, and reactive to light.   Neck: Neck supple. No tracheal deviation present. No thyromegaly present.   Cardiovascular: Normal rate and regular rhythm.    Pulmonary/Chest: Effort normal and breath sounds normal.   Abdominal: Soft. Bowel sounds are normal. She exhibits no distension. There is no tenderness.   Musculoskeletal: Normal range of motion.   Neurological: She is alert and oriented to person, place, and time.   Skin: Skin is warm.   Psychiatric: She has a normal mood and affect. Her behavior is normal. Judgment and thought content normal.   Nursing note and vitals reviewed.      Procedures         ED Course  ED Course   Value Comment By Time   ECG 12 Lead EKG interpretation is 220  Sinus rhythm 81 bpm nonspecific ST changes compared to previous EKGs similar changes were  present at that time no evidence of acute ischemic change QT is 372 QTc is 432 Joaquina Carreno, DO 11/24 0256    HEART score- 4 Joaquina Carreno, DO 11/24 0541    LHC- 3/2016  RCA with mild disease 30-40%   ECHO  EF 75%   Mild grade 1 diastolic dysfunction Joaquina Carreno, DO 11/24 0542    Reviewed case with Dr RANJITH Tidwell who agrees that pt has had a C within the last year with minimal disease; has been admitted 3 months ago for chest pain and evaluated by cardiology - felt that outpt stress test would be next step for patient.  Order faxed and given to patient Joaquina Carreno, DO 11/24 0613    PT is up ambulating and feeling better is in agreement with outpt work up Joaquina Carreno, DO 11/24 0618                  MDM  Number of Diagnoses or Management Options  Chest pain in adult: new and does not require workup     Amount and/or Complexity of Data Reviewed  Clinical lab tests: ordered and reviewed  Tests in the radiology section of CPT®: ordered and reviewed  Tests in the medicine section of CPT®: reviewed and ordered  Decide to obtain previous medical records or to obtain history from someone other than the patient: yes  Discuss the patient with other providers: yes  Independent visualization of images, tracings, or specimens: yes    Risk of Complications, Morbidity, and/or Mortality  Presenting problems: moderate  Diagnostic procedures: moderate  Management options: moderate    Patient Progress  Patient progress: improved      Final diagnoses:   Chest pain in adult            Joaquina Carreno, DO  11/24/17 5943

## 2017-11-24 NOTE — ED NOTES
Provider at bedside at this time discussing results and plan to discharge home with pt and family, all questions and concerns addressed, pt and family verbalized understanding.     Kalie Lawrence RN  11/24/17 0660

## 2017-11-26 LAB — BACTERIA SPEC AEROBE CULT: NORMAL

## 2017-11-28 ENCOUNTER — TRANSCRIBE ORDERS (OUTPATIENT)
Dept: ADMINISTRATIVE | Facility: HOSPITAL | Age: 72
End: 2017-11-28

## 2017-11-28 DIAGNOSIS — R07.9 CHEST PAIN, UNSPECIFIED TYPE: Primary | ICD-10-CM

## 2017-11-29 ENCOUNTER — HOSPITAL ENCOUNTER (OUTPATIENT)
Dept: NUCLEAR MEDICINE | Facility: HOSPITAL | Age: 72
Discharge: HOME | End: 2017-11-29
Attending: FAMILY MEDICINE

## 2017-11-29 ENCOUNTER — HOSPITAL ENCOUNTER (OUTPATIENT)
Dept: CARDIOLOGY | Facility: HOSPITAL | Age: 72
Discharge: HOME | End: 2017-11-29
Attending: FAMILY MEDICINE

## 2017-11-29 DIAGNOSIS — R07.9 CHEST PAIN, UNSPECIFIED TYPE: ICD-10-CM

## 2017-11-29 PROCEDURE — 93018 CV STRESS TEST I&R ONLY: CPT | Performed by: INTERNAL MEDICINE

## 2017-11-29 PROCEDURE — A9500 TC99M SESTAMIBI: HCPCS | Performed by: FAMILY MEDICINE

## 2017-11-29 PROCEDURE — 0 TECHNETIUM SESTAMIBI: Performed by: FAMILY MEDICINE

## 2017-11-29 PROCEDURE — 93017 CV STRESS TEST TRACING ONLY: CPT

## 2017-11-29 PROCEDURE — 78452 HT MUSCLE IMAGE SPECT MULT: CPT

## 2017-11-29 PROCEDURE — 78452 HT MUSCLE IMAGE SPECT MULT: CPT | Performed by: INTERNAL MEDICINE

## 2017-11-29 PROCEDURE — 25010000002 REGADENOSON 0.4 MG/5ML SOLUTION: Performed by: FAMILY MEDICINE

## 2017-11-29 RX ADMIN — TECHNETIUM TC-99M SESTAMIBI 1 DOSE: 1 INJECTION INTRAVENOUS at 09:00

## 2017-11-29 RX ADMIN — TECHNETIUM TC-99M SESTAMIBI 1 DOSE: 1 INJECTION INTRAVENOUS at 10:48

## 2017-11-29 RX ADMIN — REGADENOSON 0.4 MG: 0.08 INJECTION, SOLUTION INTRAVENOUS at 10:48

## 2017-11-30 LAB
BH CV NUCLEAR PRIOR STUDY: 3
BH CV STRESS BP STAGE 1: NORMAL
BH CV STRESS BP STAGE 2: NORMAL
BH CV STRESS COMMENTS STAGE 1: NORMAL
BH CV STRESS COMMENTS STAGE 2: NORMAL
BH CV STRESS DOSE REGADENOSON STAGE 1: 0.4
BH CV STRESS DURATION MIN STAGE 1: 0
BH CV STRESS DURATION MIN STAGE 2: 4
BH CV STRESS DURATION SEC STAGE 1: 15
BH CV STRESS DURATION SEC STAGE 2: 0
BH CV STRESS HR STAGE 1: 101
BH CV STRESS HR STAGE 2: 96
BH CV STRESS PROTOCOL 1: NORMAL
BH CV STRESS RECOVERY BP: NORMAL MMHG
BH CV STRESS RECOVERY HR: 96 BPM
BH CV STRESS STAGE 1: 1
BH CV STRESS STAGE 2: 2
LV EF NUC BP: 84 %
MAXIMAL PREDICTED HEART RATE: 148 BPM
PERCENT MAX PREDICTED HR: 68.24 %
STRESS BASELINE BP: NORMAL MMHG
STRESS BASELINE HR: 87 BPM
STRESS PERCENT HR: 80 %
STRESS POST PEAK BP: NORMAL MMHG
STRESS POST PEAK HR: 101 BPM
STRESS TARGET HR: 126 BPM

## 2017-12-04 ENCOUNTER — APPOINTMENT (OUTPATIENT)
Dept: GENERAL RADIOLOGY | Facility: HOSPITAL | Age: 72
End: 2017-12-04

## 2017-12-04 ENCOUNTER — HOSPITAL ENCOUNTER (INPATIENT)
Facility: HOSPITAL | Age: 72
LOS: 6 days | Discharge: HOME | End: 2017-12-11
Attending: EMERGENCY MEDICINE | Admitting: FAMILY MEDICINE

## 2017-12-04 DIAGNOSIS — A41.9 SEPSIS, DUE TO UNSPECIFIED ORGANISM: Primary | ICD-10-CM

## 2017-12-04 DIAGNOSIS — N39.0 URINARY TRACT INFECTION WITH HEMATURIA, SITE UNSPECIFIED: ICD-10-CM

## 2017-12-04 DIAGNOSIS — R31.9 URINARY TRACT INFECTION WITH HEMATURIA, SITE UNSPECIFIED: ICD-10-CM

## 2017-12-04 LAB
BACTERIA UR QL AUTO: ABNORMAL /HPF
BASOPHILS # BLD AUTO: 0.03 10*3/MM3 (ref 0–0.3)
BASOPHILS NFR BLD AUTO: 0.2 % (ref 0–2)
BILIRUB UR QL STRIP: NEGATIVE
CLARITY UR: ABNORMAL
COLOR UR: YELLOW
DEPRECATED RDW RBC AUTO: 44.4 FL (ref 37–54)
EOSINOPHIL # BLD AUTO: 0.51 10*3/MM3 (ref 0–0.7)
EOSINOPHIL NFR BLD AUTO: 3.8 % (ref 0–7)
ERYTHROCYTE [DISTWIDTH] IN BLOOD BY AUTOMATED COUNT: 14.6 % (ref 11.5–14.5)
FLUAV AG NPH QL: NEGATIVE
FLUBV AG NPH QL IA: NEGATIVE
GLUCOSE UR STRIP-MCNC: NEGATIVE MG/DL
HCT VFR BLD AUTO: 36.4 % (ref 37–47)
HGB BLD-MCNC: 11.6 G/DL (ref 12–16)
HGB UR QL STRIP.AUTO: ABNORMAL
HYALINE CASTS UR QL AUTO: ABNORMAL /LPF
IMM GRANULOCYTES # BLD: 0.06 10*3/MM3 (ref 0–0.03)
IMM GRANULOCYTES NFR BLD: 0.4 % (ref 0–0.5)
KETONES UR QL STRIP: NEGATIVE
LEUKOCYTE ESTERASE UR QL STRIP.AUTO: ABNORMAL
LYMPHOCYTES # BLD AUTO: 2.88 10*3/MM3 (ref 1–3)
LYMPHOCYTES NFR BLD AUTO: 21.3 % (ref 16–46)
MCH RBC QN AUTO: 27.2 PG (ref 27–33)
MCHC RBC AUTO-ENTMCNC: 31.9 G/DL (ref 33–37)
MCV RBC AUTO: 85.4 FL (ref 80–94)
MONOCYTES # BLD AUTO: 1.07 10*3/MM3 (ref 0.1–0.9)
MONOCYTES NFR BLD AUTO: 7.9 % (ref 0–12)
NEUTROPHILS # BLD AUTO: 8.97 10*3/MM3 (ref 1.4–6.5)
NEUTROPHILS NFR BLD AUTO: 66.4 % (ref 40–75)
NITRITE UR QL STRIP: NEGATIVE
PH UR STRIP.AUTO: 5.5 [PH] (ref 5–8)
PLATELET # BLD AUTO: 160 10*3/MM3 (ref 130–400)
PMV BLD AUTO: 11.9 FL (ref 6–10)
PROT UR QL STRIP: NEGATIVE
RBC # BLD AUTO: 4.26 10*6/MM3 (ref 4.2–5.4)
RBC # UR: ABNORMAL /HPF
REF LAB TEST METHOD: ABNORMAL
SP GR UR STRIP: 1.01 (ref 1–1.03)
SQUAMOUS #/AREA URNS HPF: ABNORMAL /HPF
UROBILINOGEN UR QL STRIP: ABNORMAL
WBC NRBC COR # BLD: 13.52 10*3/MM3 (ref 4.5–12.5)
WBC UR QL AUTO: ABNORMAL /HPF

## 2017-12-04 PROCEDURE — 71020 HC CHEST PA AND LATERAL: CPT

## 2017-12-04 PROCEDURE — 94640 AIRWAY INHALATION TREATMENT: CPT

## 2017-12-04 PROCEDURE — 71020 XR CHEST 2 VW: CPT | Performed by: RADIOLOGY

## 2017-12-04 PROCEDURE — 94799 UNLISTED PULMONARY SVC/PX: CPT

## 2017-12-04 PROCEDURE — 87077 CULTURE AEROBIC IDENTIFY: CPT | Performed by: PHYSICIAN ASSISTANT

## 2017-12-04 PROCEDURE — 25010000002 METHYLPREDNISOLONE PER 125 MG: Performed by: PHYSICIAN ASSISTANT

## 2017-12-04 PROCEDURE — 99284 EMERGENCY DEPT VISIT MOD MDM: CPT

## 2017-12-04 PROCEDURE — 80053 COMPREHEN METABOLIC PANEL: CPT | Performed by: PHYSICIAN ASSISTANT

## 2017-12-04 PROCEDURE — 87086 URINE CULTURE/COLONY COUNT: CPT | Performed by: PHYSICIAN ASSISTANT

## 2017-12-04 PROCEDURE — 81001 URINALYSIS AUTO W/SCOPE: CPT | Performed by: PHYSICIAN ASSISTANT

## 2017-12-04 PROCEDURE — 93005 ELECTROCARDIOGRAM TRACING: CPT | Performed by: PHYSICIAN ASSISTANT

## 2017-12-04 PROCEDURE — 93010 ELECTROCARDIOGRAM REPORT: CPT | Performed by: INTERNAL MEDICINE

## 2017-12-04 PROCEDURE — 87804 INFLUENZA ASSAY W/OPTIC: CPT | Performed by: PHYSICIAN ASSISTANT

## 2017-12-04 PROCEDURE — 87186 SC STD MICRODIL/AGAR DIL: CPT | Performed by: PHYSICIAN ASSISTANT

## 2017-12-04 PROCEDURE — 85025 COMPLETE CBC W/AUTO DIFF WBC: CPT | Performed by: PHYSICIAN ASSISTANT

## 2017-12-04 RX ORDER — METHYLPREDNISOLONE SODIUM SUCCINATE 125 MG/2ML
125 INJECTION, POWDER, LYOPHILIZED, FOR SOLUTION INTRAMUSCULAR; INTRAVENOUS ONCE
Status: COMPLETED | OUTPATIENT
Start: 2017-12-04 | End: 2017-12-04

## 2017-12-04 RX ORDER — SODIUM CHLORIDE 0.9 % (FLUSH) 0.9 %
10 SYRINGE (ML) INJECTION AS NEEDED
Status: DISCONTINUED | OUTPATIENT
Start: 2017-12-04 | End: 2017-12-11 | Stop reason: HOSPADM

## 2017-12-04 RX ORDER — IPRATROPIUM BROMIDE AND ALBUTEROL SULFATE 2.5; .5 MG/3ML; MG/3ML
3 SOLUTION RESPIRATORY (INHALATION) ONCE
Status: COMPLETED | OUTPATIENT
Start: 2017-12-04 | End: 2017-12-04

## 2017-12-04 RX ADMIN — METHYLPREDNISOLONE SODIUM SUCCINATE 125 MG: 125 INJECTION, POWDER, FOR SOLUTION INTRAMUSCULAR; INTRAVENOUS at 23:34

## 2017-12-04 RX ADMIN — IPRATROPIUM BROMIDE AND ALBUTEROL SULFATE 3 ML: .5; 3 SOLUTION RESPIRATORY (INHALATION) at 23:44

## 2017-12-05 PROBLEM — A41.9 SEPSIS (HCC): Status: ACTIVE | Noted: 2017-12-05

## 2017-12-05 LAB
ALBUMIN SERPL-MCNC: 4.4 G/DL (ref 3.4–4.8)
ALBUMIN/GLOB SERPL: 1.4 G/DL (ref 1.5–2.5)
ALP SERPL-CCNC: 113 U/L (ref 35–104)
ALT SERPL W P-5'-P-CCNC: 53 U/L (ref 10–36)
ANION GAP SERPL CALCULATED.3IONS-SCNC: 8.5 MMOL/L (ref 3.6–11.2)
AST SERPL-CCNC: 43 U/L (ref 10–30)
BILIRUB SERPL-MCNC: 0.4 MG/DL (ref 0.2–1.8)
BNP SERPL-MCNC: 116 PG/ML (ref 0–100)
BUN BLD-MCNC: 11 MG/DL (ref 7–21)
BUN/CREAT SERPL: 13.6 (ref 7–25)
CALCIUM SPEC-SCNC: 10 MG/DL (ref 7.7–10)
CHLORIDE SERPL-SCNC: 100 MMOL/L (ref 99–112)
CK MB SERPL-CCNC: 4.49 NG/ML (ref 0–5)
CK MB SERPL-RTO: 1.3 % (ref 0–3)
CK SERPL-CCNC: 337 U/L (ref 24–173)
CO2 SERPL-SCNC: 29.5 MMOL/L (ref 24.3–31.9)
CREAT BLD-MCNC: 0.81 MG/DL (ref 0.43–1.29)
D-LACTATE SERPL-SCNC: 2.2 MMOL/L (ref 0.5–2)
D-LACTATE SERPL-SCNC: 3.2 MMOL/L (ref 0.5–2)
GFR SERPL CREATININE-BSD FRML MDRD: 70 ML/MIN/1.73
GLOBULIN UR ELPH-MCNC: 3.1 GM/DL
GLUCOSE BLD-MCNC: 120 MG/DL (ref 70–110)
HOLD SPECIMEN: NORMAL
OSMOLALITY SERPL CALC.SUM OF ELEC: 276.3 MOSM/KG (ref 273–305)
POTASSIUM BLD-SCNC: 3.5 MMOL/L (ref 3.5–5.3)
PROT SERPL-MCNC: 7.5 G/DL (ref 6–8)
SODIUM BLD-SCNC: 138 MMOL/L (ref 135–153)
TROPONIN I SERPL-MCNC: 0.01 NG/ML
TROPONIN I SERPL-MCNC: 0.01 NG/ML
TROPONIN I SERPL-MCNC: 0.02 NG/ML

## 2017-12-05 PROCEDURE — 94799 UNLISTED PULMONARY SVC/PX: CPT

## 2017-12-05 PROCEDURE — 93010 ELECTROCARDIOGRAM REPORT: CPT | Performed by: INTERNAL MEDICINE

## 2017-12-05 PROCEDURE — 25010000002 PIPERACILLIN-TAZOBACTAM: Performed by: FAMILY MEDICINE

## 2017-12-05 PROCEDURE — 93005 ELECTROCARDIOGRAM TRACING: CPT | Performed by: FAMILY MEDICINE

## 2017-12-05 PROCEDURE — 36415 COLL VENOUS BLD VENIPUNCTURE: CPT

## 2017-12-05 PROCEDURE — 83880 ASSAY OF NATRIURETIC PEPTIDE: CPT | Performed by: PHYSICIAN ASSISTANT

## 2017-12-05 PROCEDURE — 87040 BLOOD CULTURE FOR BACTERIA: CPT | Performed by: PHYSICIAN ASSISTANT

## 2017-12-05 PROCEDURE — 93005 ELECTROCARDIOGRAM TRACING: CPT | Performed by: PHYSICIAN ASSISTANT

## 2017-12-05 PROCEDURE — 82553 CREATINE MB FRACTION: CPT | Performed by: FAMILY MEDICINE

## 2017-12-05 PROCEDURE — 83605 ASSAY OF LACTIC ACID: CPT | Performed by: PHYSICIAN ASSISTANT

## 2017-12-05 PROCEDURE — 82550 ASSAY OF CK (CPK): CPT | Performed by: FAMILY MEDICINE

## 2017-12-05 PROCEDURE — 25010000002 CEFTRIAXONE: Performed by: FAMILY MEDICINE

## 2017-12-05 PROCEDURE — 25010000002 CEFTRIAXONE: Performed by: PHYSICIAN ASSISTANT

## 2017-12-05 PROCEDURE — 84484 ASSAY OF TROPONIN QUANT: CPT | Performed by: FAMILY MEDICINE

## 2017-12-05 RX ORDER — SODIUM CHLORIDE 9 MG/ML
INJECTION, SOLUTION INTRAVENOUS
Status: DISPENSED
Start: 2017-12-05 | End: 2017-12-05

## 2017-12-05 RX ORDER — OXYCODONE AND ACETAMINOPHEN 10; 325 MG/1; MG/1
1 TABLET ORAL 4 TIMES DAILY PRN
Status: DISCONTINUED | OUTPATIENT
Start: 2017-12-05 | End: 2017-12-11 | Stop reason: HOSPADM

## 2017-12-05 RX ORDER — AMITRIPTYLINE HYDROCHLORIDE 50 MG/1
100 TABLET, FILM COATED ORAL NIGHTLY
Status: DISCONTINUED | OUTPATIENT
Start: 2017-12-05 | End: 2017-12-11 | Stop reason: HOSPADM

## 2017-12-05 RX ORDER — ONDANSETRON 4 MG/1
4 TABLET, FILM COATED ORAL 3 TIMES DAILY
Status: ON HOLD | COMMUNITY
End: 2018-05-16

## 2017-12-05 RX ORDER — METOPROLOL TARTRATE 50 MG/1
50 TABLET, FILM COATED ORAL EVERY 12 HOURS SCHEDULED
Status: DISCONTINUED | OUTPATIENT
Start: 2017-12-05 | End: 2017-12-11 | Stop reason: HOSPADM

## 2017-12-05 RX ORDER — ALPRAZOLAM 0.5 MG/1
0.5 TABLET ORAL 4 TIMES DAILY
Status: DISCONTINUED | OUTPATIENT
Start: 2017-12-05 | End: 2017-12-11 | Stop reason: HOSPADM

## 2017-12-05 RX ORDER — IPRATROPIUM BROMIDE AND ALBUTEROL SULFATE 2.5; .5 MG/3ML; MG/3ML
3 SOLUTION RESPIRATORY (INHALATION) ONCE
Status: COMPLETED | OUTPATIENT
Start: 2017-12-05 | End: 2017-12-05

## 2017-12-05 RX ORDER — ROPINIROLE 1 MG/1
1 TABLET, FILM COATED ORAL NIGHTLY
Status: DISCONTINUED | OUTPATIENT
Start: 2017-12-05 | End: 2017-12-11 | Stop reason: HOSPADM

## 2017-12-05 RX ORDER — PROPRANOLOL HYDROCHLORIDE 10 MG/1
40 TABLET ORAL ONCE
Status: COMPLETED | OUTPATIENT
Start: 2017-12-05 | End: 2017-12-05

## 2017-12-05 RX ORDER — ONDANSETRON 4 MG/1
4 TABLET, FILM COATED ORAL EVERY 8 HOURS PRN
Status: DISCONTINUED | OUTPATIENT
Start: 2017-12-05 | End: 2017-12-11 | Stop reason: HOSPADM

## 2017-12-05 RX ORDER — MIRTAZAPINE 15 MG/1
7.5 TABLET, FILM COATED ORAL NIGHTLY
Status: DISCONTINUED | OUTPATIENT
Start: 2017-12-05 | End: 2017-12-11 | Stop reason: HOSPADM

## 2017-12-05 RX ORDER — PANTOPRAZOLE SODIUM 40 MG/1
40 TABLET, DELAYED RELEASE ORAL EVERY MORNING
Status: DISCONTINUED | OUTPATIENT
Start: 2017-12-05 | End: 2017-12-11 | Stop reason: HOSPADM

## 2017-12-05 RX ORDER — QUETIAPINE FUMARATE 300 MG/1
300 TABLET, FILM COATED ORAL NIGHTLY
Status: CANCELLED | OUTPATIENT
Start: 2017-12-05

## 2017-12-05 RX ORDER — L.ACID,CASEI/B.ANIMAL/S.THERMO 16B CELL
1 CAPSULE ORAL DAILY
Status: DISCONTINUED | OUTPATIENT
Start: 2017-12-05 | End: 2017-12-11 | Stop reason: HOSPADM

## 2017-12-05 RX ORDER — BUMETANIDE 1 MG/1
1 TABLET ORAL DAILY
Status: DISCONTINUED | OUTPATIENT
Start: 2017-12-06 | End: 2017-12-11 | Stop reason: HOSPADM

## 2017-12-05 RX ORDER — CETIRIZINE HYDROCHLORIDE 10 MG/1
10 TABLET ORAL DAILY
Status: DISCONTINUED | OUTPATIENT
Start: 2017-12-05 | End: 2017-12-11 | Stop reason: HOSPADM

## 2017-12-05 RX ORDER — TRIAMCINOLONE ACETONIDE 1 MG/G
1 CREAM TOPICAL AS NEEDED
Status: CANCELLED | OUTPATIENT
Start: 2017-12-05

## 2017-12-05 RX ORDER — FAMOTIDINE 20 MG/1
20 TABLET, FILM COATED ORAL DAILY
Status: CANCELLED | OUTPATIENT
Start: 2017-12-05

## 2017-12-05 RX ORDER — LEVOTHYROXINE SODIUM 0.12 MG/1
125 TABLET ORAL
Status: DISCONTINUED | OUTPATIENT
Start: 2017-12-05 | End: 2017-12-11 | Stop reason: HOSPADM

## 2017-12-05 RX ORDER — ACETAMINOPHEN 500 MG
1000 TABLET ORAL ONCE
Status: COMPLETED | OUTPATIENT
Start: 2017-12-05 | End: 2017-12-05

## 2017-12-05 RX ORDER — METOPROLOL TARTRATE 50 MG/1
50 TABLET, FILM COATED ORAL EVERY OTHER DAY
Status: ON HOLD | COMMUNITY
End: 2018-05-16

## 2017-12-05 RX ORDER — ASPIRIN 81 MG/1
81 TABLET ORAL DAILY
Status: DISCONTINUED | OUTPATIENT
Start: 2017-12-05 | End: 2017-12-11 | Stop reason: HOSPADM

## 2017-12-05 RX ORDER — QUETIAPINE FUMARATE 300 MG/1
300 TABLET, FILM COATED ORAL NIGHTLY
Status: DISCONTINUED | OUTPATIENT
Start: 2017-12-05 | End: 2017-12-11 | Stop reason: HOSPADM

## 2017-12-05 RX ORDER — ASPIRIN 81 MG/1
81 TABLET ORAL DAILY
COMMUNITY

## 2017-12-05 RX ADMIN — CEFTRIAXONE 1 G: 1 INJECTION, POWDER, FOR SOLUTION INTRAMUSCULAR; INTRAVENOUS at 00:55

## 2017-12-05 RX ADMIN — ALPRAZOLAM 0.5 MG: 0.5 TABLET ORAL at 17:14

## 2017-12-05 RX ADMIN — ACETAMINOPHEN 1000 MG: 500 TABLET ORAL at 00:49

## 2017-12-05 RX ADMIN — OXYCODONE HYDROCHLORIDE AND ACETAMINOPHEN 1 TABLET: 10; 325 TABLET ORAL at 11:50

## 2017-12-05 RX ADMIN — AMITRIPTYLINE HYDROCHLORIDE 100 MG: 50 TABLET, FILM COATED ORAL at 19:54

## 2017-12-05 RX ADMIN — OXYCODONE HYDROCHLORIDE AND ACETAMINOPHEN 1 TABLET: 10; 325 TABLET ORAL at 19:53

## 2017-12-05 RX ADMIN — METOPROLOL TARTRATE 50 MG: 50 TABLET, FILM COATED ORAL at 19:53

## 2017-12-05 RX ADMIN — QUETIAPINE FUMARATE 300 MG: 300 TABLET, FILM COATED ORAL at 19:54

## 2017-12-05 RX ADMIN — SODIUM CHLORIDE 1000 ML: 9 INJECTION, SOLUTION INTRAVENOUS at 00:50

## 2017-12-05 RX ADMIN — ROPINIROLE HYDROCHLORIDE 1 MG: 1 TABLET, FILM COATED ORAL at 19:53

## 2017-12-05 RX ADMIN — MIRTAZAPINE 7.5 MG: 15 TABLET, FILM COATED ORAL at 19:53

## 2017-12-05 RX ADMIN — ALPRAZOLAM 0.5 MG: 0.5 TABLET ORAL at 19:53

## 2017-12-05 RX ADMIN — PIPERACILLIN SODIUM,TAZOBACTAM SODIUM 3.38 G: 3; .375 INJECTION, POWDER, FOR SOLUTION INTRAVENOUS at 06:25

## 2017-12-05 RX ADMIN — PIPERACILLIN SODIUM,TAZOBACTAM SODIUM 3.38 G: 3; .375 INJECTION, POWDER, FOR SOLUTION INTRAVENOUS at 22:40

## 2017-12-05 RX ADMIN — METOPROLOL TARTRATE 50 MG: 50 TABLET, FILM COATED ORAL at 11:47

## 2017-12-05 RX ADMIN — ALPRAZOLAM 0.5 MG: 0.5 TABLET ORAL at 11:47

## 2017-12-05 RX ADMIN — ASPIRIN 81 MG: 81 TABLET ORAL at 11:47

## 2017-12-05 RX ADMIN — IPRATROPIUM BROMIDE AND ALBUTEROL SULFATE 3 ML: .5; 3 SOLUTION RESPIRATORY (INHALATION) at 00:56

## 2017-12-05 RX ADMIN — QUETIAPINE FUMARATE 300 MG: 300 TABLET, FILM COATED ORAL at 02:45

## 2017-12-05 RX ADMIN — PROPRANOLOL HYDROCHLORIDE 40 MG: 10 TABLET ORAL at 01:14

## 2017-12-05 RX ADMIN — CETIRIZINE HYDROCHLORIDE 10 MG: 10 TABLET ORAL at 11:47

## 2017-12-05 RX ADMIN — Medication 1 CAPSULE: at 17:14

## 2017-12-05 RX ADMIN — LEVOTHYROXINE SODIUM 125 MCG: 0.12 TABLET ORAL at 11:47

## 2017-12-05 RX ADMIN — CEFTRIAXONE 1 G: 1 INJECTION, POWDER, FOR SOLUTION INTRAMUSCULAR; INTRAVENOUS at 22:07

## 2017-12-05 RX ADMIN — PANTOPRAZOLE SODIUM 40 MG: 40 TABLET, DELAYED RELEASE ORAL at 11:47

## 2017-12-05 NOTE — H&P
History and Physical  12/05/17    Chief complaint cough    Subjective     Patient is a 72 y.o. female presents with increasing cough for past 7 days been on zpak with some help.  Was brought to ER while in ER WC 13,000 with lactic acid 2.2 was told had sepsis.  Pt afebrile no respiratory distress, no chest pains, no dyspnea from baseline.  No edema.      Review of Systems   Pertinent items are noted in HPI, all other systems reviewed and negative    History  Past Medical History:   Diagnosis Date   • Anxiety    • Coronary artery disease    • Degenerative disorder of bone    • Depression    • Disease of thyroid gland    • Fall 09/2017   • Fibromyalgia    • GERD (gastroesophageal reflux disease)    • Hyperlipidemia    • Hypertension    • Injury of back    • Migraine      Past Surgical History:   Procedure Laterality Date   • APPENDECTOMY     • CARDIAC CATHETERIZATION     • CARDIAC CATHETERIZATION     • CHOLECYSTECTOMY     • ENDOSCOPY N/A 7/5/2016    Procedure: ESOPHAGOGASTRODUODENOSCOPY;  Surgeon: Ziggy Sprague III, MD;  Location: [facility] OR;  Service:    • ENDOSCOPY N/A 7/5/2016    Procedure: ESOPHAGOGASTRODUODENOSCOPY WITH DILATATION;  Surgeon: Ziggy Sprague III, MD;  Location: [facility] OR;  Service:    • HYSTERECTOMY       Family History   Problem Relation Age of Onset   • Cancer Mother      Pancreatic cancer   • Cancer Father    • Heart disease Brother      Social History   Substance Use Topics   • Smoking status: Never Smoker   • Smokeless tobacco: Never Used   • Alcohol use No     Prescriptions Prior to Admission   Medication Sig Dispense Refill Last Dose   • ALPRAZolam (XANAX) 0.5 MG tablet Take 0.5 mg by mouth 4 (Four) Times a Day.   9/28/2017 at Unknown time   • amitriptyline (ELAVIL) 100 MG tablet Take 1 tablet by mouth Every Night. 30 tablet 2 09/27/2017   • aspirin 81 MG EC tablet Take 81 mg by mouth daily.   9/28/2017 at 0600   • atorvastatin (LIPITOR) 20 MG tablet Take 1 tablet by mouth Every  Night. 30 tablet 1    • bumetanide (BUMEX) 1 MG tablet Take 1 mg by mouth Daily.      • furosemide (LASIX) 40 MG tablet Take 40 mg by mouth Daily.  5 9/28/2017 at 0600   • levothyroxine (SYNTHROID, LEVOTHROID) 125 MCG tablet Take 125 mcg by mouth daily.   9/28/2017 at 0600   • loratadine (CLARITIN) 10 MG tablet Take 10 mg by mouth Daily.   9/28/2017 at 0600   • mirtazapine (REMERON) 7.5 MG tablet Take 1 tablet by mouth Every Night. (Patient taking differently: Take 7.5 mg by mouth At Night As Needed (muscle spasms).) 30 tablet 2 Unknown at Unknown time   • oxyCODONE-acetaminophen (PERCOCET)  MG per tablet Take 1 tablet by mouth Every 6 (Six) Hours.   9/28/2017 at Unknown time   • pantoprazole (PROTONIX) 40 MG EC tablet Take 40 mg by mouth Daily.  3 9/28/2017 at 0600   • propranolol (INDERAL) 40 MG tablet Take 40 mg by mouth 2 (Two) Times a Day.   9/28/2017 at 0600   • QUEtiapine (SEROquel) 300 MG tablet Take 1 tablet by mouth Every Night. 30 tablet 2 09/27/2017   • raNITIdine (ZANTAC) 150 MG tablet Take 150 mg by mouth Daily As Needed for Heartburn.  3 Unknown at Unknown time   • rOPINIRole (REQUIP) 1 MG tablet Take 1 mg by mouth Every Night.  5 09/27/2017   • triamcinolone (KENALOG) 0.1 % cream Apply 1 application topically As Needed for Irritation (under breasts).   Unknown at Unknown time     Allergies:  Procaine      Objective     Vital Signs  Temp:  [99.1 °F (37.3 °C)-99.3 °F (37.4 °C)] 99.1 °F (37.3 °C)  Heart Rate:  [] 104  Resp:  [18-20] 20  BP: (103-150)/(68-92) 144/71    Physical Exam:   General Appearance alert, appears stated age and cooperative   Head normocephalic, without obvious abnormality and atraumatic   Eyes conjunctivae and sclerae normal, no icterus and PERRLA   Neck no adenopathy, suppple, no thyromegaly, no carotid bruit and no JVD   Lungs scattered rhonchi   Heart tachycardia   Abdomen normal bowel sounds   Extremities no edema    Labs:  Lab Results (last 72 hours)     Procedure  Component Value Units Date/Time    Influenza Antigen, Rapid - Swab, Nasopharynx [[id number]]  (Normal) Collected:  12/04/17 2302    Specimen:  Swab from Nasopharynx Updated:  12/04/17 2318     Influenza A Ag, EIA Negative     Influenza B Ag, EIA Negative    Urine Culture - Urine, Urine, Clean Catch [[id number]] Collected:  12/04/17 2325    Specimen:  Urine from Urine, Clean Catch Updated:  12/04/17 2334    Urinalysis With / Culture If Indicated - Urine, Clean Catch [[id number]]  (Abnormal) Collected:  12/04/17 2325    Specimen:  Urine from Urine, Clean Catch Updated:  12/04/17 2336     Color, UA Yellow     Appearance, UA Cloudy (A)     pH, UA 5.5     Specific Gravity, UA 1.014     Glucose, UA Negative     Ketones, UA Negative     Bilirubin, UA Negative     Blood, UA Small (1+) (A)     Protein, UA Negative     Leuk Esterase, UA Large (3+) (A)     Nitrite, UA Negative     Urobilinogen, UA 0.2 E.U./dL    Urinalysis, Microscopic Only - Urine, Clean Catch [[id number]]  (Abnormal) Collected:  12/04/17 2325    Specimen:  Urine from Urine, Clean Catch Updated:  12/04/17 2336     RBC, UA 3-6 (A) /HPF      WBC, UA Too Numerous to Count (A) /HPF      Bacteria, UA 4+ (A) /HPF      Squamous Epithelial Cells, UA None Seen /HPF      Hyaline Casts, UA None Seen /LPF      Methodology Automated Microscopy    CBC & Differential [[id number]] Collected:  12/04/17 2325    Specimen:  Blood Updated:  12/04/17 2338    Narrative:       The following orders were created for panel order CBC & Differential.  Procedure                               Abnormality         Status                     ---------                               -----------         ------                     CBC Auto Differential[[id number]]        Abnormal            Final result                 Please view results for these tests on the individual orders.    CBC Auto Differential [[id number]]  (Abnormal) Collected:  12/04/17 2325    Specimen:  Blood Updated:  12/04/17 2338      WBC 13.52 (H) 10*3/mm3      RBC 4.26 10*6/mm3      Hemoglobin 11.6 (L) g/dL      Hematocrit 36.4 (L) %      MCV 85.4 fL      MCH 27.2 pg      MCHC 31.9 (L) g/dL      RDW 14.6 (H) %      RDW-SD 44.4 fl      MPV 11.9 (H) fL      Platelets 160 10*3/mm3      Neutrophil % 66.4 %      Lymphocyte % 21.3 %      Monocyte % 7.9 %      Eosinophil % 3.8 %      Basophil % 0.2 %      Immature Grans % 0.4 %      Neutrophils, Absolute 8.97 (H) 10*3/mm3      Lymphocytes, Absolute 2.88 10*3/mm3      Monocytes, Absolute 1.07 (H) 10*3/mm3      Eosinophils, Absolute 0.51 10*3/mm3      Basophils, Absolute 0.03 10*3/mm3      Immature Grans, Absolute 0.06 (H) 10*3/mm3     Comprehensive Metabolic Panel [[id number]]  (Abnormal) Collected:  12/04/17 2325    Specimen:  Blood Updated:  12/05/17 0034     Glucose 120 (H) mg/dL      BUN 11 mg/dL      Creatinine 0.81 mg/dL      Sodium 138 mmol/L      Potassium 3.5 mmol/L      Chloride 100 mmol/L      CO2 29.5 mmol/L      Calcium 10.0 mg/dL      Total Protein 7.5 g/dL      Albumin 4.40 g/dL      ALT (SGPT) 53 (H) U/L      AST (SGOT) 43 (H) U/L      Alkaline Phosphatase 113 (H) U/L       Note New Reference Ranges        Total Bilirubin 0.4 mg/dL      eGFR Non African Amer 70 mL/min/1.73      Globulin 3.1 gm/dL      A/G Ratio 1.4 (L) g/dL      BUN/Creatinine Ratio 13.6     Anion Gap 8.5 mmol/L     Narrative:       The MDRD GFR formula is only valid for adults with stable renal function between ages 18 and 70.    Osmolality, Calculated [[id number]]  (Normal) Collected:  12/04/17 2325    Specimen:  Blood Updated:  12/05/17 0034     Osmolality Calc 276.3 mOsm/kg     Blood Culture - Blood, [[id number]] Collected:  12/05/17 0051    Specimen:  Blood from Arm, Left Updated:  12/05/17 0055    Lactic Acid, Plasma [[id number]]  (Abnormal) Collected:  12/05/17 0051    Specimen:  Blood Updated:  12/05/17 0122     Lactate 2.2 (C) mmol/L     Blood Culture - Blood, [[id number]] Collected:  12/05/17 0141    Specimen:   Blood from Arm, Left Updated:  12/05/17 0144    BNP [[id number]]  (Abnormal) Collected:  12/05/17 0131    Specimen:  Blood Updated:  12/05/17 0202     .0 (H) pg/mL     Lactic Acid, Reflex Timer [[id number]] Collected:  12/05/17 0051    Specimen:  Blood Updated:  12/05/17 0431     Extra Tube Hold for add-ons.      Auto resulted.       Lactic Acid, Reflex [[id number]]  (Abnormal) Collected:  12/05/17 0505    Specimen:  Blood Updated:  12/05/17 0548     Lactate 3.2 (C) mmol/L           Xray:  Imaging Results (last 24 hours)     Procedure Component Value Units Date/Time    XR Chest 2 View [[id number]] Resulted:  12/04/17 2320     Updated:  12/04/17 2320          Results Review:    I reviewed the patient's new clinical results.    Assessment/Plan    1.Sepsis with Bronchitis and UTI: admit iv abx and supportative care.  2.HTN: monitor  3.CAD: Recent Stress test normal    Active Problems:    Sepsis        Madi Tidwell MD  12/05/17  5:48 AM

## 2017-12-05 NOTE — ED PROVIDER NOTES
Subjective   HPI Comments: Patient presents to the ED with worsening productive cough, congestion, sore throat, and feeling SOB.  Patient stated that she was seen on Friday at PCP and started on Z pack, does not feel any better. Patient states that her sputum is green. Patient states she feels like it is in in her lungs.  She denies any chest pain, N/V/D, or fevers.     Patient is a 72 y.o. female presenting with URI.   History provided by:  Patient   used: No    URI   Presenting symptoms: congestion, cough and sore throat    Severity:  Mild  Onset quality:  Sudden  Duration:  3 days  Timing:  Constant  Progression:  Worsening  Chronicity:  New  Relieved by:  Nothing  Worsened by:  Nothing  Ineffective treatments:  OTC medications and prescription medications (z pack )  Associated symptoms: wheezing        Review of Systems   Constitutional: Negative.    HENT: Positive for congestion and sore throat.    Eyes: Negative.    Respiratory: Positive for cough and wheezing.    Cardiovascular: Negative.    Gastrointestinal: Negative.    Endocrine: Negative.    Genitourinary: Negative.    Musculoskeletal: Negative.    Skin: Negative.    Allergic/Immunologic: Negative.    Neurological: Negative.    Hematological: Negative.    Psychiatric/Behavioral: Negative.    All other systems reviewed and are negative.      Past Medical History:   Diagnosis Date   • Anxiety    • Coronary artery disease    • Degenerative disorder of bone    • Depression    • Disease of thyroid gland    • Fall 09/2017   • Fibromyalgia    • GERD (gastroesophageal reflux disease)    • Hyperlipidemia    • Hypertension    • Injury of back    • Migraine        Allergies   Allergen Reactions   • Procaine        Past Surgical History:   Procedure Laterality Date   • APPENDECTOMY     • CARDIAC CATHETERIZATION     • CARDIAC CATHETERIZATION     • CHOLECYSTECTOMY     • ENDOSCOPY N/A 7/5/2016    Procedure: ESOPHAGOGASTRODUODENOSCOPY;  Surgeon:  Ziggy Sprague III, MD;  Location: [facility] OR;  Service:    • ENDOSCOPY N/A 7/5/2016    Procedure: ESOPHAGOGASTRODUODENOSCOPY WITH DILATATION;  Surgeon: Ziggy Sprague III, MD;  Location: [facility] OR;  Service:    • HYSTERECTOMY         Family History   Problem Relation Age of Onset   • Cancer Mother      Pancreatic cancer   • Cancer Father    • Heart disease Brother        Social History     Social History   • Marital status: Single     Spouse name: N/A   • Number of children: N/A   • Years of education: N/A     Social History Main Topics   • Smoking status: Never Smoker   • Smokeless tobacco: Never Used   • Alcohol use No   • Drug use: No   • Sexual activity: Defer     Other Topics Concern   • None     Social History Narrative           Objective   Physical Exam   Constitutional: She is oriented to person, place, and time. She appears well-developed and well-nourished.   HENT:   Head: Normocephalic and atraumatic.   Right Ear: External ear normal.   Left Ear: External ear normal.   Nose: Nose normal.   Mouth/Throat: Oropharynx is clear and moist.   Eyes: Conjunctivae and EOM are normal. Pupils are equal, round, and reactive to light.   Neck: Normal range of motion. Neck supple.   Cardiovascular: Normal rate, regular rhythm, normal heart sounds and intact distal pulses.    Pulmonary/Chest: Effort normal and breath sounds normal.   cough   Abdominal: Soft. Bowel sounds are normal.   Musculoskeletal: Normal range of motion.   Neurological: She is alert and oriented to person, place, and time.   Skin: Skin is warm and dry.   Nursing note and vitals reviewed.      Procedures         ED Course  ED Course   Value Comment By Time   ECG 12 Lead 2253- Reviewed by Dr. Ruiz, rate 111, Sinus tachy no ischemia FINA Cobos 12/05 0003    Patient continues to get up out of bed and walk the halls, rising her HR. Patient instructed numerous times to stay in bed. FINA Cobos 12/05 0047     Patient states she has not had night time meds FINA Cobos 12/05 0110   ECG 12 Lead Repeat EKG 0108- Rate 120, Sinus tachy, no ischemia per Z FINA Cobos 12/05 0111    Will hold sepsis bolus due to CHF FINA Cobos 12/05 0129    DR. PARKER WILL ADMIT TO TELE FINA Cobos 12/05 0130                  [facility]    Final diagnoses:   Sepsis, due to unspecified organism   Urinary tract infection with hematuria, site unspecified            FINA Cobos  12/05/17 0208

## 2017-12-05 NOTE — PLAN OF CARE
Problem: Patient Care Overview (Adult)  Goal: Plan of Care Review  Outcome: Ongoing (interventions implemented as appropriate)    Problem: Sepsis (Adult)  Goal: Signs and Symptoms of Listed Potential Problems Will be Absent or Manageable (Sepsis)  Outcome: Ongoing (interventions implemented as appropriate)    Problem: Fall Risk (Adult)  Goal: Identify Related Risk Factors and Signs and Symptoms  Outcome: Ongoing (interventions implemented as appropriate)  Goal: Absence of Falls  Outcome: Ongoing (interventions implemented as appropriate)    Problem: Infection, Risk/Actual (Adult)  Goal: Identify Related Risk Factors and Signs and Symptoms  Outcome: Ongoing (interventions implemented as appropriate)  Goal: Infection Prevention/Resolution  Outcome: Ongoing (interventions implemented as appropriate)

## 2017-12-05 NOTE — PROGRESS NOTES
Discharge Planning Assessment   [place]     Patient Name: Indigo Sorenson  MRN: [id number]  Today's Date: 12/5/2017    Admit Date: 12/4/2017          Discharge Needs Assessment       12/05/17 1154    Living Environment    Lives With alone    Living Arrangements assisted living    Type of Financial/Environmental Concern none    Transportation Available car   Her 2 sons, Arnold and Hoang provide transportation and will provide discharge transportation.    Living Environment    Provides Primary Care For no one    Primary Care Provided By child(michele) (specify)   Sons, Arnold and Hoang.    Quality Of Family Relationships supportive    Able to Return to Prior Living Arrangements yes    Discharge Needs Assessment    Concerns To Be Addressed no discharge needs identified   States she has no needs. Lives in Assisted Living and her sons are very suportive.    Readmission Within The Last 30 Days no previous admission in last 30 days    Outpatient/Agency/Support Group Needs --   Denies outpatient needs.    Equipment Currently Used at Home none    Equipment Needed After Discharge none   Denies equipment needs.    Discharge Disposition still a patient            Discharge Plan       12/05/17 1157    Case Management/Social Work Plan    Plan Plans to return to [facility] at discharge with support of her two sons.    Patient/Family In Agreement With Plan yes    Additional Comments Admitted from ED with Dx: Sepsis, UTI and Bronchitis. Receiving IV ATB's. Continues to c/o cough. Staff assisting with her ambulation. Currently alert and oriented. Pleasant. No CM needs currently. Will follow and assist as needed.        Discharge Placement     No information found                Demographic Summary       12/05/17 1151    Referral Information    Admission Type inpatient    Arrived From admitted as an inpatient;home or self-care    Referral Source admission list    Reason For Consult discharge planning    Record Reviewed  medical record    Primary Care Physician Information    Name Dr. Madi Tidwell-PCP.            Functional Status       12/05/17 1151    Functional Status Current    Ambulation 2-->assistive person    Transferring 2-->assistive person    Toileting 2-->assistive person    Bathing 2-->assistive person    Dressing 2-->assistive person    Eating 0-->independent    Communication 0-->understands/communicates without difficulty    Swallowing (if score 2 or more for any item, consult Rehab Services) 0-->swallows foods/liquids without difficulty    Current Functional Level Comment Staff assisting with ADL's at this time. She is usually independent at home.    Change in Functional Status Since Onset of Current Illness/Injury yes    IADL    IADL Comments Her sons, Hoang and Arnold assist with IADL's.    Activity Tolerance    Usual Activity Tolerance moderate    Current Activity Tolerance moderate    Cognitive/Perceptual/Developmental    Current Mental Status/Cognitive Functioning --   Currently alert and oriented. States she has had a fall in the past that resulted in brain injury and she is forgetful at times.    Recent Changes in Mental Status/Cognitive Functioning no changes    Employment/Financial    Financial Concerns none   Denies medication issues. She has Medicare and [employer] coverage.            Psychosocial     None            Abuse/Neglect     None            Legal       12/05/17 1201    Legal    Assistance with Managing/Advocating Healthcare Needs --   States she has a Living Will on hospital file. Declines information on a POA.            Substance Abuse     None            Patient Forms     None          Sharri Drew RN

## 2017-12-05 NOTE — PROGRESS NOTES
Pharmacy was consulted to dose zosyn for a UTI. Based on an estimated CrCl of 68mL/min, an extended infusion dose of 3.375g q8hr has been ordered. Thank you for the consult.     Thank you,   Liberty Mcgee, [facility]  6:00 AM

## 2017-12-05 NOTE — PROGRESS NOTES
Day 2 zosyn and rocephin for the treatment of bronchitis and UTI. Please evaluate if therapy can be de-escalated to rocephin monotherapy.  Thank You,  Venita CoronadoD

## 2017-12-05 NOTE — PLAN OF CARE
Problem: Patient Care Overview (Adult)  Goal: Plan of Care Review  Outcome: Ongoing (interventions implemented as appropriate)  Goal: Adult Individualization and Mutuality  Outcome: Ongoing (interventions implemented as appropriate)  Goal: Discharge Needs Assessment  Outcome: Ongoing (interventions implemented as appropriate)    Problem: Sepsis (Adult)  Goal: Signs and Symptoms of Listed Potential Problems Will be Absent or Manageable (Sepsis)  Outcome: Ongoing (interventions implemented as appropriate)    Problem: Fall Risk (Adult)  Goal: Identify Related Risk Factors and Signs and Symptoms  Outcome: Ongoing (interventions implemented as appropriate)  Goal: Absence of Falls  Outcome: Ongoing (interventions implemented as appropriate)    Problem: Infection, Risk/Actual (Adult)  Goal: Identify Related Risk Factors and Signs and Symptoms  Outcome: Ongoing (interventions implemented as appropriate)  Goal: Infection Prevention/Resolution  Outcome: Ongoing (interventions implemented as appropriate)

## 2017-12-06 PROCEDURE — 25010000002 CEFTRIAXONE: Performed by: FAMILY MEDICINE

## 2017-12-06 PROCEDURE — 94799 UNLISTED PULMONARY SVC/PX: CPT

## 2017-12-06 RX ADMIN — Medication 1 CAPSULE: at 08:47

## 2017-12-06 RX ADMIN — CETIRIZINE HYDROCHLORIDE 10 MG: 10 TABLET ORAL at 08:47

## 2017-12-06 RX ADMIN — LEVOTHYROXINE SODIUM 125 MCG: 0.12 TABLET ORAL at 06:10

## 2017-12-06 RX ADMIN — OXYCODONE HYDROCHLORIDE AND ACETAMINOPHEN 1 TABLET: 10; 325 TABLET ORAL at 06:10

## 2017-12-06 RX ADMIN — OXYCODONE HYDROCHLORIDE AND ACETAMINOPHEN 1 TABLET: 10; 325 TABLET ORAL at 18:04

## 2017-12-06 RX ADMIN — ROPINIROLE HYDROCHLORIDE 1 MG: 1 TABLET, FILM COATED ORAL at 20:12

## 2017-12-06 RX ADMIN — METOPROLOL TARTRATE 50 MG: 50 TABLET, FILM COATED ORAL at 20:11

## 2017-12-06 RX ADMIN — ALPRAZOLAM 0.5 MG: 0.5 TABLET ORAL at 18:04

## 2017-12-06 RX ADMIN — BUMETANIDE 1 MG: 1 TABLET ORAL at 08:47

## 2017-12-06 RX ADMIN — ASPIRIN 81 MG: 81 TABLET ORAL at 08:47

## 2017-12-06 RX ADMIN — ALPRAZOLAM 0.5 MG: 0.5 TABLET ORAL at 11:53

## 2017-12-06 RX ADMIN — PANTOPRAZOLE SODIUM 40 MG: 40 TABLET, DELAYED RELEASE ORAL at 06:10

## 2017-12-06 RX ADMIN — OXYCODONE HYDROCHLORIDE AND ACETAMINOPHEN 1 TABLET: 10; 325 TABLET ORAL at 11:52

## 2017-12-06 RX ADMIN — ALPRAZOLAM 0.5 MG: 0.5 TABLET ORAL at 20:11

## 2017-12-06 RX ADMIN — QUETIAPINE FUMARATE 300 MG: 300 TABLET, FILM COATED ORAL at 20:11

## 2017-12-06 RX ADMIN — METOPROLOL TARTRATE 50 MG: 50 TABLET, FILM COATED ORAL at 08:47

## 2017-12-06 RX ADMIN — MIRTAZAPINE 7.5 MG: 15 TABLET, FILM COATED ORAL at 20:12

## 2017-12-06 RX ADMIN — CEFTRIAXONE 1 G: 1 INJECTION, POWDER, FOR SOLUTION INTRAMUSCULAR; INTRAVENOUS at 22:52

## 2017-12-06 RX ADMIN — AMITRIPTYLINE HYDROCHLORIDE 100 MG: 50 TABLET, FILM COATED ORAL at 20:11

## 2017-12-06 RX ADMIN — ALPRAZOLAM 0.5 MG: 0.5 TABLET ORAL at 08:47

## 2017-12-06 NOTE — PLAN OF CARE
Problem: Sepsis (Adult)  Goal: Signs and Symptoms of Listed Potential Problems Will be Absent or Manageable (Sepsis)  Outcome: Ongoing (interventions implemented as appropriate)    12/05/17 1115   Sepsis   Problems Assessed (Sepsis) all   Problems Present (Sepsis) none         Problem: Fall Risk (Adult)  Goal: Identify Related Risk Factors and Signs and Symptoms  Outcome: Ongoing (interventions implemented as appropriate)    12/05/17 1115   Fall Risk   Fall Risk: Related Risk Factors age-related changes;gait/mobility problems   Fall Risk: Signs and Symptoms presence of risk factors       Goal: Absence of Falls  Outcome: Ongoing (interventions implemented as appropriate)    12/05/17 2125   Fall Risk (Adult)   Absence of Falls making progress toward outcome         Problem: Infection, Risk/Actual (Adult)  Goal: Identify Related Risk Factors and Signs and Symptoms  Outcome: Ongoing (interventions implemented as appropriate)    12/05/17 1115   Infection, Risk/Actual   Signs and Symptoms (Infection, Risk/Actual) weakness       Goal: Infection Prevention/Resolution  Outcome: Ongoing (interventions implemented as appropriate)    12/05/17 2125   Infection, Risk/Actual (Adult)   Infection Prevention/Resolution making progress toward outcome

## 2017-12-06 NOTE — PROGRESS NOTES
Progress Note   12/06/17      Subjective     Interval History:     Complaints: Feeling some better but still with cough and congestion , no fevers, no chills, No n/v/d.  +Pleuritic chest discomfort.        Objective     Intake & Output (last day)       12/05 0701 - 12/06 0700    P.O. 840    IV Piggyback 100    Total Intake(mL/kg) 940 (10.3)    Net +940         Unmeasured Urine Occurrence 4 x    Unmeasured Stool Occurrence 1 x          Vital Signs  Temp:  [97.6 °F (36.4 °C)-98.1 °F (36.7 °C)] 97.6 °F (36.4 °C)  Heart Rate:  [75-82] 82  Resp:  [16-20] 18  BP: (103-115)/(47-56) 115/56    Physical Exam:   General Appearance alert, appears stated age and cooperative   Lungs dec bases with scattered rhonchi   Heart regular rhythm & normal rate   Abdomen +BS   Extremities no edema    Labs:  Lab Results (last 72 hours)     Procedure Component Value Units Date/Time    Influenza Antigen, Rapid - Swab, Nasopharynx [[id number]]  (Normal) Collected:  12/04/17 2302    Specimen:  Swab from Nasopharynx Updated:  12/04/17 2318     Influenza A Ag, EIA Negative     Influenza B Ag, EIA Negative    Urine Culture - Urine, Urine, Clean Catch [[id number]] Collected:  12/04/17 2325    Specimen:  Urine from Urine, Clean Catch Updated:  12/04/17 2334    Urinalysis With / Culture If Indicated - Urine, Clean Catch [[id number]]  (Abnormal) Collected:  12/04/17 2325    Specimen:  Urine from Urine, Clean Catch Updated:  12/04/17 2336     Color, UA Yellow     Appearance, UA Cloudy (A)     pH, UA 5.5     Specific Gravity, UA 1.014     Glucose, UA Negative     Ketones, UA Negative     Bilirubin, UA Negative     Blood, UA Small (1+) (A)     Protein, UA Negative     Leuk Esterase, UA Large (3+) (A)     Nitrite, UA Negative     Urobilinogen, UA 0.2 E.U./dL    Urinalysis, Microscopic Only - Urine, Clean Catch [[id number]]  (Abnormal) Collected:  12/04/17 2325    Specimen:  Urine from Urine, Clean Catch Updated:  12/04/17 2336     RBC, UA 3-6 (A) /HPF       WBC, UA Too Numerous to Count (A) /HPF      Bacteria, UA 4+ (A) /HPF      Squamous Epithelial Cells, UA None Seen /HPF      Hyaline Casts, UA None Seen /LPF      Methodology Automated Microscopy    CBC & Differential [[id number]] Collected:  12/04/17 2325    Specimen:  Blood Updated:  12/04/17 2338    Narrative:       The following orders were created for panel order CBC & Differential.  Procedure                               Abnormality         Status                     ---------                               -----------         ------                     CBC Auto Differential[[id number]]        Abnormal            Final result                 Please view results for these tests on the individual orders.    CBC Auto Differential [[id number]]  (Abnormal) Collected:  12/04/17 2325    Specimen:  Blood Updated:  12/04/17 2338     WBC 13.52 (H) 10*3/mm3      RBC 4.26 10*6/mm3      Hemoglobin 11.6 (L) g/dL      Hematocrit 36.4 (L) %      MCV 85.4 fL      MCH 27.2 pg      MCHC 31.9 (L) g/dL      RDW 14.6 (H) %      RDW-SD 44.4 fl      MPV 11.9 (H) fL      Platelets 160 10*3/mm3      Neutrophil % 66.4 %      Lymphocyte % 21.3 %      Monocyte % 7.9 %      Eosinophil % 3.8 %      Basophil % 0.2 %      Immature Grans % 0.4 %      Neutrophils, Absolute 8.97 (H) 10*3/mm3      Lymphocytes, Absolute 2.88 10*3/mm3      Monocytes, Absolute 1.07 (H) 10*3/mm3      Eosinophils, Absolute 0.51 10*3/mm3      Basophils, Absolute 0.03 10*3/mm3      Immature Grans, Absolute 0.06 (H) 10*3/mm3     Comprehensive Metabolic Panel [[id number]]  (Abnormal) Collected:  12/04/17 2325    Specimen:  Blood Updated:  12/05/17 0034     Glucose 120 (H) mg/dL      BUN 11 mg/dL      Creatinine 0.81 mg/dL      Sodium 138 mmol/L      Potassium 3.5 mmol/L      Chloride 100 mmol/L      CO2 29.5 mmol/L      Calcium 10.0 mg/dL      Total Protein 7.5 g/dL      Albumin 4.40 g/dL      ALT (SGPT) 53 (H) U/L      AST (SGOT) 43 (H) U/L      Alkaline Phosphatase 113 (H)  U/L       Note New Reference Ranges        Total Bilirubin 0.4 mg/dL      eGFR Non African Amer 70 mL/min/1.73      Globulin 3.1 gm/dL      A/G Ratio 1.4 (L) g/dL      BUN/Creatinine Ratio 13.6     Anion Gap 8.5 mmol/L     Narrative:       The MDRD GFR formula is only valid for adults with stable renal function between ages 18 and 70.    Osmolality, Calculated [[id number]]  (Normal) Collected:  12/04/17 2325    Specimen:  Blood Updated:  12/05/17 0034     Osmolality Calc 276.3 mOsm/kg     Lactic Acid, Plasma [[id number]]  (Abnormal) Collected:  12/05/17 0051    Specimen:  Blood Updated:  12/05/17 0122     Lactate 2.2 (C) mmol/L     BNP [[id number]]  (Abnormal) Collected:  12/05/17 0131    Specimen:  Blood Updated:  12/05/17 0202     .0 (H) pg/mL     Lactic Acid, Reflex Timer [[id number]] Collected:  12/05/17 0051    Specimen:  Blood Updated:  12/05/17 0431     Extra Tube Hold for add-ons.      Auto resulted.       Lactic Acid, Reflex [[id number]]  (Abnormal) Collected:  12/05/17 0505    Specimen:  Blood Updated:  12/05/17 0548     Lactate 3.2 (C) mmol/L     Troponin [[id number]]  (Normal) Collected:  12/05/17 0650    Specimen:  Blood Updated:  12/05/17 0737     Troponin I 0.017 ng/mL     Narrative:       Ultra Troponin I Reference Range:         <=0.039 ng/mL: Negative    0.04-0.779 ng/mL: Indeterminate Range. Suspicious of MI.  Clinical correlation required.       >=0.78  ng/mL: Consistent with myocardial injury.  Clinical correlation required.    CK [[id number]]  (Abnormal) Collected:  12/05/17 0650    Specimen:  Blood Updated:  12/05/17 0740     Creatine Kinase 337 (H) U/L     CK-MB [[id number]]  (Normal) Collected:  12/05/17 0650    Specimen:  Blood Updated:  12/05/17 0740     CKMB 4.49 ng/mL     CK-MB Index [[id number]]  (Normal) Collected:  12/05/17 0650    Specimen:  Blood Updated:  12/05/17 0740     CK-MB Index 1.3 %     Troponin [[id number]]  (Normal) Collected:  12/05/17 1348    Specimen:  Blood Updated:   12/05/17 1432     Troponin I 0.010 ng/mL     Narrative:       Ultra Troponin I Reference Range:         <=0.039 ng/mL: Negative    0.04-0.779 ng/mL: Indeterminate Range. Suspicious of MI.  Clinical correlation required.       >=0.78  ng/mL: Consistent with myocardial injury.  Clinical correlation required.    Troponin [[id number]]  (Normal) Collected:  12/05/17 1849    Specimen:  Blood Updated:  12/05/17 1937     Troponin I 0.010 ng/mL     Narrative:       Ultra Troponin I Reference Range:         <=0.039 ng/mL: Negative    0.04-0.779 ng/mL: Indeterminate Range. Suspicious of MI.  Clinical correlation required.       >=0.78  ng/mL: Consistent with myocardial injury.  Clinical correlation required.    Blood Culture - Blood, [[id number]]  (Normal) Collected:  12/05/17 0051    Specimen:  Blood from Arm, Left Updated:  12/06/17 0101     Blood Culture No growth at 24 hours    Blood Culture - Blood, [[id number]]  (Normal) Collected:  12/05/17 0141    Specimen:  Blood from Arm, Left Updated:  12/06/17 0146     Blood Culture No growth at 24 hours          Xray:  Imaging Results (last 24 hours)     Procedure Component Value Units Date/Time    XR Chest 2 View [[id number]] Collected:  12/05/17 0734     Updated:  12/05/17 0736    Narrative:       EXAMINATION: XR CHEST 2 VW-      CLINICAL INDICATION:     SOB     TECHNIQUE:  XR CHEST 2 VW-      COMPARISON: NONE      FINDINGS:   The lungs remain aerated.  Heart and mediastinum contours are unremarkable.  No pleural effusion.  No pneumothorax.   Bony and soft tissue structures are unremarkable.       Impression:       No radiographic evidence of acute cardiac or pulmonary  disease.     This report was finalized on 12/5/2017 7:34 AM by Dr. Venkat Mccord MD.                Results Review:     I reviewed the patient's new clinical results.    Medication Review:   Hospital Medications (active)       Dose Frequency Start End    ALPRAZolam (XANAX) tablet 0.5 mg 0.5 mg 4 Times Daily 12/5/2017      Sig - Route: Take 1 tablet by mouth 4 (Four) Times a Day. - Oral    amitriptyline (ELAVIL) tablet 100 mg 100 mg Nightly 12/5/2017     Sig - Route: Take 2 tablets by mouth Every Night. - Oral    aspirin EC tablet 81 mg 81 mg Daily 12/5/2017     Sig - Route: Take 1 tablet by mouth Daily. - Oral    bumetanide (BUMEX) tablet 1 mg 1 mg Daily 12/6/2017     Sig - Route: Take 1 tablet by mouth Daily. - Oral    cefTRIAXone (ROCEPHIN) 1 g/100 mL 0.9% NS (MBP) 1 g Every 24 Hours 12/5/2017 12/12/2017    Sig - Route: Infuse 100 mL into a venous catheter Daily. - Intravenous    cetirizine (zyrTEC) tablet 10 mg 10 mg Daily 12/5/2017     Sig - Route: Take 1 tablet by mouth Daily. - Oral    levothyroxine (SYNTHROID, LEVOTHROID) tablet 125 mcg 125 mcg Every Early Morning 12/5/2017     Sig - Route: Take 1 tablet by mouth Every Morning. - Oral    metoprolol tartrate (LOPRESSOR) tablet 50 mg 50 mg Every 12 Hours Scheduled 12/5/2017     Sig - Route: Take 1 tablet by mouth Every 12 (Twelve) Hours. - Oral    mirtazapine (REMERON) tablet 7.5 mg 7.5 mg Nightly 12/5/2017     Sig - Route: Take 0.5 tablets by mouth Every Night. - Oral    ondansetron (ZOFRAN) tablet 4 mg 4 mg Every 8 Hours PRN 12/5/2017     Sig - Route: Take 1 tablet by mouth Every 8 (Eight) Hours As Needed for Nausea or Vomiting. - Oral    oxyCODONE-acetaminophen (PERCOCET)  MG per tablet 1 tablet 1 tablet 4 Times Daily PRN 12/5/2017     Sig - Route: Take 1 tablet by mouth 4 (Four) Times a Day As Needed for Moderate Pain . - Oral    pantoprazole (PROTONIX) EC tablet 40 mg 40 mg Every Morning 12/5/2017     Sig - Route: Take 1 tablet by mouth Every Morning. - Oral    piperacillin-tazobactam (ZOSYN) 3.375 g/100 mL 0.9% NS IVPB (mbp) 3.375 g Once 12/5/2017 12/5/2017    Sig - Route: Infuse 100 mL into a venous catheter 1 (One) Time. - Intravenous    piperacillin-tazobactam (ZOSYN) 3.375 g/100 mL 0.9% NS IVPB (mbp) 3.375 g Every 8 Hours 12/5/2017 12/12/2017    Sig - Route:  "Infuse 100 mL into a venous catheter Every 8 (Eight) Hours. - Intravenous    QUEtiapine (SEROquel) tablet 300 mg 300 mg Nightly 12/5/2017     Sig - Route: Take 1 tablet by mouth Every Night. - Oral    Risaquad-2 capsule 1 capsule 1 capsule Daily 12/5/2017     Sig - Route: Take 1 capsule by mouth Daily. - Oral    rOPINIRole (REQUIP) tablet 1 mg 1 mg Nightly 12/5/2017     Sig - Route: Take 1 tablet by mouth Every Night. - Oral    sodium chloride 0.9 % flush 10 mL 10 mL As Needed 12/4/2017     Sig - Route: Infuse 10 mL into a venous catheter As Needed for Line Care. - Intravenous    Cosign for Ordering: Accepted by Rohan Ruiz MD on 12/5/2017 12:42 AM    Linked Group 1:  \"And\" Linked Group Details        Pharmacy to Dose Zosyn (Discontinued)  4 Times Daily 12/5/2017 12/5/2017    Sig - Route: 4 (Four) Times a Day. - Does not apply    Reason for Discontinue: Therapy completed          Assessment/Plan    1.Sepsis with Acute Bronchitis and UTI: clinically improved continues to have productive cough. Ambulating and sitting in chair. Continue tx add incentive spir.  2.DM  3.HTN    Madi Tidwell MD  12/06/17  5:56 AM      "

## 2017-12-07 LAB
ANION GAP SERPL CALCULATED.3IONS-SCNC: 8.7 MMOL/L (ref 3.6–11.2)
BACTERIA SPEC AEROBE CULT: ABNORMAL
BACTERIA SPEC AEROBE CULT: ABNORMAL
BASOPHILS # BLD AUTO: 0.03 10*3/MM3 (ref 0–0.3)
BASOPHILS NFR BLD AUTO: 0.3 % (ref 0–2)
BUN BLD-MCNC: 10 MG/DL (ref 7–21)
BUN/CREAT SERPL: 14.9 (ref 7–25)
CALCIUM SPEC-SCNC: 8.3 MG/DL (ref 7.7–10)
CHLORIDE SERPL-SCNC: 106 MMOL/L (ref 99–112)
CO2 SERPL-SCNC: 26.3 MMOL/L (ref 24.3–31.9)
CREAT BLD-MCNC: 0.67 MG/DL (ref 0.43–1.29)
DEPRECATED RDW RBC AUTO: 46.4 FL (ref 37–54)
EOSINOPHIL # BLD AUTO: 0.54 10*3/MM3 (ref 0–0.7)
EOSINOPHIL NFR BLD AUTO: 5.7 % (ref 0–7)
ERYTHROCYTE [DISTWIDTH] IN BLOOD BY AUTOMATED COUNT: 14.8 % (ref 11.5–14.5)
GFR SERPL CREATININE-BSD FRML MDRD: 87 ML/MIN/1.73
GLUCOSE BLD-MCNC: 112 MG/DL (ref 70–110)
HCT VFR BLD AUTO: 32.3 % (ref 37–47)
HGB BLD-MCNC: 10.1 G/DL (ref 12–16)
IMM GRANULOCYTES # BLD: 0.08 10*3/MM3 (ref 0–0.03)
IMM GRANULOCYTES NFR BLD: 0.8 % (ref 0–0.5)
LYMPHOCYTES # BLD AUTO: 3.01 10*3/MM3 (ref 1–3)
LYMPHOCYTES NFR BLD AUTO: 31.7 % (ref 16–46)
MCH RBC QN AUTO: 27.7 PG (ref 27–33)
MCHC RBC AUTO-ENTMCNC: 31.3 G/DL (ref 33–37)
MCV RBC AUTO: 88.5 FL (ref 80–94)
MONOCYTES # BLD AUTO: 0.75 10*3/MM3 (ref 0.1–0.9)
MONOCYTES NFR BLD AUTO: 7.9 % (ref 0–12)
NEUTROPHILS # BLD AUTO: 5.09 10*3/MM3 (ref 1.4–6.5)
NEUTROPHILS NFR BLD AUTO: 53.6 % (ref 40–75)
OSMOLALITY SERPL CALC.SUM OF ELEC: 281.1 MOSM/KG (ref 273–305)
PLATELET # BLD AUTO: 201 10*3/MM3 (ref 130–400)
PMV BLD AUTO: 12.1 FL (ref 6–10)
POTASSIUM BLD-SCNC: 3.2 MMOL/L (ref 3.5–5.3)
RBC # BLD AUTO: 3.65 10*6/MM3 (ref 4.2–5.4)
SODIUM BLD-SCNC: 141 MMOL/L (ref 135–153)
WBC NRBC COR # BLD: 9.5 10*3/MM3 (ref 4.5–12.5)

## 2017-12-07 PROCEDURE — 85025 COMPLETE CBC W/AUTO DIFF WBC: CPT | Performed by: FAMILY MEDICINE

## 2017-12-07 PROCEDURE — 80048 BASIC METABOLIC PNL TOTAL CA: CPT | Performed by: FAMILY MEDICINE

## 2017-12-07 PROCEDURE — 25010000002 CEFTRIAXONE: Performed by: FAMILY MEDICINE

## 2017-12-07 RX ORDER — POTASSIUM CHLORIDE 20 MEQ/1
40 TABLET, EXTENDED RELEASE ORAL ONCE
Status: COMPLETED | OUTPATIENT
Start: 2017-12-07 | End: 2017-12-07

## 2017-12-07 RX ADMIN — OXYCODONE HYDROCHLORIDE AND ACETAMINOPHEN 1 TABLET: 10; 325 TABLET ORAL at 05:13

## 2017-12-07 RX ADMIN — METOPROLOL TARTRATE 50 MG: 50 TABLET, FILM COATED ORAL at 08:57

## 2017-12-07 RX ADMIN — ALPRAZOLAM 0.5 MG: 0.5 TABLET ORAL at 08:57

## 2017-12-07 RX ADMIN — ASPIRIN 81 MG: 81 TABLET ORAL at 08:58

## 2017-12-07 RX ADMIN — CEFTRIAXONE 1 G: 1 INJECTION, POWDER, FOR SOLUTION INTRAMUSCULAR; INTRAVENOUS at 23:28

## 2017-12-07 RX ADMIN — PANTOPRAZOLE SODIUM 40 MG: 40 TABLET, DELAYED RELEASE ORAL at 05:13

## 2017-12-07 RX ADMIN — POTASSIUM CHLORIDE 40 MEQ: 1500 TABLET, EXTENDED RELEASE ORAL at 14:42

## 2017-12-07 RX ADMIN — ROPINIROLE HYDROCHLORIDE 1 MG: 1 TABLET, FILM COATED ORAL at 21:08

## 2017-12-07 RX ADMIN — CETIRIZINE HYDROCHLORIDE 10 MG: 10 TABLET ORAL at 08:58

## 2017-12-07 RX ADMIN — BUMETANIDE 1 MG: 1 TABLET ORAL at 08:57

## 2017-12-07 RX ADMIN — ALPRAZOLAM 0.5 MG: 0.5 TABLET ORAL at 21:08

## 2017-12-07 RX ADMIN — AMITRIPTYLINE HYDROCHLORIDE 100 MG: 50 TABLET, FILM COATED ORAL at 21:08

## 2017-12-07 RX ADMIN — METOPROLOL TARTRATE 50 MG: 50 TABLET, FILM COATED ORAL at 21:08

## 2017-12-07 RX ADMIN — Medication 1 CAPSULE: at 08:57

## 2017-12-07 RX ADMIN — MIRTAZAPINE 7.5 MG: 15 TABLET, FILM COATED ORAL at 21:08

## 2017-12-07 RX ADMIN — OXYCODONE HYDROCHLORIDE AND ACETAMINOPHEN 1 TABLET: 10; 325 TABLET ORAL at 18:00

## 2017-12-07 RX ADMIN — QUETIAPINE FUMARATE 300 MG: 300 TABLET, FILM COATED ORAL at 21:08

## 2017-12-07 RX ADMIN — LEVOTHYROXINE SODIUM 125 MCG: 0.12 TABLET ORAL at 05:13

## 2017-12-07 RX ADMIN — ALPRAZOLAM 0.5 MG: 0.5 TABLET ORAL at 12:17

## 2017-12-07 RX ADMIN — OXYCODONE HYDROCHLORIDE AND ACETAMINOPHEN 1 TABLET: 10; 325 TABLET ORAL at 11:27

## 2017-12-07 RX ADMIN — ALPRAZOLAM 0.5 MG: 0.5 TABLET ORAL at 18:00

## 2017-12-07 NOTE — NURSING NOTE
"Transitional Care Note    Enrolled in [facility] Transitional Care Note    Enrolled in [facility] Transitional Care Services under theTCM Model to be followed for 6 weeks post discharge.  BTC will assist with support and education at time of transition home from hospital.  Hospital  will follow throughout the stay at [facility].  Home  will visit within 48 hours of discharge and follow with home vitals and telephone contact for 6 weeks.      Patient admitted to [facility] via Emergency Department, complaints of cough for 7 days and was currently taking a z-pack.  WBC 13,000 lactic acid 2.2.  Admitted for further evaluation and treatment.    Patient sitting up in a chair at the bedside.  Very talkative pleasant patient.  She tells me that her sons are very involved with her care.  I explained transition to home services and patient is agreeable to home visits.  Kasandra Henriquez RN will be the home .      Clinical Assessment Instrument Scores:  >Short Portable Mental Status 10, normal mental function  >Geriatric Depression Scale 3, normal  >IADL 5, independent with ADL's  >YOUNG-ADL 6, independent with self-care  >Overall Quality of Life \"GOOD\"  >Subjective Health Rating \"FAIR\"  >Symptom Bother Scale 30  >General Anxiety  Scale 3  >REAL SF 7 indicates reads on 12th grade level  "

## 2017-12-07 NOTE — PROGRESS NOTES
Progress Note   12/07/17      Subjective     Interval History:     Complaints: Feeling some better less dyspnea, still with cough but improved.  Ambulated some yesterday.        Objective     Intake & Output (last day)       12/06 0701 - 12/07 0700    P.O. 1060    Total Intake(mL/kg) 1060 (11.5)    Net +1060         Unmeasured Urine Occurrence 6 x          Vital Signs  Temp:  [97.5 °F (36.4 °C)-97.9 °F (36.6 °C)] 97.9 °F (36.6 °C)  Heart Rate:  [79-83] 79  Resp:  [16-20] 20  BP: (102-122)/(52-60) 113/60    Physical Exam:   General Appearance alert, appears stated age and cooperative   Lungs scattered rhonchi improved airflow   Heart regular rhythm & normal rate   Abdomen normal bowel sounds   Extremities no edema    Labs:  Lab Results (last 72 hours)     Procedure Component Value Units Date/Time    Influenza Antigen, Rapid - Swab, Nasopharynx [[id number]]  (Normal) Collected:  12/04/17 2302    Specimen:  Swab from Nasopharynx Updated:  12/04/17 2318     Influenza A Ag, EIA Negative     Influenza B Ag, EIA Negative    Urinalysis With / Culture If Indicated - Urine, Clean Catch [[id number]]  (Abnormal) Collected:  12/04/17 2325    Specimen:  Urine from Urine, Clean Catch Updated:  12/04/17 2336     Color, UA Yellow     Appearance, UA Cloudy (A)     pH, UA 5.5     Specific Gravity, UA 1.014     Glucose, UA Negative     Ketones, UA Negative     Bilirubin, UA Negative     Blood, UA Small (1+) (A)     Protein, UA Negative     Leuk Esterase, UA Large (3+) (A)     Nitrite, UA Negative     Urobilinogen, UA 0.2 E.U./dL    Urinalysis, Microscopic Only - Urine, Clean Catch [[id number]]  (Abnormal) Collected:  12/04/17 2325    Specimen:  Urine from Urine, Clean Catch Updated:  12/04/17 2336     RBC, UA 3-6 (A) /HPF      WBC, UA Too Numerous to Count (A) /HPF      Bacteria, UA 4+ (A) /HPF      Squamous Epithelial Cells, UA None Seen /HPF      Hyaline Casts, UA None Seen /LPF      Methodology Automated Microscopy    CBC &  Differential [[id number]] Collected:  12/04/17 2325    Specimen:  Blood Updated:  12/04/17 2338    Narrative:       The following orders were created for panel order CBC & Differential.  Procedure                               Abnormality         Status                     ---------                               -----------         ------                     CBC Auto Differential[[id number]]        Abnormal            Final result                 Please view results for these tests on the individual orders.    CBC Auto Differential [[id number]]  (Abnormal) Collected:  12/04/17 2325    Specimen:  Blood Updated:  12/04/17 2338     WBC 13.52 (H) 10*3/mm3      RBC 4.26 10*6/mm3      Hemoglobin 11.6 (L) g/dL      Hematocrit 36.4 (L) %      MCV 85.4 fL      MCH 27.2 pg      MCHC 31.9 (L) g/dL      RDW 14.6 (H) %      RDW-SD 44.4 fl      MPV 11.9 (H) fL      Platelets 160 10*3/mm3      Neutrophil % 66.4 %      Lymphocyte % 21.3 %      Monocyte % 7.9 %      Eosinophil % 3.8 %      Basophil % 0.2 %      Immature Grans % 0.4 %      Neutrophils, Absolute 8.97 (H) 10*3/mm3      Lymphocytes, Absolute 2.88 10*3/mm3      Monocytes, Absolute 1.07 (H) 10*3/mm3      Eosinophils, Absolute 0.51 10*3/mm3      Basophils, Absolute 0.03 10*3/mm3      Immature Grans, Absolute 0.06 (H) 10*3/mm3     Comprehensive Metabolic Panel [[id number]]  (Abnormal) Collected:  12/04/17 2325    Specimen:  Blood Updated:  12/05/17 0034     Glucose 120 (H) mg/dL      BUN 11 mg/dL      Creatinine 0.81 mg/dL      Sodium 138 mmol/L      Potassium 3.5 mmol/L      Chloride 100 mmol/L      CO2 29.5 mmol/L      Calcium 10.0 mg/dL      Total Protein 7.5 g/dL      Albumin 4.40 g/dL      ALT (SGPT) 53 (H) U/L      AST (SGOT) 43 (H) U/L      Alkaline Phosphatase 113 (H) U/L       Note New Reference Ranges        Total Bilirubin 0.4 mg/dL      eGFR Non African Amer 70 mL/min/1.73      Globulin 3.1 gm/dL      A/G Ratio 1.4 (L) g/dL      BUN/Creatinine Ratio 13.6     Anion  Gap 8.5 mmol/L     Narrative:       The MDRD GFR formula is only valid for adults with stable renal function between ages 18 and 70.    Osmolality, Calculated [[id number]]  (Normal) Collected:  12/04/17 2325    Specimen:  Blood Updated:  12/05/17 0034     Osmolality Calc 276.3 mOsm/kg     Lactic Acid, Plasma [[id number]]  (Abnormal) Collected:  12/05/17 0051    Specimen:  Blood Updated:  12/05/17 0122     Lactate 2.2 (C) mmol/L     BNP [[id number]]  (Abnormal) Collected:  12/05/17 0131    Specimen:  Blood Updated:  12/05/17 0202     .0 (H) pg/mL     Lactic Acid, Reflex Timer [[id number]] Collected:  12/05/17 0051    Specimen:  Blood Updated:  12/05/17 0431     Extra Tube Hold for add-ons.      Auto resulted.       Lactic Acid, Reflex [[id number]]  (Abnormal) Collected:  12/05/17 0505    Specimen:  Blood Updated:  12/05/17 0548     Lactate 3.2 (C) mmol/L     Troponin [[id number]]  (Normal) Collected:  12/05/17 0650    Specimen:  Blood Updated:  12/05/17 0737     Troponin I 0.017 ng/mL     Narrative:       Ultra Troponin I Reference Range:         <=0.039 ng/mL: Negative    0.04-0.779 ng/mL: Indeterminate Range. Suspicious of MI.  Clinical correlation required.       >=0.78  ng/mL: Consistent with myocardial injury.  Clinical correlation required.    CK [[id number]]  (Abnormal) Collected:  12/05/17 0650    Specimen:  Blood Updated:  12/05/17 0740     Creatine Kinase 337 (H) U/L     CK-MB [[id number]]  (Normal) Collected:  12/05/17 0650    Specimen:  Blood Updated:  12/05/17 0740     CKMB 4.49 ng/mL     CK-MB Index [[id number]]  (Normal) Collected:  12/05/17 0650    Specimen:  Blood Updated:  12/05/17 0740     CK-MB Index 1.3 %     Troponin [[id number]]  (Normal) Collected:  12/05/17 1348    Specimen:  Blood Updated:  12/05/17 1432     Troponin I 0.010 ng/mL     Narrative:       Ultra Troponin I Reference Range:         <=0.039 ng/mL: Negative    0.04-0.779 ng/mL: Indeterminate Range. Suspicious of MI.  Clinical  correlation required.       >=0.78  ng/mL: Consistent with myocardial injury.  Clinical correlation required.    Troponin [[id number]]  (Normal) Collected:  12/05/17 1849    Specimen:  Blood Updated:  12/05/17 1937     Troponin I 0.010 ng/mL     Narrative:       Ultra Troponin I Reference Range:         <=0.039 ng/mL: Negative    0.04-0.779 ng/mL: Indeterminate Range. Suspicious of MI.  Clinical correlation required.       >=0.78  ng/mL: Consistent with myocardial injury.  Clinical correlation required.    Urine Culture - Urine, Urine, Clean Catch [[id number]]  (Abnormal) Collected:  12/04/17 2325    Specimen:  Urine from Urine, Clean Catch Updated:  12/06/17 0827     Urine Culture --      >100,000 CFU/mL Gram Negative Bacilli (A)    Blood Culture - Blood, [[id number]]  (Normal) Collected:  12/05/17 0051    Specimen:  Blood from Arm, Left Updated:  12/07/17 0101     Blood Culture No growth at 2 days    Blood Culture - Blood, [[id number]]  (Normal) Collected:  12/05/17 0141    Specimen:  Blood from Arm, Left Updated:  12/07/17 0146     Blood Culture No growth at 2 days    CBC & Differential [[id number]] Collected:  12/07/17 0216    Specimen:  Blood Updated:  12/07/17 0258    Narrative:       The following orders were created for panel order CBC & Differential.  Procedure                               Abnormality         Status                     ---------                               -----------         ------                     CBC Auto Differential[[id number]]        Abnormal            Final result                 Please view results for these tests on the individual orders.    CBC Auto Differential [[id number]]  (Abnormal) Collected:  12/07/17 0216    Specimen:  Blood Updated:  12/07/17 0258     WBC 9.50 10*3/mm3      RBC 3.65 (L) 10*6/mm3      Hemoglobin 10.1 (L) g/dL      Hematocrit 32.3 (L) %      MCV 88.5 fL      MCH 27.7 pg      MCHC 31.3 (L) g/dL      RDW 14.8 (H) %      RDW-SD 46.4 fl      MPV 12.1 (H) fL       Platelets 201 10*3/mm3      Neutrophil % 53.6 %      Lymphocyte % 31.7 %      Monocyte % 7.9 %      Eosinophil % 5.7 %      Basophil % 0.3 %      Immature Grans % 0.8 (H) %      Neutrophils, Absolute 5.09 10*3/mm3      Lymphocytes, Absolute 3.01 (H) 10*3/mm3      Monocytes, Absolute 0.75 10*3/mm3      Eosinophils, Absolute 0.54 10*3/mm3      Basophils, Absolute 0.03 10*3/mm3      Immature Grans, Absolute 0.08 (H) 10*3/mm3     Basic Metabolic Panel [[id number]]  (Abnormal) Collected:  12/07/17 0216    Specimen:  Blood Updated:  12/07/17 0313     Glucose 112 (H) mg/dL      BUN 10 mg/dL      Creatinine 0.67 mg/dL      Sodium 141 mmol/L      Potassium 3.2 (L) mmol/L      Chloride 106 mmol/L      CO2 26.3 mmol/L      Calcium 8.3 mg/dL      eGFR Non African Amer 87 mL/min/1.73      BUN/Creatinine Ratio 14.9     Anion Gap 8.7 mmol/L     Narrative:       The MDRD GFR formula is only valid for adults with stable renal function between ages 18 and 70.    Osmolality, Calculated [[id number]]  (Normal) Collected:  12/07/17 0216    Specimen:  Blood Updated:  12/07/17 0313     Osmolality Calc 281.1 mOsm/kg           Xray:  Imaging Results (last 24 hours)     ** No results found for the last 24 hours. **             Results Review:     I reviewed the patient's new clinical results.    Medication Review:   Hospital Medications (active)       Dose Frequency Start End    ALPRAZolam (XANAX) tablet 0.5 mg 0.5 mg 4 Times Daily 12/5/2017     Sig - Route: Take 1 tablet by mouth 4 (Four) Times a Day. - Oral    amitriptyline (ELAVIL) tablet 100 mg 100 mg Nightly 12/5/2017     Sig - Route: Take 2 tablets by mouth Every Night. - Oral    aspirin EC tablet 81 mg 81 mg Daily 12/5/2017     Sig - Route: Take 1 tablet by mouth Daily. - Oral    bumetanide (BUMEX) tablet 1 mg 1 mg Daily 12/6/2017     Sig - Route: Take 1 tablet by mouth Daily. - Oral    cefTRIAXone (ROCEPHIN) 1 g/100 mL 0.9% NS (MBP) 1 g Every 24 Hours 12/5/2017 12/12/2017    Sig -  "Route: Infuse 100 mL into a venous catheter Daily. - Intravenous    cetirizine (zyrTEC) tablet 10 mg 10 mg Daily 12/5/2017     Sig - Route: Take 1 tablet by mouth Daily. - Oral    levothyroxine (SYNTHROID, LEVOTHROID) tablet 125 mcg 125 mcg Every Early Morning 12/5/2017     Sig - Route: Take 1 tablet by mouth Every Morning. - Oral    metoprolol tartrate (LOPRESSOR) tablet 50 mg 50 mg Every 12 Hours Scheduled 12/5/2017     Sig - Route: Take 1 tablet by mouth Every 12 (Twelve) Hours. - Oral    mirtazapine (REMERON) tablet 7.5 mg 7.5 mg Nightly 12/5/2017     Sig - Route: Take 0.5 tablets by mouth Every Night. - Oral    ondansetron (ZOFRAN) tablet 4 mg 4 mg Every 8 Hours PRN 12/5/2017     Sig - Route: Take 1 tablet by mouth Every 8 (Eight) Hours As Needed for Nausea or Vomiting. - Oral    oxyCODONE-acetaminophen (PERCOCET)  MG per tablet 1 tablet 1 tablet 4 Times Daily PRN 12/5/2017     Sig - Route: Take 1 tablet by mouth 4 (Four) Times a Day As Needed for Moderate Pain . - Oral    pantoprazole (PROTONIX) EC tablet 40 mg 40 mg Every Morning 12/5/2017     Sig - Route: Take 1 tablet by mouth Every Morning. - Oral    QUEtiapine (SEROquel) tablet 300 mg 300 mg Nightly 12/5/2017     Sig - Route: Take 1 tablet by mouth Every Night. - Oral    Risaquad-2 capsule 1 capsule 1 capsule Daily 12/5/2017     Sig - Route: Take 1 capsule by mouth Daily. - Oral    rOPINIRole (REQUIP) tablet 1 mg 1 mg Nightly 12/5/2017     Sig - Route: Take 1 tablet by mouth Every Night. - Oral    sodium chloride 0.9 % flush 10 mL 10 mL As Needed 12/4/2017     Sig - Route: Infuse 10 mL into a venous catheter As Needed for Line Care. - Intravenous    Cosign for Ordering: Accepted by Rohan Ruiz MD on 12/5/2017 12:42 AM    Linked Group 1:  \"And\" Linked Group Details        piperacillin-tazobactam (ZOSYN) 3.375 g/100 mL 0.9% NS IVPB (mbp) (Discontinued) 3.375 g Every 8 Hours 12/5/2017 12/6/2017    Sig - Route: Infuse 100 mL into a venous " catheter Every 8 (Eight) Hours. - Intravenous          Assessment/Plan    Sepsis secondary to Bronchitis and UTI: Doing better breathing improving slowly.  No dysuria.  Continue IV abx plan to d/c home in am  DM  HTN    Madi Tidwell MD  12/07/17  5:58 AM

## 2017-12-07 NOTE — PROGRESS NOTES
Continued Stay Note  JUAN Hurtado     Patient Name: Indigo Sorenson  MRN: [id number]  Today's Date: 12/7/2017    Admit Date: 12/4/2017          Discharge Plan       12/07/17 1052    Case Management/Social Work Plan    Plan Patient lives at [facility] and plans to return there at discharge. States her two sons assist her daily as needed.    Patient/Family In Agreement With Plan yes    Additional Comments CM  follow up. Patient is sitting up in bed. Alert and oriented. Continues on IV ATB's. Possible dc in AM per MD notes. States she is getting up with assist. Her RN, Jami says she will ambulate her in hallways today. She denies any needs and says her sons and maintenance check on her daily. She has been enrolled in [facility] program. Denies Home Health needs. SS has been consulted for any possible home care needs. CM will follow and assist as needed.              Discharge Codes     None        Expected Discharge Date and Time     Expected Discharge Date Expected Discharge Time    Dec 8, 2017             Sharri Drew RN

## 2017-12-07 NOTE — PROGRESS NOTES
Discharge Planning Assessment   [place]     Patient Name: Indigo Sorenson  MRN: [id number]  Today's Date: 12/7/2017    Admit Date: 12/4/2017          Discharge Needs Assessment     None            Discharge Plan       12/07/17 1304    Case Management/Social Work Plan    Plan SS received consult per nsg for discharge planning.  SS spoke with pt on this date.  Pt lives at home at [facility] and plans to return home at discharge.  Pt states no needs.  SS will follow and assist with discharge plans.     Patient/Family In Agreement With Plan yes      12/07/17 1052    Case Management/Social Work Plan    Plan Patient lives at [facility] and plans to return there at discharge. Her two sons assist her daily as needed.    Patient/Family In Agreement With Plan yes    Additional Comments CM ri plans follow up. Patient is sitting up in bed. Alert and oriented. Continues on IV ATB's. Possible dc in AM per MD notes. States she is getting up with assist. Her RN, Jami says she will ambulate her in hallwys today. She denies any needs and says her sons and maintenance check on her daily. Says she does not trust people to come into her house. Denies Home Health needs. SS has been consulted for any possible home care needs. CM will follow and assist as needed.        Discharge Placement     No information found        Expected Discharge Date and Time     Expected Discharge Date Expected Discharge Time    Dec 8, 2017               Demographic Summary     None            Functional Status     None            Psychosocial     None            Abuse/Neglect     None            Legal     None            Substance Abuse     None            Patient Forms     None          Sandra Jimenes

## 2017-12-07 NOTE — PLAN OF CARE
Problem: Sepsis (Adult)  Goal: Signs and Symptoms of Listed Potential Problems Will be Absent or Manageable (Sepsis)  Outcome: Ongoing (interventions implemented as appropriate)    12/05/17 1115   Sepsis   Problems Assessed (Sepsis) all   Problems Present (Sepsis) none         Problem: Fall Risk (Adult)  Goal: Identify Related Risk Factors and Signs and Symptoms  Outcome: Ongoing (interventions implemented as appropriate)    12/05/17 1115   Fall Risk   Fall Risk: Related Risk Factors age-related changes;gait/mobility problems   Fall Risk: Signs and Symptoms presence of risk factors       Goal: Absence of Falls  Outcome: Ongoing (interventions implemented as appropriate)    Problem: Infection, Risk/Actual (Adult)  Goal: Identify Related Risk Factors and Signs and Symptoms  Outcome: Ongoing (interventions implemented as appropriate)  Goal: Infection Prevention/Resolution  Outcome: Ongoing (interventions implemented as appropriate)

## 2017-12-08 LAB
ANION GAP SERPL CALCULATED.3IONS-SCNC: 7.8 MMOL/L (ref 3.6–11.2)
BUN BLD-MCNC: 9 MG/DL (ref 7–21)
BUN/CREAT SERPL: 12.9 (ref 7–25)
CALCIUM SPEC-SCNC: 8.6 MG/DL (ref 7.7–10)
CHLORIDE SERPL-SCNC: 104 MMOL/L (ref 99–112)
CO2 SERPL-SCNC: 28.2 MMOL/L (ref 24.3–31.9)
CREAT BLD-MCNC: 0.7 MG/DL (ref 0.43–1.29)
GFR SERPL CREATININE-BSD FRML MDRD: 82 ML/MIN/1.73
GLUCOSE BLD-MCNC: 160 MG/DL (ref 70–110)
OSMOLALITY SERPL CALC.SUM OF ELEC: 281.5 MOSM/KG (ref 273–305)
POTASSIUM BLD-SCNC: 3.4 MMOL/L (ref 3.5–5.3)
SODIUM BLD-SCNC: 140 MMOL/L (ref 135–153)

## 2017-12-08 PROCEDURE — 80048 BASIC METABOLIC PNL TOTAL CA: CPT | Performed by: FAMILY MEDICINE

## 2017-12-08 RX ORDER — POTASSIUM CHLORIDE 20 MEQ/1
40 TABLET, EXTENDED RELEASE ORAL ONCE
Status: COMPLETED | OUTPATIENT
Start: 2017-12-08 | End: 2017-12-08

## 2017-12-08 RX ADMIN — ALPRAZOLAM 0.5 MG: 0.5 TABLET ORAL at 20:06

## 2017-12-08 RX ADMIN — ASPIRIN 81 MG: 81 TABLET ORAL at 08:19

## 2017-12-08 RX ADMIN — ALPRAZOLAM 0.5 MG: 0.5 TABLET ORAL at 17:02

## 2017-12-08 RX ADMIN — ROPINIROLE HYDROCHLORIDE 1 MG: 1 TABLET, FILM COATED ORAL at 20:06

## 2017-12-08 RX ADMIN — QUETIAPINE FUMARATE 300 MG: 300 TABLET, FILM COATED ORAL at 20:06

## 2017-12-08 RX ADMIN — CETIRIZINE HYDROCHLORIDE 10 MG: 10 TABLET ORAL at 08:19

## 2017-12-08 RX ADMIN — ALPRAZOLAM 0.5 MG: 0.5 TABLET ORAL at 12:31

## 2017-12-08 RX ADMIN — METOPROLOL TARTRATE 50 MG: 50 TABLET, FILM COATED ORAL at 20:06

## 2017-12-08 RX ADMIN — Medication 1 CAPSULE: at 08:19

## 2017-12-08 RX ADMIN — OXYCODONE HYDROCHLORIDE AND ACETAMINOPHEN 1 TABLET: 10; 325 TABLET ORAL at 05:23

## 2017-12-08 RX ADMIN — PANTOPRAZOLE SODIUM 40 MG: 40 TABLET, DELAYED RELEASE ORAL at 05:23

## 2017-12-08 RX ADMIN — LEVOTHYROXINE SODIUM 125 MCG: 0.12 TABLET ORAL at 05:23

## 2017-12-08 RX ADMIN — MIRTAZAPINE 7.5 MG: 15 TABLET, FILM COATED ORAL at 20:06

## 2017-12-08 RX ADMIN — OXYCODONE HYDROCHLORIDE AND ACETAMINOPHEN 1 TABLET: 10; 325 TABLET ORAL at 20:11

## 2017-12-08 RX ADMIN — POTASSIUM CHLORIDE 40 MEQ: 1500 TABLET, EXTENDED RELEASE ORAL at 08:19

## 2017-12-08 RX ADMIN — AMITRIPTYLINE HYDROCHLORIDE 100 MG: 50 TABLET, FILM COATED ORAL at 20:06

## 2017-12-08 RX ADMIN — ALPRAZOLAM 0.5 MG: 0.5 TABLET ORAL at 08:19

## 2017-12-08 RX ADMIN — OXYCODONE HYDROCHLORIDE AND ACETAMINOPHEN 1 TABLET: 10; 325 TABLET ORAL at 12:31

## 2017-12-08 RX ADMIN — BUMETANIDE 1 MG: 1 TABLET ORAL at 08:19

## 2017-12-08 RX ADMIN — METOPROLOL TARTRATE 50 MG: 50 TABLET, FILM COATED ORAL at 08:19

## 2017-12-08 NOTE — PLAN OF CARE
Problem: Sepsis (Adult)  Goal: Signs and Symptoms of Listed Potential Problems Will be Absent or Manageable (Sepsis)  Outcome: Ongoing (interventions implemented as appropriate)    12/05/17 1115   Sepsis   Problems Assessed (Sepsis) all   Problems Present (Sepsis) none         Problem: Fall Risk (Adult)  Goal: Identify Related Risk Factors and Signs and Symptoms  Outcome: Ongoing (interventions implemented as appropriate)    12/05/17 1115   Fall Risk   Fall Risk: Related Risk Factors age-related changes;gait/mobility problems   Fall Risk: Signs and Symptoms presence of risk factors       Goal: Absence of Falls  Outcome: Ongoing (interventions implemented as appropriate)    12/06/17 1913   Fall Risk (Adult)   Absence of Falls making progress toward outcome         Problem: Infection, Risk/Actual (Adult)  Goal: Identify Related Risk Factors and Signs and Symptoms  Outcome: Ongoing (interventions implemented as appropriate)    12/05/17 1115   Infection, Risk/Actual   Signs and Symptoms (Infection, Risk/Actual) weakness       Goal: Infection Prevention/Resolution  Outcome: Ongoing (interventions implemented as appropriate)    12/06/17 1913   Infection, Risk/Actual (Adult)   Infection Prevention/Resolution making progress toward outcome

## 2017-12-08 NOTE — PROGRESS NOTES
Progress Note   12/08/17      Subjective     Interval History:     Complaints: REsting no distress this am, +Cough +congestion ambulating in room dimple PO.  No fevers no chills still with mild dyspnea with exertion        Objective     Intake & Output (last day)       12/07 0701 - 12/08 0700    P.O. 1560    IV Piggyback 100    Total Intake(mL/kg) 1660 (19.5)    Urine (mL/kg/hr) 1900 (0.9)    Stool 0 (0)    Total Output 1900    Net -240         Unmeasured Urine Occurrence 4 x    Unmeasured Stool Occurrence 2 x          Vital Signs  Temp:  [97.5 °F (36.4 °C)-98 °F (36.7 °C)] 98 °F (36.7 °C)  Heart Rate:  [77-91] 91  Resp:  [18-20] 20  BP: (116-148)/(55-68) 116/56    Physical Exam:   General Appearance alert, appears stated age and cooperative   Head normocephalic, without obvious abnormality and atraumatic   Eyes conjunctivae and sclerae normal, no icterus and PERRLA   Neck no adenopathy, suppple, no thyromegaly, no carotid bruit and no JVD   Lungs scattered rhonchi with improved airflow   Heart regular rhythm & normal rate, normal S1, S2 and no murmur, no maria t   Abdomen normal bowel sounds, no masses, no hepatomegaly, soft non-tender   Extremities moves extremities well, no edema, no cyanosis and no redness    Labs:  Lab Results (last 72 hours)     Procedure Component Value Units Date/Time    Troponin [[id number]]  (Normal) Collected:  12/05/17 0650    Specimen:  Blood Updated:  12/05/17 0737     Troponin I 0.017 ng/mL     Narrative:       Ultra Troponin I Reference Range:         <=0.039 ng/mL: Negative    0.04-0.779 ng/mL: Indeterminate Range. Suspicious of MI.  Clinical correlation required.       >=0.78  ng/mL: Consistent with myocardial injury.  Clinical correlation required.    CK [[id number]]  (Abnormal) Collected:  12/05/17 0650    Specimen:  Blood Updated:  12/05/17 0740     Creatine Kinase 337 (H) U/L     CK-MB [[id number]]  (Normal) Collected:  12/05/17 0650    Specimen:  Blood Updated:  12/05/17 0740      CKMB 4.49 ng/mL     CK-MB Index [[id number]]  (Normal) Collected:  12/05/17 0650    Specimen:  Blood Updated:  12/05/17 0740     CK-MB Index 1.3 %     Troponin [[id number]]  (Normal) Collected:  12/05/17 1348    Specimen:  Blood Updated:  12/05/17 1432     Troponin I 0.010 ng/mL     Narrative:       Ultra Troponin I Reference Range:         <=0.039 ng/mL: Negative    0.04-0.779 ng/mL: Indeterminate Range. Suspicious of MI.  Clinical correlation required.       >=0.78  ng/mL: Consistent with myocardial injury.  Clinical correlation required.    Troponin [[id number]]  (Normal) Collected:  12/05/17 1849    Specimen:  Blood Updated:  12/05/17 1937     Troponin I 0.010 ng/mL     Narrative:       Ultra Troponin I Reference Range:         <=0.039 ng/mL: Negative    0.04-0.779 ng/mL: Indeterminate Range. Suspicious of MI.  Clinical correlation required.       >=0.78  ng/mL: Consistent with myocardial injury.  Clinical correlation required.    CBC & Differential [[id number]] Collected:  12/07/17 0216    Specimen:  Blood Updated:  12/07/17 0258    Narrative:       The following orders were created for panel order CBC & Differential.  Procedure                               Abnormality         Status                     ---------                               -----------         ------                     CBC Auto Differential[[id number]]        Abnormal            Final result                 Please view results for these tests on the individual orders.    CBC Auto Differential [[id number]]  (Abnormal) Collected:  12/07/17 0216    Specimen:  Blood Updated:  12/07/17 0258     WBC 9.50 10*3/mm3      RBC 3.65 (L) 10*6/mm3      Hemoglobin 10.1 (L) g/dL      Hematocrit 32.3 (L) %      MCV 88.5 fL      MCH 27.7 pg      MCHC 31.3 (L) g/dL      RDW 14.8 (H) %      RDW-SD 46.4 fl      MPV 12.1 (H) fL      Platelets 201 10*3/mm3      Neutrophil % 53.6 %      Lymphocyte % 31.7 %      Monocyte % 7.9 %      Eosinophil % 5.7 %      Basophil %  0.3 %      Immature Grans % 0.8 (H) %      Neutrophils, Absolute 5.09 10*3/mm3      Lymphocytes, Absolute 3.01 (H) 10*3/mm3      Monocytes, Absolute 0.75 10*3/mm3      Eosinophils, Absolute 0.54 10*3/mm3      Basophils, Absolute 0.03 10*3/mm3      Immature Grans, Absolute 0.08 (H) 10*3/mm3     Basic Metabolic Panel [[id number]]  (Abnormal) Collected:  12/07/17 0216    Specimen:  Blood Updated:  12/07/17 0313     Glucose 112 (H) mg/dL      BUN 10 mg/dL      Creatinine 0.67 mg/dL      Sodium 141 mmol/L      Potassium 3.2 (L) mmol/L      Chloride 106 mmol/L      CO2 26.3 mmol/L      Calcium 8.3 mg/dL      eGFR Non African Amer 87 mL/min/1.73      BUN/Creatinine Ratio 14.9     Anion Gap 8.7 mmol/L     Narrative:       The MDRD GFR formula is only valid for adults with stable renal function between ages 18 and 70.    Osmolality, Calculated [[id number]]  (Normal) Collected:  12/07/17 0216    Specimen:  Blood Updated:  12/07/17 0313     Osmolality Calc 281.1 mOsm/kg     Urine Culture - Urine, Urine, Clean Catch [[id number]]  (Abnormal)  (Susceptibility) Collected:  12/04/17 2325    Specimen:  Urine from Urine, Clean Catch Updated:  12/07/17 1230     Urine Culture --      >100,000 CFU/mL Escherichia coli (A)    Susceptibility      Escherichia coli     CLARICE     Amikacin <=16 ug/ml Susceptible     Amoxicillin + Clavulanate <=8/4 ug/ml Susceptible     Ampicillin >16 ug/ml Resistant     Ampicillin + Sulbactam 16/8 ug/ml Intermediate     Aztreonam <=8 ug/ml Susceptible     Cefazolin <=8 ug/ml Susceptible     Ciprofloxacin <=1 ug/ml Susceptible     Doripenem <=0.5 ug/ml Susceptible     Ertapenem <=1 ug/ml Susceptible     Gentamicin <=4 ug/ml Susceptible     Imipenem <=1 ug/ml Susceptible     Levofloxacin <=2 ug/ml Susceptible     Nitrofurantoin <=32 ug/ml Susceptible     Piperacillin + Tazobactam <=16 ug/ml Susceptible     Tetracycline <=4 ug/ml Susceptible     Tobramycin <=4 ug/ml Susceptible     Trimethoprim + Sulfamethoxazole  >2/38 ug/ml Resistant                    Blood Culture - Blood, [[id number]]  (Normal) Collected:  12/05/17 0051    Specimen:  Blood from Arm, Left Updated:  12/08/17 0101     Blood Culture No growth at 3 days    Blood Culture - Blood, [[id number]]  (Normal) Collected:  12/05/17 0141    Specimen:  Blood from Arm, Left Updated:  12/08/17 0146     Blood Culture No growth at 3 days    Basic Metabolic Panel [[id number]]  (Abnormal) Collected:  12/08/17 0044    Specimen:  Blood Updated:  12/08/17 0214     Glucose 160 (H) mg/dL      BUN 9 mg/dL      Creatinine 0.70 mg/dL      Sodium 140 mmol/L      Potassium 3.4 (L) mmol/L      Chloride 104 mmol/L      CO2 28.2 mmol/L      Calcium 8.6 mg/dL      eGFR Non African Amer 82 mL/min/1.73      BUN/Creatinine Ratio 12.9     Anion Gap 7.8 mmol/L     Narrative:       The MDRD GFR formula is only valid for adults with stable renal function between ages 18 and 70.    Osmolality, Calculated [[id number]]  (Normal) Collected:  12/08/17 0044    Specimen:  Blood Updated:  12/08/17 0214     Osmolality Calc 281.5 mOsm/kg           Xray:  Imaging Results (last 24 hours)     ** No results found for the last 24 hours. **             Results Review:     I reviewed the patient's new clinical results.    Medication Review:   Hospital Medications (active)       Dose Frequency Start End    ALPRAZolam (XANAX) tablet 0.5 mg 0.5 mg 4 Times Daily 12/5/2017     Sig - Route: Take 1 tablet by mouth 4 (Four) Times a Day. - Oral    amitriptyline (ELAVIL) tablet 100 mg 100 mg Nightly 12/5/2017     Sig - Route: Take 2 tablets by mouth Every Night. - Oral    aspirin EC tablet 81 mg 81 mg Daily 12/5/2017     Sig - Route: Take 1 tablet by mouth Daily. - Oral    bumetanide (BUMEX) tablet 1 mg 1 mg Daily 12/6/2017     Sig - Route: Take 1 tablet by mouth Daily. - Oral    cefTRIAXone (ROCEPHIN) 1 g/100 mL 0.9% NS (MBP) 1 g Every 24 Hours 12/5/2017 12/12/2017    Sig - Route: Infuse 100 mL into a venous catheter Daily. -  "Intravenous    cetirizine (zyrTEC) tablet 10 mg 10 mg Daily 12/5/2017     Sig - Route: Take 1 tablet by mouth Daily. - Oral    levothyroxine (SYNTHROID, LEVOTHROID) tablet 125 mcg 125 mcg Every Early Morning 12/5/2017     Sig - Route: Take 1 tablet by mouth Every Morning. - Oral    metoprolol tartrate (LOPRESSOR) tablet 50 mg 50 mg Every 12 Hours Scheduled 12/5/2017     Sig - Route: Take 1 tablet by mouth Every 12 (Twelve) Hours. - Oral    mirtazapine (REMERON) tablet 7.5 mg 7.5 mg Nightly 12/5/2017     Sig - Route: Take 0.5 tablets by mouth Every Night. - Oral    ondansetron (ZOFRAN) tablet 4 mg 4 mg Every 8 Hours PRN 12/5/2017     Sig - Route: Take 1 tablet by mouth Every 8 (Eight) Hours As Needed for Nausea or Vomiting. - Oral    oxyCODONE-acetaminophen (PERCOCET)  MG per tablet 1 tablet 1 tablet 4 Times Daily PRN 12/5/2017     Sig - Route: Take 1 tablet by mouth 4 (Four) Times a Day As Needed for Moderate Pain . - Oral    pantoprazole (PROTONIX) EC tablet 40 mg 40 mg Every Morning 12/5/2017     Sig - Route: Take 1 tablet by mouth Every Morning. - Oral    potassium chloride (K-DUR,KLOR-CON) CR tablet 40 mEq 40 mEq Once 12/7/2017 12/7/2017    Sig - Route: Take 2 tablets by mouth 1 (One) Time. - Oral    QUEtiapine (SEROquel) tablet 300 mg 300 mg Nightly 12/5/2017     Sig - Route: Take 1 tablet by mouth Every Night. - Oral    Risaquad-2 capsule 1 capsule 1 capsule Daily 12/5/2017     Sig - Route: Take 1 capsule by mouth Daily. - Oral    rOPINIRole (REQUIP) tablet 1 mg 1 mg Nightly 12/5/2017     Sig - Route: Take 1 tablet by mouth Every Night. - Oral    sodium chloride 0.9 % flush 10 mL 10 mL As Needed 12/4/2017     Sig - Route: Infuse 10 mL into a venous catheter As Needed for Line Care. - Intravenous    Cosign for Ordering: Accepted by Rohan Ruiz MD on 12/5/2017 12:42 AM    Linked Group 1:  \"And\" Linked Group Details              Assessment/Plan    1.Sepsis from Acute Bronchitis and UTI: continue " IV Rocephin nebs and oxy supplementation.  Improving plan for D/C home in am  2.Hypokalemia: replacing  3.HTN: stable      Madi Tidwell MD  12/08/17  5:54 AM

## 2017-12-08 NOTE — PROGRESS NOTES
Discharge Planning Assessment  JUAN Hurtado     Patient Name: Indigo Sorenson  MRN: [id number]  Today's Date: 12/8/2017    Admit Date: 12/4/2017          Discharge Needs Assessment     None            Discharge Plan       12/08/17 1134    Case Management/Social Work Plan    Plan Pt admitted on 12/4/17.  Pt lives at home ([facility]) and plans to return home at discharge.   Pt states no additional needs at discharge.  SS will follow and assist with discharge needs.     Patient/Family In Agreement With Plan yes        Discharge Placement     No information found        Expected Discharge Date and Time     Expected Discharge Date Expected Discharge Time    Dec 9, 2017               Demographic Summary     None            Functional Status     None            Psychosocial     None            Abuse/Neglect     None            Legal     None            Substance Abuse     None            Patient Forms     None          Sandra Jimenes

## 2017-12-09 LAB
ANION GAP SERPL CALCULATED.3IONS-SCNC: 6.6 MMOL/L (ref 3.6–11.2)
BASOPHILS # BLD AUTO: 0.05 10*3/MM3 (ref 0–0.3)
BASOPHILS NFR BLD AUTO: 0.5 % (ref 0–2)
BUN BLD-MCNC: 10 MG/DL (ref 7–21)
BUN/CREAT SERPL: 16.7 (ref 7–25)
CALCIUM SPEC-SCNC: 8.5 MG/DL (ref 7.7–10)
CHLORIDE SERPL-SCNC: 105 MMOL/L (ref 99–112)
CO2 SERPL-SCNC: 30.4 MMOL/L (ref 24.3–31.9)
CREAT BLD-MCNC: 0.6 MG/DL (ref 0.43–1.29)
DEPRECATED RDW RBC AUTO: 45.2 FL (ref 37–54)
EOSINOPHIL # BLD AUTO: 0.6 10*3/MM3 (ref 0–0.7)
EOSINOPHIL NFR BLD AUTO: 6.3 % (ref 0–7)
ERYTHROCYTE [DISTWIDTH] IN BLOOD BY AUTOMATED COUNT: 14.5 % (ref 11.5–14.5)
GFR SERPL CREATININE-BSD FRML MDRD: 98 ML/MIN/1.73
GLUCOSE BLD-MCNC: 113 MG/DL (ref 70–110)
HCT VFR BLD AUTO: 33.9 % (ref 37–47)
HGB BLD-MCNC: 10.7 G/DL (ref 12–16)
IMM GRANULOCYTES # BLD: 0.19 10*3/MM3 (ref 0–0.03)
IMM GRANULOCYTES NFR BLD: 2 % (ref 0–0.5)
LYMPHOCYTES # BLD AUTO: 2.95 10*3/MM3 (ref 1–3)
LYMPHOCYTES NFR BLD AUTO: 30.8 % (ref 16–46)
MCH RBC QN AUTO: 27.6 PG (ref 27–33)
MCHC RBC AUTO-ENTMCNC: 31.6 G/DL (ref 33–37)
MCV RBC AUTO: 87.4 FL (ref 80–94)
MONOCYTES # BLD AUTO: 0.65 10*3/MM3 (ref 0.1–0.9)
MONOCYTES NFR BLD AUTO: 6.8 % (ref 0–12)
NEUTROPHILS # BLD AUTO: 5.15 10*3/MM3 (ref 1.4–6.5)
NEUTROPHILS NFR BLD AUTO: 53.6 % (ref 40–75)
OSMOLALITY SERPL CALC.SUM OF ELEC: 283 MOSM/KG (ref 273–305)
PLATELET # BLD AUTO: 225 10*3/MM3 (ref 130–400)
PMV BLD AUTO: 11.2 FL (ref 6–10)
POTASSIUM BLD-SCNC: 3.8 MMOL/L (ref 3.5–5.3)
RBC # BLD AUTO: 3.88 10*6/MM3 (ref 4.2–5.4)
SODIUM BLD-SCNC: 142 MMOL/L (ref 135–153)
WBC NRBC COR # BLD: 9.59 10*3/MM3 (ref 4.5–12.5)

## 2017-12-09 PROCEDURE — 80048 BASIC METABOLIC PNL TOTAL CA: CPT | Performed by: FAMILY MEDICINE

## 2017-12-09 PROCEDURE — 94799 UNLISTED PULMONARY SVC/PX: CPT

## 2017-12-09 PROCEDURE — 25010000002 CEFTRIAXONE: Performed by: FAMILY MEDICINE

## 2017-12-09 PROCEDURE — 85025 COMPLETE CBC W/AUTO DIFF WBC: CPT | Performed by: FAMILY MEDICINE

## 2017-12-09 PROCEDURE — 94640 AIRWAY INHALATION TREATMENT: CPT

## 2017-12-09 RX ORDER — LIDOCAINE 50 MG/G
1 PATCH TOPICAL
Status: DISCONTINUED | OUTPATIENT
Start: 2017-12-09 | End: 2017-12-11 | Stop reason: HOSPADM

## 2017-12-09 RX ORDER — BENZONATATE 100 MG/1
200 CAPSULE ORAL 3 TIMES DAILY PRN
Status: DISCONTINUED | OUTPATIENT
Start: 2017-12-09 | End: 2017-12-11 | Stop reason: HOSPADM

## 2017-12-09 RX ORDER — IPRATROPIUM BROMIDE AND ALBUTEROL SULFATE 2.5; .5 MG/3ML; MG/3ML
3 SOLUTION RESPIRATORY (INHALATION) EVERY 6 HOURS PRN
Status: DISCONTINUED | OUTPATIENT
Start: 2017-12-09 | End: 2017-12-11 | Stop reason: HOSPADM

## 2017-12-09 RX ADMIN — OXYCODONE HYDROCHLORIDE AND ACETAMINOPHEN 1 TABLET: 10; 325 TABLET ORAL at 10:16

## 2017-12-09 RX ADMIN — LIDOCAINE 1 PATCH: 50 PATCH CUTANEOUS at 20:07

## 2017-12-09 RX ADMIN — METOPROLOL TARTRATE 50 MG: 50 TABLET, FILM COATED ORAL at 20:07

## 2017-12-09 RX ADMIN — Medication 1 CAPSULE: at 08:19

## 2017-12-09 RX ADMIN — OXYCODONE HYDROCHLORIDE AND ACETAMINOPHEN 1 TABLET: 10; 325 TABLET ORAL at 03:43

## 2017-12-09 RX ADMIN — ALPRAZOLAM 0.5 MG: 0.5 TABLET ORAL at 11:42

## 2017-12-09 RX ADMIN — PANTOPRAZOLE SODIUM 40 MG: 40 TABLET, DELAYED RELEASE ORAL at 04:42

## 2017-12-09 RX ADMIN — ROPINIROLE HYDROCHLORIDE 1 MG: 1 TABLET, FILM COATED ORAL at 20:07

## 2017-12-09 RX ADMIN — IPRATROPIUM BROMIDE AND ALBUTEROL SULFATE 3 ML: .5; 3 SOLUTION RESPIRATORY (INHALATION) at 20:55

## 2017-12-09 RX ADMIN — CEFTRIAXONE 1 G: 1 INJECTION, POWDER, FOR SOLUTION INTRAMUSCULAR; INTRAVENOUS at 23:22

## 2017-12-09 RX ADMIN — LEVOTHYROXINE SODIUM 125 MCG: 0.12 TABLET ORAL at 04:42

## 2017-12-09 RX ADMIN — ASPIRIN 81 MG: 81 TABLET ORAL at 08:19

## 2017-12-09 RX ADMIN — ALPRAZOLAM 0.5 MG: 0.5 TABLET ORAL at 17:19

## 2017-12-09 RX ADMIN — BUMETANIDE 1 MG: 1 TABLET ORAL at 08:18

## 2017-12-09 RX ADMIN — CEFTRIAXONE 1 G: 1 INJECTION, POWDER, FOR SOLUTION INTRAMUSCULAR; INTRAVENOUS at 00:05

## 2017-12-09 RX ADMIN — METOPROLOL TARTRATE 50 MG: 50 TABLET, FILM COATED ORAL at 08:18

## 2017-12-09 RX ADMIN — OXYCODONE HYDROCHLORIDE AND ACETAMINOPHEN 1 TABLET: 10; 325 TABLET ORAL at 17:19

## 2017-12-09 RX ADMIN — ALPRAZOLAM 0.5 MG: 0.5 TABLET ORAL at 08:18

## 2017-12-09 RX ADMIN — MIRTAZAPINE 7.5 MG: 15 TABLET, FILM COATED ORAL at 20:07

## 2017-12-09 RX ADMIN — ALPRAZOLAM 0.5 MG: 0.5 TABLET ORAL at 20:07

## 2017-12-09 RX ADMIN — AMITRIPTYLINE HYDROCHLORIDE 100 MG: 50 TABLET, FILM COATED ORAL at 20:07

## 2017-12-09 RX ADMIN — CETIRIZINE HYDROCHLORIDE 10 MG: 10 TABLET ORAL at 08:19

## 2017-12-09 RX ADMIN — QUETIAPINE FUMARATE 300 MG: 300 TABLET, FILM COATED ORAL at 20:07

## 2017-12-09 NOTE — PLAN OF CARE
Problem: Patient Care Overview (Adult)  Goal: Plan of Care Review  Outcome: Ongoing (interventions implemented as appropriate)    12/05/17 1115 12/08/17 2041   Coping/Psychosocial Response Interventions   Plan Of Care Reviewed With --  patient   Patient Care Overview   Progress progress toward functional goals as expected --        Goal: Discharge Needs Assessment  Outcome: Ongoing (interventions implemented as appropriate)    12/05/17 1154   Discharge Needs Assessment   Concerns To Be Addressed no discharge needs identified  (States she has no needs. Lives in Assisted Living and her sons are very suportive.)   Readmission Within The Last 30 Days no previous admission in last 30 days   Equipment Needed After Discharge none  (Denies equipment needs.)   Discharge Disposition still a patient   Current Health   Outpatient/Agency/Support Group Needs (Denies outpatient needs.)   Living Environment   Transportation Available car  (Her 2 sons, Arnold and Hoang provide transpportation and will provide discharge transportation.)   Self-Care   Equipment Currently Used at Home none         Problem: Sepsis (Adult)  Goal: Signs and Symptoms of Listed Potential Problems Will be Absent or Manageable (Sepsis)  Outcome: Ongoing (interventions implemented as appropriate)    12/05/17 1115   Sepsis   Problems Assessed (Sepsis) all   Problems Present (Sepsis) none         Problem: Fall Risk (Adult)  Goal: Identify Related Risk Factors and Signs and Symptoms  Outcome: Ongoing (interventions implemented as appropriate)    12/05/17 1115   Fall Risk   Fall Risk: Related Risk Factors age-related changes;gait/mobility problems   Fall Risk: Signs and Symptoms presence of risk factors       Goal: Absence of Falls  Outcome: Ongoing (interventions implemented as appropriate)    12/08/17 2130   Fall Risk (Adult)   Absence of Falls making progress toward outcome         Problem: Infection, Risk/Actual (Adult)  Goal: Identify Related Risk Factors and  Signs and Symptoms  Outcome: Ongoing (interventions implemented as appropriate)    12/05/17 1115   Infection, Risk/Actual   Signs and Symptoms (Infection, Risk/Actual) weakness       Goal: Infection Prevention/Resolution  Outcome: Ongoing (interventions implemented as appropriate)    12/08/17 2130   Infection, Risk/Actual (Adult)   Infection Prevention/Resolution making progress toward outcome

## 2017-12-09 NOTE — NURSING NOTE
Patient ambulated in hallway around nursing station two times on room air. When returning back to room patients oxygen saturation was 95% on room air. Oxygen left off at this time. Patient is in no distress at this time. Patient is sitting in the chair at this time.

## 2017-12-09 NOTE — PROGRESS NOTES
Indigo Sorenson       LOS: 4 days   Patient Care Team:  Madi Tidwell MD as PCP - General  Madi Tidwell MD as PCP - Family Medicine  Madi Tidwell MD as PCP - Claims Attributed      Subjective     Interval History:     Patient Complaints: Patient complains having a difficult night with increasing back pain , appears ill  Patient Denies:  Fever nausea vomiting  History taken from: patient    Review of Systems:    All systems were reviewed and negative     Objective     Vital Signs  Vital Signs (last 72 hrs)       12/06 0700  -  12/07 0659 12/07 0700  -  12/08 0659 12/08 0700  -  12/09 0659 12/09 0700  -  12/09 0801   Most Recent    Temp (°F) 97.5 -  97.9    97.6 -  98    97.3 -  98.5       97.5 (36.4)    Heart Rate 77 -  83    78 -  91    74 -  83       74    Resp 16 -  20    18 -  20      20       20    /52 -  138/66    116/56 -  148/68    102/53 -  128/64       102/53    SpO2 (%) 93 -  98    94 -  97    97 -  98       97          Intake & Output (last 3 days)       12/06 0701 - 12/07 0700 12/07 0701 - 12/08 0700 12/08 0701 - 12/09 0700 12/09 0701 - 12/10 0700    P.O. 1060 1560 2040     IV Piggyback  100      Total Intake(mL/kg) 1060 (11.5) 1660 (19.5) 2040 (23.1)     Urine (mL/kg/hr)  1900 (0.9) 2600 (1.2)     Stool  0 (0) 0 (0)     Total Output   1900 2600      Net +1060 -240 -560              Unmeasured Urine Occurrence 7 x 4 x      Unmeasured Stool Occurrence  2 x 2 x           Physical Exam:     General Appearance:    Alert, cooperative, in no acute distress   Head:    Normocephalic, without obvious abnormality, atraumatic   Eyes:            Lids and lashes normal, conjunctivae and sclerae normal, no   icterus, no pallor, corneas clear, PERRLA   Ears:    Ears appear intact with no abnormalities noted   Throat:   No oral lesions, no thrush, oral mucosa moist   Neck:   No adenopathy, supple, trachea midline, no thyromegaly, no     carotid bruit, no JVD   Back:     No kyphosis present, no scoliosis  present, no skin lesions,       erythema or scars, no tenderness to percussion or                   palpation,   range of motion normal   Lungs:     Clear to auscultation,respirations regular, even and                   unlabored    Heart:    Regular rhythm and normal rate, normal S1 and S2, no            murmur, no gallop, no rub, no click   Breast Exam:    Deferred   Abdomen:     Normal bowel sounds, no masses, no organomegaly, soft        non-tender, non-distended, no guarding, no rebound                 tenderness   Genitalia:    Deferred   Extremities:   Moves all extremities well, no edema, no cyanosis, no              redness   Pulses:   Pulses palpable and equal bilaterally   Skin:   No bleeding, bruising or rash   Lymph nodes:   No palpable adenopathy   Neurologic:   Cranial nerves 2 - 12 grossly intact, sensation intact, DTR        present and equal bilaterally     Lab Results (last 24 hours)     Procedure Component Value Units Date/Time    Blood Culture - Blood, [[id number]]  (Normal) Collected:  12/05/17 0051    Specimen:  Blood from Arm, Left Updated:  12/09/17 0101     Blood Culture No growth at 4 days    Blood Culture - Blood, [[id number]]  (Normal) Collected:  12/05/17 0141    Specimen:  Blood from Arm, Left Updated:  12/09/17 0146     Blood Culture No growth at 4 days    CBC & Differential [[id number]] Collected:  12/09/17 0220    Specimen:  Blood Updated:  12/09/17 0229    Narrative:       The following orders were created for panel order CBC & Differential.  Procedure                               Abnormality         Status                     ---------                               -----------         ------                     CBC Auto Differential[[id number]]        Abnormal            Final result                 Please view results for these tests on the individual orders.    CBC Auto Differential [[id number]]  (Abnormal) Collected:  12/09/17 0220    Specimen:  Blood Updated:  12/09/17 0229      WBC 9.59 10*3/mm3      RBC 3.88 (L) 10*6/mm3      Hemoglobin 10.7 (L) g/dL      Hematocrit 33.9 (L) %      MCV 87.4 fL      MCH 27.6 pg      MCHC 31.6 (L) g/dL      RDW 14.5 %      RDW-SD 45.2 fl      MPV 11.2 (H) fL      Platelets 225 10*3/mm3      Neutrophil % 53.6 %      Lymphocyte % 30.8 %      Monocyte % 6.8 %      Eosinophil % 6.3 %      Basophil % 0.5 %      Immature Grans % 2.0 (H) %      Neutrophils, Absolute 5.15 10*3/mm3      Lymphocytes, Absolute 2.95 10*3/mm3      Monocytes, Absolute 0.65 10*3/mm3      Eosinophils, Absolute 0.60 10*3/mm3      Basophils, Absolute 0.05 10*3/mm3      Immature Grans, Absolute 0.19 (H) 10*3/mm3     Basic Metabolic Panel [[id number]]  (Abnormal) Collected:  12/09/17 0220    Specimen:  Blood Updated:  12/09/17 0303     Glucose 113 (H) mg/dL      BUN 10 mg/dL      Creatinine 0.60 mg/dL      Sodium 142 mmol/L      Potassium 3.8 mmol/L      Chloride 105 mmol/L      CO2 30.4 mmol/L      Calcium 8.5 mg/dL      eGFR Non African Amer 98 mL/min/1.73      BUN/Creatinine Ratio 16.7     Anion Gap 6.6 mmol/L     Narrative:       The MDRD GFR formula is only valid for adults with stable renal function between ages 18 and 70.    Osmolality, Calculated [[id number]]  (Normal) Collected:  12/09/17 0220    Specimen:  Blood Updated:  12/09/17 0303     Osmolality Calc 283.0 mOsm/kg         Imaging Results (last 24 hours)     ** No results found for the last 24 hours. **        Hospital Medications (active)       Dose Frequency Start End    ALPRAZolam (XANAX) tablet 0.5 mg 0.5 mg 4 Times Daily 12/5/2017     Sig - Route: Take 1 tablet by mouth 4 (Four) Times a Day. - Oral    amitriptyline (ELAVIL) tablet 100 mg 100 mg Nightly 12/5/2017     Sig - Route: Take 2 tablets by mouth Every Night. - Oral    aspirin EC tablet 81 mg 81 mg Daily 12/5/2017     Sig - Route: Take 1 tablet by mouth Daily. - Oral    bumetanide (BUMEX) tablet 1 mg 1 mg Daily 12/6/2017     Sig - Route: Take 1 tablet by mouth Daily. -  Oral    cefTRIAXone (ROCEPHIN) 1 g/100 mL 0.9% NS (MBP) 1 g Every 24 Hours 12/5/2017 12/12/2017    Sig - Route: Infuse 100 mL into a venous catheter Daily. - Intravenous    cetirizine (zyrTEC) tablet 10 mg 10 mg Daily 12/5/2017     Sig - Route: Take 1 tablet by mouth Daily. - Oral    levothyroxine (SYNTHROID, LEVOTHROID) tablet 125 mcg 125 mcg Every Early Morning 12/5/2017     Sig - Route: Take 1 tablet by mouth Every Morning. - Oral    metoprolol tartrate (LOPRESSOR) tablet 50 mg 50 mg Every 12 Hours Scheduled 12/5/2017     Sig - Route: Take 1 tablet by mouth Every 12 (Twelve) Hours. - Oral    mirtazapine (REMERON) tablet 7.5 mg 7.5 mg Nightly 12/5/2017     Sig - Route: Take 0.5 tablets by mouth Every Night. - Oral    ondansetron (ZOFRAN) tablet 4 mg 4 mg Every 8 Hours PRN 12/5/2017     Sig - Route: Take 1 tablet by mouth Every 8 (Eight) Hours As Needed for Nausea or Vomiting. - Oral    oxyCODONE-acetaminophen (PERCOCET)  MG per tablet 1 tablet 1 tablet 4 Times Daily PRN 12/5/2017     Sig - Route: Take 1 tablet by mouth 4 (Four) Times a Day As Needed for Moderate Pain . - Oral    pantoprazole (PROTONIX) EC tablet 40 mg 40 mg Every Morning 12/5/2017     Sig - Route: Take 1 tablet by mouth Every Morning. - Oral    potassium chloride (K-DUR,KLOR-CON) CR tablet 40 mEq 40 mEq Once 12/8/2017 12/8/2017    Sig - Route: Take 2 tablets by mouth 1 (One) Time. - Oral    QUEtiapine (SEROquel) tablet 300 mg 300 mg Nightly 12/5/2017     Sig - Route: Take 1 tablet by mouth Every Night. - Oral    Risaquad-2 capsule 1 capsule 1 capsule Daily 12/5/2017     Sig - Route: Take 1 capsule by mouth Daily. - Oral    rOPINIRole (REQUIP) tablet 1 mg 1 mg Nightly 12/5/2017     Sig - Route: Take 1 tablet by mouth Every Night. - Oral    sodium chloride 0.9 % flush 10 mL 10 mL As Needed 12/4/2017     Sig - Route: Infuse 10 mL into a venous catheter As Needed for Line Care. - Intravenous    Cosign for Ordering: Accepted by Rohan CALLAHAN  "MD Sara on 12/5/2017 12:42 AM    Linked Group 1:  \"And\" Linked Group Details               Results Review:     I reviewed the patient's new clinical results.    Medications Reviewed    Assessment/Plan   1.  Sepsis   2.  Acute bronchitis  3.  UTI Escherichia coli  4.  Hypokalemia  5.  Hypertension  We'll continue with IV fluids and replacing her potassium continue evaluation follow of her mid back pain  Active Problems:    Sepsis              FINA Tan  12/09/17  8:01 AM    "

## 2017-12-09 NOTE — PLAN OF CARE
Problem: Patient Care Overview (Adult)  Goal: Plan of Care Review  Outcome: Ongoing (interventions implemented as appropriate)    12/05/17 1115 12/09/17 0844   Coping/Psychosocial Response Interventions   Plan Of Care Reviewed With --  patient   Patient Care Overview   Progress progress toward functional goals as expected --          12/05/17 1115 12/09/17 0844 12/09/17 0852   Coping/Psychosocial Response Interventions   Plan Of Care Reviewed With --  patient --    Patient Care Overview   Progress progress toward functional goals as expected --  --    Outcome Evaluation   Outcome Summary/Follow up Plan --  --  Patient may be discharged tomorrow.          Problem: Sepsis (Adult)  Goal: Signs and Symptoms of Listed Potential Problems Will be Absent or Manageable (Sepsis)  Outcome: Ongoing (interventions implemented as appropriate)    Problem: Fall Risk (Adult)  Goal: Identify Related Risk Factors and Signs and Symptoms  Outcome: Ongoing (interventions implemented as appropriate)  Goal: Absence of Falls  Outcome: Ongoing (interventions implemented as appropriate)    Problem: Infection, Risk/Actual (Adult)  Goal: Identify Related Risk Factors and Signs and Symptoms  Outcome: Ongoing (interventions implemented as appropriate)  Goal: Infection Prevention/Resolution  Outcome: Ongoing (interventions implemented as appropriate)

## 2017-12-10 ENCOUNTER — APPOINTMENT (OUTPATIENT)
Dept: GENERAL RADIOLOGY | Facility: HOSPITAL | Age: 72
End: 2017-12-10

## 2017-12-10 LAB
ANION GAP SERPL CALCULATED.3IONS-SCNC: 4.9 MMOL/L (ref 3.6–11.2)
BACTERIA SPEC AEROBE CULT: NORMAL
BACTERIA SPEC AEROBE CULT: NORMAL
BASOPHILS # BLD AUTO: 0.04 10*3/MM3 (ref 0–0.3)
BASOPHILS NFR BLD AUTO: 0.4 % (ref 0–2)
BUN BLD-MCNC: 12 MG/DL (ref 7–21)
BUN/CREAT SERPL: 16.4 (ref 7–25)
CALCIUM SPEC-SCNC: 8.7 MG/DL (ref 7.7–10)
CHLORIDE SERPL-SCNC: 104 MMOL/L (ref 99–112)
CO2 SERPL-SCNC: 31.1 MMOL/L (ref 24.3–31.9)
CREAT BLD-MCNC: 0.73 MG/DL (ref 0.43–1.29)
CRP SERPL-MCNC: 3.37 MG/DL (ref 0–0.99)
DEPRECATED RDW RBC AUTO: 45.8 FL (ref 37–54)
EOSINOPHIL # BLD AUTO: 0.51 10*3/MM3 (ref 0–0.7)
EOSINOPHIL NFR BLD AUTO: 5 % (ref 0–7)
ERYTHROCYTE [DISTWIDTH] IN BLOOD BY AUTOMATED COUNT: 14.6 % (ref 11.5–14.5)
GFR SERPL CREATININE-BSD FRML MDRD: 78 ML/MIN/1.73
GLUCOSE BLD-MCNC: 107 MG/DL (ref 70–110)
HCT VFR BLD AUTO: 33.7 % (ref 37–47)
HGB BLD-MCNC: 10.5 G/DL (ref 12–16)
IMM GRANULOCYTES # BLD: 0.28 10*3/MM3 (ref 0–0.03)
IMM GRANULOCYTES NFR BLD: 2.7 % (ref 0–0.5)
LYMPHOCYTES # BLD AUTO: 2.99 10*3/MM3 (ref 1–3)
LYMPHOCYTES NFR BLD AUTO: 29.2 % (ref 16–46)
MCH RBC QN AUTO: 27.6 PG (ref 27–33)
MCHC RBC AUTO-ENTMCNC: 31.2 G/DL (ref 33–37)
MCV RBC AUTO: 88.5 FL (ref 80–94)
MONOCYTES # BLD AUTO: 0.71 10*3/MM3 (ref 0.1–0.9)
MONOCYTES NFR BLD AUTO: 6.9 % (ref 0–12)
NEUTROPHILS # BLD AUTO: 5.71 10*3/MM3 (ref 1.4–6.5)
NEUTROPHILS NFR BLD AUTO: 55.8 % (ref 40–75)
OSMOLALITY SERPL CALC.SUM OF ELEC: 279.6 MOSM/KG (ref 273–305)
PLATELET # BLD AUTO: 210 10*3/MM3 (ref 130–400)
PMV BLD AUTO: 11.6 FL (ref 6–10)
POTASSIUM BLD-SCNC: 3.8 MMOL/L (ref 3.5–5.3)
RBC # BLD AUTO: 3.81 10*6/MM3 (ref 4.2–5.4)
SODIUM BLD-SCNC: 140 MMOL/L (ref 135–153)
WBC NRBC COR # BLD: 10.24 10*3/MM3 (ref 4.5–12.5)

## 2017-12-10 PROCEDURE — 94799 UNLISTED PULMONARY SVC/PX: CPT

## 2017-12-10 PROCEDURE — 85025 COMPLETE CBC W/AUTO DIFF WBC: CPT | Performed by: PHYSICIAN ASSISTANT

## 2017-12-10 PROCEDURE — 25010000002 CEFTRIAXONE: Performed by: FAMILY MEDICINE

## 2017-12-10 PROCEDURE — 71010 HC CHEST PA OR AP: CPT

## 2017-12-10 PROCEDURE — 71010 XR CHEST 1 VW: CPT | Performed by: RADIOLOGY

## 2017-12-10 PROCEDURE — 86140 C-REACTIVE PROTEIN: CPT | Performed by: PHYSICIAN ASSISTANT

## 2017-12-10 PROCEDURE — 80048 BASIC METABOLIC PNL TOTAL CA: CPT | Performed by: PHYSICIAN ASSISTANT

## 2017-12-10 RX ADMIN — ROPINIROLE HYDROCHLORIDE 1 MG: 1 TABLET, FILM COATED ORAL at 20:22

## 2017-12-10 RX ADMIN — BENZONATATE 200 MG: 100 CAPSULE ORAL at 20:26

## 2017-12-10 RX ADMIN — Medication 1 CAPSULE: at 08:06

## 2017-12-10 RX ADMIN — MIRTAZAPINE 7.5 MG: 15 TABLET, FILM COATED ORAL at 20:22

## 2017-12-10 RX ADMIN — ALPRAZOLAM 0.5 MG: 0.5 TABLET ORAL at 17:34

## 2017-12-10 RX ADMIN — OXYCODONE HYDROCHLORIDE AND ACETAMINOPHEN 1 TABLET: 10; 325 TABLET ORAL at 03:09

## 2017-12-10 RX ADMIN — LEVOTHYROXINE SODIUM 125 MCG: 0.12 TABLET ORAL at 05:17

## 2017-12-10 RX ADMIN — QUETIAPINE FUMARATE 300 MG: 300 TABLET, FILM COATED ORAL at 20:22

## 2017-12-10 RX ADMIN — ASPIRIN 81 MG: 81 TABLET ORAL at 08:06

## 2017-12-10 RX ADMIN — BUMETANIDE 1 MG: 1 TABLET ORAL at 08:06

## 2017-12-10 RX ADMIN — METOPROLOL TARTRATE 50 MG: 50 TABLET, FILM COATED ORAL at 08:06

## 2017-12-10 RX ADMIN — PANTOPRAZOLE SODIUM 40 MG: 40 TABLET, DELAYED RELEASE ORAL at 05:17

## 2017-12-10 RX ADMIN — METOPROLOL TARTRATE 50 MG: 50 TABLET, FILM COATED ORAL at 20:21

## 2017-12-10 RX ADMIN — AMITRIPTYLINE HYDROCHLORIDE 100 MG: 50 TABLET, FILM COATED ORAL at 20:22

## 2017-12-10 RX ADMIN — IPRATROPIUM BROMIDE AND ALBUTEROL SULFATE 3 ML: .5; 3 SOLUTION RESPIRATORY (INHALATION) at 07:17

## 2017-12-10 RX ADMIN — LIDOCAINE 1 PATCH: 50 PATCH CUTANEOUS at 08:07

## 2017-12-10 RX ADMIN — CETIRIZINE HYDROCHLORIDE 10 MG: 10 TABLET ORAL at 08:06

## 2017-12-10 RX ADMIN — CEFTRIAXONE 1 G: 1 INJECTION, POWDER, FOR SOLUTION INTRAMUSCULAR; INTRAVENOUS at 20:23

## 2017-12-10 RX ADMIN — ALPRAZOLAM 0.5 MG: 0.5 TABLET ORAL at 11:49

## 2017-12-10 RX ADMIN — IPRATROPIUM BROMIDE AND ALBUTEROL SULFATE 3 ML: .5; 3 SOLUTION RESPIRATORY (INHALATION) at 13:26

## 2017-12-10 RX ADMIN — BENZONATATE 200 MG: 100 CAPSULE ORAL at 08:06

## 2017-12-10 RX ADMIN — ALPRAZOLAM 0.5 MG: 0.5 TABLET ORAL at 08:06

## 2017-12-10 RX ADMIN — OXYCODONE HYDROCHLORIDE AND ACETAMINOPHEN 1 TABLET: 10; 325 TABLET ORAL at 11:49

## 2017-12-10 RX ADMIN — ALPRAZOLAM 0.5 MG: 0.5 TABLET ORAL at 20:22

## 2017-12-10 RX ADMIN — IPRATROPIUM BROMIDE AND ALBUTEROL SULFATE 3 ML: .5; 3 SOLUTION RESPIRATORY (INHALATION) at 18:35

## 2017-12-10 RX ADMIN — OXYCODONE HYDROCHLORIDE AND ACETAMINOPHEN 1 TABLET: 10; 325 TABLET ORAL at 17:41

## 2017-12-10 NOTE — PLAN OF CARE
Problem: Patient Care Overview (Adult)  Goal: Plan of Care Review  Outcome: Ongoing (interventions implemented as appropriate)    12/05/17 1115 12/09/17 0852 12/09/17 2008   Coping/Psychosocial Response Interventions   Plan Of Care Reviewed With --  --  patient   Patient Care Overview   Progress progress toward functional goals as expected --  --    Outcome Evaluation   Outcome Summary/Follow up Plan --  Patient may be discharged tomorrow.  --        Goal: Discharge Needs Assessment  Outcome: Ongoing (interventions implemented as appropriate)    12/05/17 1154   Discharge Needs Assessment   Concerns To Be Addressed no discharge needs identified  (States she has no needs. Lives in Assisted Living and her sons are very suportive.)   Readmission Within The Last 30 Days no previous admission in last 30 days   Equipment Needed After Discharge none  (Denies equipment needs.)   Discharge Disposition still a patient   Current Health   Outpatient/Agency/Support Group Needs (Denies outpatient needs.)   Living Environment   Transportation Available car  (Her 2 sons, Arnold and Hoang provide transpportation and will provide discharge transportation.)   Self-Care   Equipment Currently Used at Home none         Problem: Sepsis (Adult)  Goal: Signs and Symptoms of Listed Potential Problems Will be Absent or Manageable (Sepsis)  Outcome: Ongoing (interventions implemented as appropriate)    12/05/17 1115   Sepsis   Problems Assessed (Sepsis) all   Problems Present (Sepsis) none         Problem: Fall Risk (Adult)  Goal: Identify Related Risk Factors and Signs and Symptoms  Outcome: Ongoing (interventions implemented as appropriate)    12/05/17 1115   Fall Risk   Fall Risk: Related Risk Factors age-related changes;gait/mobility problems   Fall Risk: Signs and Symptoms presence of risk factors       Goal: Absence of Falls  Outcome: Ongoing (interventions implemented as appropriate)    12/09/17 2116   Fall Risk (Adult)   Absence of Falls  making progress toward outcome         Problem: Infection, Risk/Actual (Adult)  Goal: Identify Related Risk Factors and Signs and Symptoms  Outcome: Ongoing (interventions implemented as appropriate)    12/05/17 1115 12/09/17 0852   Infection, Risk/Actual   Infection, Risk/Actual: Related Risk Factors --  prolonged hospitalization   Signs and Symptoms (Infection, Risk/Actual) weakness --        Goal: Infection Prevention/Resolution  Outcome: Ongoing (interventions implemented as appropriate)    12/09/17 2116   Infection, Risk/Actual (Adult)   Infection Prevention/Resolution making progress toward outcome

## 2017-12-10 NOTE — PROGRESS NOTES
[facility]       LOS: 5 days   Patient Care Team:  Madi Tidwell MD as PCP - General  Madi Tidwell MD as PCP - Family Medicine  Madi Tidwell MD as PCP - Claims Attributed      Subjective     Interval History:     Patient Complaints: Patient seems to be improving today is complained of wheezing overnight  Patient Denies:    History taken from: patient    Review of Systems:    All systems were reviewed and negative     Objective     Vital Signs  Vital Signs (last 72 hrs)       12/07 0700  -  12/08 0659 12/08 0700  -  12/09 0659 12/09 0700  -  12/10 0659 12/10 0700  -  12/10 0737   Most Recent    Temp (°F) 97.6 -  98    97.3 -  98.5    97.6 -  98.3       97.9 (36.6)    Heart Rate 78 -  91    74 -  83    73 -  82      82     82    Resp 18 -  20      20    16 -  20      18     18    /56 -  148/68    102/53 -  128/64    103/56 -  124/52       120/64    SpO2 (%) 94 -  97    97 -  98    94 -  98      92     92          Intake & Output (last 3 days)       12/07 0701 - 12/08 0700 12/08 0701 - 12/09 0700 12/09 0701 - 12/10 0700 12/10 0701 - 12/11 0700    P.O. 1560 2040 240     IV Piggyback 100       Total Intake(mL/kg) 1660 (19.5) 2040 (23.1) 240 (2.6)     Urine (mL/kg/hr) 1900 (0.9) 2600 (1.2) 2800 (1.3)     Stool 0 (0) 0 (0)      Total Output 1900 2600 2800      Net -240 -560 -2560              Unmeasured Urine Occurrence 4 x       Unmeasured Stool Occurrence 2 x 2 x            Physical Exam:     General Appearance:    Alert, cooperative, in no acute distress   Head:    Normocephalic, without obvious abnormality, atraumatic   Eyes:            Lids and lashes normal, conjunctivae and sclerae normal, no   icterus, no pallor, corneas clear, PERRLA   Ears:    Ears appear intact with no abnormalities noted   Throat:   No oral lesions, no thrush, oral mucosa moist   Neck:   No adenopathy, supple, trachea midline, no thyromegaly, no     carotid bruit, no JVD   Back:     No kyphosis present, no scoliosis present,  no skin lesions,       erythema or scars, no tenderness to percussion or                   palpation,   range of motion normal   Lungs:     Clear to auscultation,respirations regular, even and                   unlabored    Heart:    Regular rhythm and normal rate, normal S1 and S2, no            murmur, no gallop, no rub, no click   Breast Exam:    Deferred   Abdomen:     Normal bowel sounds, no masses, no organomegaly, soft        non-tender, non-distended, no guarding, no rebound                 tenderness   Genitalia:    Deferred   Extremities:   Moves all extremities well, no edema, no cyanosis, no              redness   Pulses:   Pulses palpable and equal bilaterally   Skin:   No bleeding, bruising or rash   Lymph nodes:   No palpable adenopathy   Neurologic:   Cranial nerves 2 - 12 grossly intact, sensation intact, DTR        present and equal bilaterally     Lab Results (last 24 hours)     Procedure Component Value Units Date/Time    Blood Culture - Blood, [[id number]]  (Normal) Collected:  12/05/17 0051    Specimen:  Blood from Arm, Left Updated:  12/10/17 0101     Blood Culture No growth at 5 days    Blood Culture - Blood, [[id number]]  (Normal) Collected:  12/05/17 0141    Specimen:  Blood from Arm, Left Updated:  12/10/17 0146     Blood Culture No growth at 5 days    CBC & Differential [[id number]] Collected:  12/10/17 0135    Specimen:  Blood Updated:  12/10/17 0217    Narrative:       The following orders were created for panel order CBC & Differential.  Procedure                               Abnormality         Status                     ---------                               -----------         ------                     CBC Auto Differential[[id number]]        Abnormal            Final result                 Please view results for these tests on the individual orders.    CBC Auto Differential [[id number]]  (Abnormal) Collected:  12/10/17 0135    Specimen:  Blood Updated:  12/10/17 0217     WBC 10.24  10*3/mm3      RBC 3.81 (L) 10*6/mm3      Hemoglobin 10.5 (L) g/dL      Hematocrit 33.7 (L) %      MCV 88.5 fL      MCH 27.6 pg      MCHC 31.2 (L) g/dL      RDW 14.6 (H) %      RDW-SD 45.8 fl      MPV 11.6 (H) fL      Platelets 210 10*3/mm3      Neutrophil % 55.8 %      Lymphocyte % 29.2 %      Monocyte % 6.9 %      Eosinophil % 5.0 %      Basophil % 0.4 %      Immature Grans % 2.7 (H) %      Neutrophils, Absolute 5.71 10*3/mm3      Lymphocytes, Absolute 2.99 10*3/mm3      Monocytes, Absolute 0.71 10*3/mm3      Eosinophils, Absolute 0.51 10*3/mm3      Basophils, Absolute 0.04 10*3/mm3      Immature Grans, Absolute 0.28 (H) 10*3/mm3     Basic Metabolic Panel [[id number]]  (Normal) Collected:  12/10/17 0135    Specimen:  Blood Updated:  12/10/17 0241     Glucose 107 mg/dL      BUN 12 mg/dL      Creatinine 0.73 mg/dL      Sodium 140 mmol/L      Potassium 3.8 mmol/L      Chloride 104 mmol/L      CO2 31.1 mmol/L      Calcium 8.7 mg/dL      eGFR Non African Amer 78 mL/min/1.73      BUN/Creatinine Ratio 16.4     Anion Gap 4.9 mmol/L     Narrative:       The MDRD GFR formula is only valid for adults with stable renal function between ages 18 and 70.    C-reactive Protein [[id number]]  (Abnormal) Collected:  12/10/17 0135    Specimen:  Blood Updated:  12/10/17 0241     C-Reactive Protein 3.37 (H) mg/dL     Osmolality, Calculated [[id number]]  (Normal) Collected:  12/10/17 0135    Specimen:  Blood Updated:  12/10/17 0241     Osmolality Calc 279.6 mOsm/kg         Imaging Results (last 24 hours)     ** No results found for the last 24 hours. **        Hospital Medications (active)       Dose Frequency Start End    ALPRAZolam (XANAX) tablet 0.5 mg 0.5 mg 4 Times Daily 12/5/2017     Sig - Route: Take 1 tablet by mouth 4 (Four) Times a Day. - Oral    amitriptyline (ELAVIL) tablet 100 mg 100 mg Nightly 12/5/2017     Sig - Route: Take 2 tablets by mouth Every Night. - Oral    aspirin EC tablet 81 mg 81 mg Daily 12/5/2017     Sig -  Route: Take 1 tablet by mouth Daily. - Oral    benzonatate (TESSALON) capsule 200 mg 200 mg 3 Times Daily PRN 12/9/2017     Sig - Route: Take 2 capsules by mouth 3 (Three) Times a Day As Needed for Cough. - Oral    bumetanide (BUMEX) tablet 1 mg 1 mg Daily 12/6/2017     Sig - Route: Take 1 tablet by mouth Daily. - Oral    cefTRIAXone (ROCEPHIN) 1 g/100 mL 0.9% NS (MBP) 1 g Every 24 Hours 12/5/2017 12/12/2017    Sig - Route: Infuse 100 mL into a venous catheter Daily. - Intravenous    cetirizine (zyrTEC) tablet 10 mg 10 mg Daily 12/5/2017     Sig - Route: Take 1 tablet by mouth Daily. - Oral    ipratropium-albuterol (DUO-NEB) nebulizer solution 3 mL 3 mL Every 6 Hours PRN 12/9/2017     Sig - Route: Take 3 mL by nebulization Every 6 (Six) Hours As Needed for Shortness of Air. - Nebulization    levothyroxine (SYNTHROID, LEVOTHROID) tablet 125 mcg 125 mcg Every Early Morning 12/5/2017     Sig - Route: Take 1 tablet by mouth Every Morning. - Oral    lidocaine (LIDODERM) 5 % 1 patch 1 patch Every 24 Hours Scheduled 12/9/2017     Sig - Route: Place 1 patch on the skin Daily. - Transdermal    metoprolol tartrate (LOPRESSOR) tablet 50 mg 50 mg Every 12 Hours Scheduled 12/5/2017     Sig - Route: Take 1 tablet by mouth Every 12 (Twelve) Hours. - Oral    mirtazapine (REMERON) tablet 7.5 mg 7.5 mg Nightly 12/5/2017     Sig - Route: Take 0.5 tablets by mouth Every Night. - Oral    ondansetron (ZOFRAN) tablet 4 mg 4 mg Every 8 Hours PRN 12/5/2017     Sig - Route: Take 1 tablet by mouth Every 8 (Eight) Hours As Needed for Nausea or Vomiting. - Oral    oxyCODONE-acetaminophen (PERCOCET)  MG per tablet 1 tablet 1 tablet 4 Times Daily PRN 12/5/2017     Sig - Route: Take 1 tablet by mouth 4 (Four) Times a Day As Needed for Moderate Pain . - Oral    pantoprazole (PROTONIX) EC tablet 40 mg 40 mg Every Morning 12/5/2017     Sig - Route: Take 1 tablet by mouth Every Morning. - Oral    QUEtiapine (SEROquel) tablet 300 mg 300 mg  "Nightly 12/5/2017     Sig - Route: Take 1 tablet by mouth Every Night. - Oral    Risaquad-2 capsule 1 capsule 1 capsule Daily 12/5/2017     Sig - Route: Take 1 capsule by mouth Daily. - Oral    rOPINIRole (REQUIP) tablet 1 mg 1 mg Nightly 12/5/2017     Sig - Route: Take 1 tablet by mouth Every Night. - Oral    sodium chloride 0.9 % flush 10 mL 10 mL As Needed 12/4/2017     Sig - Route: Infuse 10 mL into a venous catheter As Needed for Line Care. - Intravenous    Cosign for Ordering: Accepted by Rohan Ruiz MD on 12/5/2017 12:42 AM    Linked Group 1:  \"And\" Linked Group Details               Results Review:     I reviewed the patient's new clinical results.    Medications Reviewed    Assessment/Plan   1.  Sepsis resolved  2.  Acute bronchitis  Continues with some wheezing complaints we'll go ahead and recheck a portable chest x-ray today if okay we'll plan on discharge in the morning  3.  UTI/Escherichia coli  4.  Hypokalemia  5.  Hypertension  Patient continues on IV fluids continues on IV antibiotics so continues on potassium replacement complaining of some wheezing overnight we'll recheck her portable chest x-ray she is resident of the [facility] [address] [facility] will be going home alone will plan on discharge tomorrow  Active Problems:    Sepsis              FINA Tan  12/10/17  7:37 AM    "

## 2017-12-10 NOTE — PLAN OF CARE
Problem: Sepsis (Adult)  Goal: Signs and Symptoms of Listed Potential Problems Will be Absent or Manageable (Sepsis)  Outcome: Ongoing (interventions implemented as appropriate)    12/05/17 1115   Sepsis   Problems Assessed (Sepsis) all   Problems Present (Sepsis) none         Problem: Fall Risk (Adult)  Goal: Identify Related Risk Factors and Signs and Symptoms  Outcome: Ongoing (interventions implemented as appropriate)    12/05/17 1115   Fall Risk   Fall Risk: Related Risk Factors age-related changes;gait/mobility problems   Fall Risk: Signs and Symptoms presence of risk factors       Goal: Absence of Falls  Outcome: Ongoing (interventions implemented as appropriate)    12/09/17 2116   Fall Risk (Adult)   Absence of Falls making progress toward outcome         Problem: Infection, Risk/Actual (Adult)  Goal: Identify Related Risk Factors and Signs and Symptoms  Outcome: Ongoing (interventions implemented as appropriate)    12/05/17 1115 12/09/17 0852   Infection, Risk/Actual   Infection, Risk/Actual: Related Risk Factors --  prolonged hospitalization   Signs and Symptoms (Infection, Risk/Actual) weakness --        Goal: Infection Prevention/Resolution  Outcome: Ongoing (interventions implemented as appropriate)    12/09/17 2116   Infection, Risk/Actual (Adult)   Infection Prevention/Resolution making progress toward outcome

## 2017-12-11 VITALS
TEMPERATURE: 97.6 F | DIASTOLIC BLOOD PRESSURE: 61 MMHG | WEIGHT: 201.1 LBS | SYSTOLIC BLOOD PRESSURE: 111 MMHG | HEART RATE: 80 BPM | OXYGEN SATURATION: 94 % | HEIGHT: 64 IN | RESPIRATION RATE: 18 BRPM | BODY MASS INDEX: 34.33 KG/M2

## 2017-12-11 LAB
A-A DO2: 15.2 MMHG (ref 0–300)
ANION GAP SERPL CALCULATED.3IONS-SCNC: 7.1 MMOL/L (ref 3.6–11.2)
ARTERIAL PATENCY WRIST A: POSITIVE
ATMOSPHERIC PRESS: 727 MMHG
BASE EXCESS BLDA CALC-SCNC: -0.6 MMOL/L
BASOPHILS # BLD AUTO: 0.03 10*3/MM3 (ref 0–0.3)
BASOPHILS NFR BLD AUTO: 0.3 % (ref 0–2)
BDY SITE: ABNORMAL
BODY TEMPERATURE: 98.6 C
BUN BLD-MCNC: 11 MG/DL (ref 7–21)
BUN/CREAT SERPL: 15.9 (ref 7–25)
CALCIUM SPEC-SCNC: 9 MG/DL (ref 7.7–10)
CHLORIDE SERPL-SCNC: 104 MMOL/L (ref 99–112)
CO2 SERPL-SCNC: 28.9 MMOL/L (ref 24.3–31.9)
COHGB MFR BLD: 1 % (ref 0–5)
CREAT BLD-MCNC: 0.69 MG/DL (ref 0.43–1.29)
CRP SERPL-MCNC: 3.48 MG/DL (ref 0–0.99)
DEPRECATED RDW RBC AUTO: 46.4 FL (ref 37–54)
EOSINOPHIL # BLD AUTO: 0.45 10*3/MM3 (ref 0–0.7)
EOSINOPHIL NFR BLD AUTO: 4.8 % (ref 0–7)
ERYTHROCYTE [DISTWIDTH] IN BLOOD BY AUTOMATED COUNT: 14.7 % (ref 11.5–14.5)
GFR SERPL CREATININE-BSD FRML MDRD: 84 ML/MIN/1.73
GLUCOSE BLD-MCNC: 91 MG/DL (ref 70–110)
GLUCOSE BLDC GLUCOMTR-MCNC: 109 MG/DL (ref 70–130)
HCO3 BLDA-SCNC: 24.6 MMOL/L (ref 22–26)
HCT VFR BLD AUTO: 35.5 % (ref 37–47)
HCT VFR BLD CALC: 34 % (ref 37–47)
HGB BLD-MCNC: 11 G/DL (ref 12–16)
HGB BLDA-MCNC: 11.6 G/DL (ref 12–16)
HOROWITZ INDEX BLD+IHG-RTO: 21 %
IMM GRANULOCYTES # BLD: 0.24 10*3/MM3 (ref 0–0.03)
IMM GRANULOCYTES NFR BLD: 2.6 % (ref 0–0.5)
LYMPHOCYTES # BLD AUTO: 2.92 10*3/MM3 (ref 1–3)
LYMPHOCYTES NFR BLD AUTO: 31.5 % (ref 16–46)
MCH RBC QN AUTO: 27.4 PG (ref 27–33)
MCHC RBC AUTO-ENTMCNC: 31 G/DL (ref 33–37)
MCV RBC AUTO: 88.3 FL (ref 80–94)
METHGB BLD QL: 0.3 % (ref 0–3)
MODALITY: ABNORMAL
MONOCYTES # BLD AUTO: 0.73 10*3/MM3 (ref 0.1–0.9)
MONOCYTES NFR BLD AUTO: 7.9 % (ref 0–12)
NEUTROPHILS # BLD AUTO: 4.91 10*3/MM3 (ref 1.4–6.5)
NEUTROPHILS NFR BLD AUTO: 52.9 % (ref 40–75)
OSMOLALITY SERPL CALC.SUM OF ELEC: 278.4 MOSM/KG (ref 273–305)
OXYHGB MFR BLDV: 92.6 % (ref 85–100)
PCO2 BLDA: 42.3 MM HG (ref 35–45)
PH BLDA: 7.38 PH UNITS (ref 7.35–7.45)
PLATELET # BLD AUTO: 184 10*3/MM3 (ref 130–400)
PMV BLD AUTO: 11.6 FL (ref 6–10)
PO2 BLDA: 76.9 MM HG (ref 80–100)
POTASSIUM BLD-SCNC: 3.9 MMOL/L (ref 3.5–5.3)
RBC # BLD AUTO: 4.02 10*6/MM3 (ref 4.2–5.4)
SAO2 % BLDCOA: 93.8 % (ref 90–100)
SODIUM BLD-SCNC: 140 MMOL/L (ref 135–153)
WBC NRBC COR # BLD: 9.28 10*3/MM3 (ref 4.5–12.5)

## 2017-12-11 PROCEDURE — 36600 WITHDRAWAL OF ARTERIAL BLOOD: CPT | Performed by: PHYSICIAN ASSISTANT

## 2017-12-11 PROCEDURE — 85025 COMPLETE CBC W/AUTO DIFF WBC: CPT | Performed by: PHYSICIAN ASSISTANT

## 2017-12-11 PROCEDURE — 86140 C-REACTIVE PROTEIN: CPT | Performed by: PHYSICIAN ASSISTANT

## 2017-12-11 PROCEDURE — 82962 GLUCOSE BLOOD TEST: CPT

## 2017-12-11 PROCEDURE — 80048 BASIC METABOLIC PNL TOTAL CA: CPT | Performed by: PHYSICIAN ASSISTANT

## 2017-12-11 PROCEDURE — 82375 ASSAY CARBOXYHB QUANT: CPT | Performed by: PHYSICIAN ASSISTANT

## 2017-12-11 PROCEDURE — 82805 BLOOD GASES W/O2 SATURATION: CPT | Performed by: PHYSICIAN ASSISTANT

## 2017-12-11 PROCEDURE — 83050 HGB METHEMOGLOBIN QUAN: CPT | Performed by: PHYSICIAN ASSISTANT

## 2017-12-11 PROCEDURE — 94799 UNLISTED PULMONARY SVC/PX: CPT

## 2017-12-11 RX ORDER — CEFDINIR 300 MG/1
300 CAPSULE ORAL 2 TIMES DAILY
Qty: 14 CAPSULE | Refills: 0 | Status: SHIPPED | OUTPATIENT
Start: 2017-12-11 | End: 2018-04-17

## 2017-12-11 RX ADMIN — OXYCODONE HYDROCHLORIDE AND ACETAMINOPHEN 1 TABLET: 10; 325 TABLET ORAL at 08:56

## 2017-12-11 RX ADMIN — BUMETANIDE 1 MG: 1 TABLET ORAL at 08:08

## 2017-12-11 RX ADMIN — ALPRAZOLAM 0.5 MG: 0.5 TABLET ORAL at 11:34

## 2017-12-11 RX ADMIN — ASPIRIN 81 MG: 81 TABLET ORAL at 08:08

## 2017-12-11 RX ADMIN — LIDOCAINE 1 PATCH: 50 PATCH CUTANEOUS at 08:09

## 2017-12-11 RX ADMIN — PANTOPRAZOLE SODIUM 40 MG: 40 TABLET, DELAYED RELEASE ORAL at 05:02

## 2017-12-11 RX ADMIN — ALPRAZOLAM 0.5 MG: 0.5 TABLET ORAL at 08:07

## 2017-12-11 RX ADMIN — CETIRIZINE HYDROCHLORIDE 10 MG: 10 TABLET ORAL at 08:08

## 2017-12-11 RX ADMIN — Medication 1 CAPSULE: at 08:07

## 2017-12-11 RX ADMIN — LEVOTHYROXINE SODIUM 125 MCG: 0.12 TABLET ORAL at 05:02

## 2017-12-11 RX ADMIN — METOPROLOL TARTRATE 50 MG: 50 TABLET, FILM COATED ORAL at 08:08

## 2017-12-11 RX ADMIN — OXYCODONE HYDROCHLORIDE AND ACETAMINOPHEN 1 TABLET: 10; 325 TABLET ORAL at 02:08

## 2017-12-11 NOTE — NURSING NOTE
Attempted to contact patient to inform her that she left her xanax at hospital. No answer. Contacted son Hoang. He stated he had to bring her to a 's appointment tomorrow and they would pick them up then.

## 2017-12-11 NOTE — DISCHARGE SUMMARY
89      Indigo Sorenson  Date of Discharge:  12/11/2017    Discharge Diagnosis: 1.  Sepsis resolved                                         2.  Acute bronchitis improving                                         3.  UTI/Escherichia coli                                         4.  Hypertension    Presenting Problem/History of Present Illness  Sepsis [A41.9]     See dictated H&P  Hospital Course  Patient is a 72 y.o. female presented with bronchitis, UTI, and sepsis.  Patient is admitted to the telemetry floor where she was started on aggressive IV antibiotics.  She was also started on IV fluid hydration.  The patient was closely monitored with labs as well as x-rays.  The patient began to slowly improve and on discharge she was afebrile.  She was tolerating regular diet.  Her chest x-ray was within normal limits and all her labs were improving.  Her C-reactive protein had dropped to 3.4.  The patient had been treated with IV Rocephin.  Her urine culture grew out Escherichia coli sensitive to cephalosporins as well as insulin.  The patient's cough had much improved on discharge.  The patient was and plating without any difficulty.    Procedures Performed         Consults:   Consults     No orders found from 11/5/2017 to 12/5/2017.          Pertinent Test Results   Lab Results (last 72 hours)     Procedure Component Value Units Date/Time    CBC & Differential [[id number]] Collected:  12/09/17 0220    Specimen:  Blood Updated:  12/09/17 0229    Narrative:       The following orders were created for panel order CBC & Differential.  Procedure                               Abnormality         Status                     ---------                               -----------         ------                     CBC Auto Differential[[id number]]        Abnormal            Final result                 Please view results for these tests on the individual orders.    CBC Auto Differential [[id number]]  (Abnormal) Collected:  12/09/17 0220     Specimen:  Blood Updated:  12/09/17 0229     WBC 9.59 10*3/mm3      RBC 3.88 (L) 10*6/mm3      Hemoglobin 10.7 (L) g/dL      Hematocrit 33.9 (L) %      MCV 87.4 fL      MCH 27.6 pg      MCHC 31.6 (L) g/dL      RDW 14.5 %      RDW-SD 45.2 fl      MPV 11.2 (H) fL      Platelets 225 10*3/mm3      Neutrophil % 53.6 %      Lymphocyte % 30.8 %      Monocyte % 6.8 %      Eosinophil % 6.3 %      Basophil % 0.5 %      Immature Grans % 2.0 (H) %      Neutrophils, Absolute 5.15 10*3/mm3      Lymphocytes, Absolute 2.95 10*3/mm3      Monocytes, Absolute 0.65 10*3/mm3      Eosinophils, Absolute 0.60 10*3/mm3      Basophils, Absolute 0.05 10*3/mm3      Immature Grans, Absolute 0.19 (H) 10*3/mm3     Basic Metabolic Panel [[id number]]  (Abnormal) Collected:  12/09/17 0220    Specimen:  Blood Updated:  12/09/17 0303     Glucose 113 (H) mg/dL      BUN 10 mg/dL      Creatinine 0.60 mg/dL      Sodium 142 mmol/L      Potassium 3.8 mmol/L      Chloride 105 mmol/L      CO2 30.4 mmol/L      Calcium 8.5 mg/dL      eGFR Non African Amer 98 mL/min/1.73      BUN/Creatinine Ratio 16.7     Anion Gap 6.6 mmol/L     Narrative:       The MDRD GFR formula is only valid for adults with stable renal function between ages 18 and 70.    Osmolality, Calculated [[id number]]  (Normal) Collected:  12/09/17 0220    Specimen:  Blood Updated:  12/09/17 0303     Osmolality Calc 283.0 mOsm/kg     Blood Culture - Blood, [[id number]]  (Normal) Collected:  12/05/17 0051    Specimen:  Blood from Arm, Left Updated:  12/10/17 0101     Blood Culture No growth at 5 days    Blood Culture - Blood, [[id number]]  (Normal) Collected:  12/05/17 0141    Specimen:  Blood from Arm, Left Updated:  12/10/17 0146     Blood Culture No growth at 5 days    CBC & Differential [[id number]] Collected:  12/10/17 0135    Specimen:  Blood Updated:  12/10/17 0217    Narrative:       The following orders were created for panel order CBC & Differential.  Procedure                                Abnormality         Status                     ---------                               -----------         ------                     CBC Auto Differential[[id number]]        Abnormal            Final result                 Please view results for these tests on the individual orders.    CBC Auto Differential [[id number]]  (Abnormal) Collected:  12/10/17 0135    Specimen:  Blood Updated:  12/10/17 0217     WBC 10.24 10*3/mm3      RBC 3.81 (L) 10*6/mm3      Hemoglobin 10.5 (L) g/dL      Hematocrit 33.7 (L) %      MCV 88.5 fL      MCH 27.6 pg      MCHC 31.2 (L) g/dL      RDW 14.6 (H) %      RDW-SD 45.8 fl      MPV 11.6 (H) fL      Platelets 210 10*3/mm3      Neutrophil % 55.8 %      Lymphocyte % 29.2 %      Monocyte % 6.9 %      Eosinophil % 5.0 %      Basophil % 0.4 %      Immature Grans % 2.7 (H) %      Neutrophils, Absolute 5.71 10*3/mm3      Lymphocytes, Absolute 2.99 10*3/mm3      Monocytes, Absolute 0.71 10*3/mm3      Eosinophils, Absolute 0.51 10*3/mm3      Basophils, Absolute 0.04 10*3/mm3      Immature Grans, Absolute 0.28 (H) 10*3/mm3     Basic Metabolic Panel [[id number]]  (Normal) Collected:  12/10/17 0135    Specimen:  Blood Updated:  12/10/17 0241     Glucose 107 mg/dL      BUN 12 mg/dL      Creatinine 0.73 mg/dL      Sodium 140 mmol/L      Potassium 3.8 mmol/L      Chloride 104 mmol/L      CO2 31.1 mmol/L      Calcium 8.7 mg/dL      eGFR Non African Amer 78 mL/min/1.73      BUN/Creatinine Ratio 16.4     Anion Gap 4.9 mmol/L     Narrative:       The MDRD GFR formula is only valid for adults with stable renal function between ages 18 and 70.    C-reactive Protein [[id number]]  (Abnormal) Collected:  12/10/17 0135    Specimen:  Blood Updated:  12/10/17 0241     C-Reactive Protein 3.37 (H) mg/dL     Osmolality, Calculated [[id number]]  (Normal) Collected:  12/10/17 0135    Specimen:  Blood Updated:  12/10/17 0241     Osmolality Calc 279.6 mOsm/kg     CBC & Differential [[id number]] Collected:  12/11/17 0156     Specimen:  Blood Updated:  12/11/17 0221    Narrative:       The following orders were created for panel order CBC & Differential.  Procedure                               Abnormality         Status                     ---------                               -----------         ------                     CBC Auto Differential[[id number]]        Abnormal            Final result                 Please view results for these tests on the individual orders.    CBC Auto Differential [[id number]]  (Abnormal) Collected:  12/11/17 0156    Specimen:  Blood Updated:  12/11/17 0221     WBC 9.28 10*3/mm3      RBC 4.02 (L) 10*6/mm3      Hemoglobin 11.0 (L) g/dL      Hematocrit 35.5 (L) %      MCV 88.3 fL      MCH 27.4 pg      MCHC 31.0 (L) g/dL      RDW 14.7 (H) %      RDW-SD 46.4 fl      MPV 11.6 (H) fL      Platelets 184 10*3/mm3      Neutrophil % 52.9 %      Lymphocyte % 31.5 %      Monocyte % 7.9 %      Eosinophil % 4.8 %      Basophil % 0.3 %      Immature Grans % 2.6 (H) %      Neutrophils, Absolute 4.91 10*3/mm3      Lymphocytes, Absolute 2.92 10*3/mm3      Monocytes, Absolute 0.73 10*3/mm3      Eosinophils, Absolute 0.45 10*3/mm3      Basophils, Absolute 0.03 10*3/mm3      Immature Grans, Absolute 0.24 (H) 10*3/mm3     Basic Metabolic Panel [[id number]]  (Normal) Collected:  12/11/17 0156    Specimen:  Blood Updated:  12/11/17 0247     Glucose 91 mg/dL      BUN 11 mg/dL      Creatinine 0.69 mg/dL      Sodium 140 mmol/L      Potassium 3.9 mmol/L      Chloride 104 mmol/L      CO2 28.9 mmol/L      Calcium 9.0 mg/dL      eGFR Non African Amer 84 mL/min/1.73      BUN/Creatinine Ratio 15.9     Anion Gap 7.1 mmol/L     Narrative:       The MDRD GFR formula is only valid for adults with stable renal function between ages 18 and 70.    C-reactive Protein [[id number]]  (Abnormal) Collected:  12/11/17 0156    Specimen:  Blood Updated:  12/11/17 0247     C-Reactive Protein 3.48 (H) mg/dL     Osmolality, Calculated [[id number]]  (Normal)  Collected:  12/11/17 0156    Specimen:  Blood Updated:  12/11/17 0247     Osmolality Calc 278.4 mOsm/kg     Blood Gas, Arterial [[id number]]  (Abnormal) Collected:  12/11/17 0431    Specimen:  Arterial Blood Updated:  12/11/17 0452     Site Arterial: right radial     David's Test Positive     pH, Arterial 7.382 pH units      pCO2, Arterial 42.3 mm Hg      pO2, Arterial 76.9 (L) mm Hg      HCO3, Arterial 24.6 mmol/L      Base Excess, Arterial -0.6 mmol/L      O2 Saturation, Arterial 93.8 %      Hemoglobin, Blood Gas 11.6 (L) g/dL      Hematocrit, Blood Gas 34.0 (L) %      Oxyhemoglobin 92.6 %      Methemoglobin 0.30 %      Carboxyhemoglobin 1.0 %      A-a Gradiant 15.2 mmHg      Temperature 98.6 C      Barometric Pressure for Blood Gas 727 mmHg      Modality Room Air     FIO2 21 %         Imaging Results (last 72 hours)     Procedure Component Value Units Date/Time    XR Chest 1 View [[id number]] Collected:  12/10/17 0911     Updated:  12/10/17 0921    Narrative:       EXAMINATION:  XR CHEST 1 VW-      CLINICAL INDICATION:     Wheezing; A41.9-Sepsis, unspecified organism;  N39.0-Urinary tract infection, site not specified; R31.9-Hematuria,  unspecified     TECHNIQUE:  XR CHEST 1 VW-       COMPARISON: 12/4/2017      FINDINGS:   The lungs remain aerated.   Heart size is stable.   No pneumothorax.   No pleural effusion.   Bony and soft tissue structures are unremarkable.            Impression:       Stable radiographic appearance of the chest.     This report was finalized on 12/10/2017 9:11 AM by Dr. Venkat Mccord MD.             Condition on Discharge:  Stable    Vital Signs  Temp:  [97.9 °F (36.6 °C)-98.7 °F (37.1 °C)] 98.1 °F (36.7 °C)  Heart Rate:  [76-93] 76  Resp:  [18-20] 18  BP: (106-120)/(52-68) 106/52    Physical Exam:     General Appearance:    Alert, cooperative, in no acute distress   Head:    Normocephalic, without obvious abnormality, atraumatic   Eyes:            Lids and lashes normal, conjunctivae and  sclerae normal, no   icterus, no pallor, corneas clear, PERRLA   Ears:    Ears appear intact with no abnormalities noted   Throat:   No oral lesions, no thrush, oral mucosa moist   Neck:   No adenopathy, supple, trachea midline, no thyromegaly, no     carotid bruit, no JVD   Back:     No kyphosis present, no scoliosis present, no skin lesions,       erythema or scars, no tenderness to percussion or                   palpation,   range of motion normal   Lungs:     Clear to auscultation,respirations regular, even and                   unlabored    Heart:    Regular rhythm and normal rate, normal S1 and S2, no            murmur, no gallop, no rub, no click   Breast Exam:    Deferred   Abdomen:     Normal bowel sounds, no masses, no organomegaly, soft        non-tender, non-distended, no guarding, no rebound                 tenderness   Genitalia:    Deferred   Extremities:   Moves all extremities well, no edema, no cyanosis, no              redness   Pulses:   Pulses palpable and equal bilaterally   Skin:   No bleeding, bruising or rash   Lymph nodes:   No palpable adenopathy   Neurologic:   Cranial nerves 2 - 12 grossly intact, sensation intact, DTR        present and equal bilaterally       Discharge Disposition  Home or Self Care    Discharge Medications   [facility]   Home Medication Instructions YOVANI:[id number]    Printed on:12/11/17 0609   Medication Information                      ALPRAZolam (XANAX) 0.5 MG tablet  Take 0.5 mg by mouth 4 (Four) Times a Day.             amitriptyline (ELAVIL) 100 MG tablet  Take 1 tablet by mouth Every Night.             aspirin 81 MG EC tablet  Take 81 mg by mouth Daily.             bumetanide (BUMEX) 1 MG tablet  Take 1 mg by mouth Daily.             cefdinir (OMNICEF) 300 MG capsule  Take 1 capsule by mouth 2 (Two) Times a Day.             levothyroxine (SYNTHROID, LEVOTHROID) 125 MCG tablet  Take 125 mcg by mouth daily.             loratadine (CLARITIN) 10 MG  tablet  Take 10 mg by mouth Daily.             metoprolol tartrate (LOPRESSOR) 50 MG tablet  Take 50 mg by mouth Every Other Day. Prior to [facility], Patient was on: metoprolol 50mg qod for 4 doses             mirtazapine (REMERON) 7.5 MG tablet  Take 1 tablet by mouth Every Night.             ondansetron (ZOFRAN) 4 MG tablet  Take 4 mg by mouth 3 (Three) Times a Day.             oxyCODONE-acetaminophen (PERCOCET)  MG per tablet  Take 1 tablet by mouth 4 (Four) Times a Day As Needed for Moderate Pain .             pantoprazole (PROTONIX) 40 MG EC tablet  Take 40 mg by mouth Daily.             QUEtiapine (SEROquel) 300 MG tablet  Take 1 tablet by mouth Every Night.             raNITIdine (ZANTAC) 150 MG tablet  Take 150 mg by mouth Daily As Needed for Heartburn.             rOPINIRole (REQUIP) 1 MG tablet  Take 1 mg by mouth Every Night.             triamcinolone (KENALOG) 0.1 % cream  Apply 1 application topically As Needed for Irritation (under breasts).                 Discharge Diet:     Activity at Discharge:     Follow-up Appointments  Future Appointments  Date Time Provider Department Center   12/12/2017 10:45 AM Mikaela Key MD [facility] None         Test Results Pending at Discharge       Jeevan Tidwell MD  12/11/17  6:09 AM

## 2017-12-11 NOTE — PROGRESS NOTES
Continued Stay Note  JUAN Hurtado     Patient Name: Indigo Sorenson  MRN: [id number]  Today's Date: 12/11/2017    Admit Date: 12/4/2017          Discharge Plan       12/11/17 0951    Case Management/Social Work Plan    Plan Plans to return home living in [facility] with assistance of her two sons.    Patient/Family In Agreement With Plan yes    Additional Comments CM follow up today. Patient is alert and oriented. Pleasant. She has a discharge home order on chart today. States she has been ambulating ad marlena in hallways without oxygen. Currently no oxygen in use. She says her two sons live 7 minutes away and they check on her daily. She denies any home care or Home Health needs. One of her sons will provide discharge transportation. CM will follow and assist as needed.              Discharge Codes     None        Expected Discharge Date and Time     Expected Discharge Date Expected Discharge Time    Dec 11, 2017             Sharri Drew RN

## 2017-12-11 NOTE — PLAN OF CARE
Problem: Patient Care Overview (Adult)  Goal: Plan of Care Review  Outcome: Ongoing (interventions implemented as appropriate)    12/05/17 1115 12/09/17 0852 12/10/17 2023   Coping/Psychosocial Response Interventions   Plan Of Care Reviewed With --  --  patient   Patient Care Overview   Progress progress toward functional goals as expected --  --    Outcome Evaluation   Outcome Summary/Follow up Plan --  Patient may be discharged tomorrow.  --        Goal: Discharge Needs Assessment  Outcome: Ongoing (interventions implemented as appropriate)    12/05/17 1154   Discharge Needs Assessment   Concerns To Be Addressed no discharge needs identified  (States she has no needs. Lives in Assisted Living and her sons are very suportive.)   Readmission Within The Last 30 Days no previous admission in last 30 days   Equipment Needed After Discharge none  (Denies equipment needs.)   Discharge Disposition still a patient   Current Health   Outpatient/Agency/Support Group Needs (Denies outpatient needs.)   Living Environment   Transportation Available car  (Her 2 sons, Arnold and Hoang provide transpportation and will provide discharge transportation.)   Self-Care   Equipment Currently Used at Home none         Problem: Sepsis (Adult)  Goal: Signs and Symptoms of Listed Potential Problems Will be Absent or Manageable (Sepsis)  Outcome: Ongoing (interventions implemented as appropriate)    12/05/17 1115   Sepsis   Problems Assessed (Sepsis) all   Problems Present (Sepsis) none         12/05/17 1115   Sepsis   Problems Assessed (Sepsis) all   Problems Present (Sepsis) none         Problem: Fall Risk (Adult)  Goal: Identify Related Risk Factors and Signs and Symptoms  Outcome: Ongoing (interventions implemented as appropriate)    12/05/17 1115   Fall Risk   Fall Risk: Related Risk Factors age-related changes;gait/mobility problems   Fall Risk: Signs and Symptoms presence of risk factors       Goal: Absence of Falls  Outcome: Ongoing  (interventions implemented as appropriate)    12/10/17 2132   Fall Risk (Adult)   Absence of Falls making progress toward outcome         Problem: Infection, Risk/Actual (Adult)  Goal: Identify Related Risk Factors and Signs and Symptoms  Outcome: Ongoing (interventions implemented as appropriate)    12/05/17 1115 12/09/17 0852   Infection, Risk/Actual   Infection, Risk/Actual: Related Risk Factors --  prolonged hospitalization   Signs and Symptoms (Infection, Risk/Actual) weakness --        Goal: Infection Prevention/Resolution  Outcome: Ongoing (interventions implemented as appropriate)    12/10/17 2132   Infection, Risk/Actual (Adult)   Infection Prevention/Resolution making progress toward outcome

## 2017-12-11 NOTE — PROGRESS NOTES
Discharge Planning Assessment  JUAN Hurtado     Patient Name: Indigo Sorenson  MRN: [id number]  Today's Date: 12/11/2017    Admit Date: 12/4/2017    Discharge Plan       12/11/17 1047    Case Management/Social Work Plan    Patient/Family In Agreement With Plan yes    Final Note    Final Note Pt is being discharged home on this date. No new orders. Pt will be transported home via private auto. No other needs identified.      Flor Villanueva

## 2017-12-12 ENCOUNTER — OFFICE VISIT (OUTPATIENT)
Dept: PSYCHIATRY | Facility: CLINIC | Age: 72
End: 2017-12-12

## 2017-12-12 VITALS
DIASTOLIC BLOOD PRESSURE: 85 MMHG | WEIGHT: 190 LBS | BODY MASS INDEX: 32.44 KG/M2 | SYSTOLIC BLOOD PRESSURE: 126 MMHG | HEART RATE: 84 BPM | HEIGHT: 64 IN

## 2017-12-12 DIAGNOSIS — F41.1 GENERALIZED ANXIETY DISORDER: ICD-10-CM

## 2017-12-12 DIAGNOSIS — F33.3 MAJOR DEPRESSIVE DISORDER, RECURRENT, SEVERE WITH PSYCHOTIC FEATURES (HCC): Primary | ICD-10-CM

## 2017-12-12 PROCEDURE — 99213 OFFICE O/P EST LOW 20 MIN: CPT | Performed by: PSYCHIATRY & NEUROLOGY

## 2017-12-12 RX ORDER — QUETIAPINE FUMARATE 300 MG/1
300 TABLET, FILM COATED ORAL NIGHTLY
Qty: 30 TABLET | Refills: 2 | Status: SHIPPED | OUTPATIENT
Start: 2017-12-12 | End: 2018-03-08 | Stop reason: SDUPTHER

## 2017-12-12 RX ORDER — AMITRIPTYLINE HYDROCHLORIDE 100 MG/1
100 TABLET, FILM COATED ORAL NIGHTLY
Qty: 30 TABLET | Refills: 2 | Status: SHIPPED | OUTPATIENT
Start: 2017-12-12 | End: 2018-03-08 | Stop reason: SDUPTHER

## 2017-12-12 RX ORDER — MIRTAZAPINE 7.5 MG/1
7.5 TABLET, FILM COATED ORAL NIGHTLY
Qty: 30 TABLET | Refills: 2 | Status: SHIPPED | OUTPATIENT
Start: 2017-12-12 | End: 2018-03-08 | Stop reason: SDUPTHER

## 2017-12-12 NOTE — PROGRESS NOTES
"Subjective   Patient ID: Indigo Sorenson is a 72 y.o. female is here today for follow-up.    /85  Pulse 84  Ht 162 cm (63.78\")  Wt 86.2 kg (190 lb)  BMI 32.84 kg/m2    Procaine    History of Present Illness The patient states that she has been in the hospital for her heart and kidneys and also had an infection. She states that her mood has been anxious but her medications are helping.  The following portions of the patient's history were reviewed and updated as appropriate: allergies, current medications and problem list.    Review of Systems   Constitutional: Negative.    Eyes: Negative.    Respiratory: Negative.      PFSH:     Objective   Mental Status Exam  Appearance:  clean and casually dressed, appropriate  Attitude toward clinician:  cooperative and agreeable   Speech:    Rate:  regular rate and rhythm   Volume:  normal  Motor:  no abnormal movements present  Mood:  Anxious  Affect:  euthymic  Thought Processes:  linear, logical, and goal directed  Thought Content:  normal  Suicidal Thoughts:  absent  Homicidal Thoughts:  absent  Perceptual Disturbance: no perceptual disturbance  Attention and Concentration:  good  Insight and Judgement:  good  Memory:  memory appears to be intact    MEDICATION ISSUES:    Lab Review:   Admission on 12/04/2017, Discharged on 12/11/2017   No results displayed because visit has over 200 results.      Hospital Outpatient Visit on 11/29/2017   Component Date Value   • Nuclear Prior Study 11/29/2017 3    • Target HR (85%) 11/29/2017 126    • Max. Pred. HR (100%) 11/29/2017 148    • BH CV STRESS PROTOCOL 1 11/29/2017 Pharmacologic    • Stage 1 11/29/2017 1    • HR Stage 1 11/29/2017 101    • BP Stage 1 11/29/2017 120/46    • Duration Min Stage 1 11/29/2017 0    • Duration Sec Stage 1 11/29/2017 15    • Stress Dose Regadenoson * 11/29/2017 0.4    • Stress Comments Stage 1 11/29/2017 15 sec bolus injection    • Stage 2 11/29/2017 2    • HR Stage 2 11/29/2017 96    • BP Stage 2 " 11/29/2017 129/63    • Duration Min Stage 2 11/29/2017 4    • Duration Sec Stage 2 11/29/2017 0    • Stress Comments Stage 2 11/29/2017 recovery    • Baseline HR 11/29/2017 87    • Baseline BP 11/29/2017 124/69    • Peak HR 11/29/2017 101    • Percent Max Pred HR 11/29/2017 68.24    • Percent Target HR 11/29/2017 80    • Peak BP 11/29/2017 120/46    • Recovery HR 11/29/2017 96    • Recovery BP 11/29/2017 129/63    • Nuc Stress EF 11/29/2017 84    Admission on 11/24/2017, Discharged on 11/24/2017   Component Date Value   • Glucose 11/24/2017 100    • BUN 11/24/2017 18    • Creatinine 11/24/2017 0.89    • Sodium 11/24/2017 139    • Potassium 11/24/2017 3.6    • Chloride 11/24/2017 99    • CO2 11/24/2017 31.9    • Calcium 11/24/2017 9.1    • Total Protein 11/24/2017 7.1    • Albumin 11/24/2017 4.10    • ALT (SGPT) 11/24/2017 51*   • AST (SGOT) 11/24/2017 43*   • Alkaline Phosphatase 11/24/2017 95    • Total Bilirubin 11/24/2017 0.2    • eGFR Non  Amer 11/24/2017 62    • Globulin 11/24/2017 3.0    • A/G Ratio 11/24/2017 1.4*   • BUN/Creatinine Ratio 11/24/2017 20.2    • Anion Gap 11/24/2017 8.1    • Protime 11/24/2017 12.6    • INR 11/24/2017 0.93    • PTT 11/24/2017 22.6*   • BNP 11/24/2017 62.0    • Color, UA 11/24/2017 Yellow    • Appearance, UA 11/24/2017 Cloudy*   • pH, UA 11/24/2017 7.0    • Specific Gravity, UA 11/24/2017 1.013    • Glucose, UA 11/24/2017 Negative    • Ketones, UA 11/24/2017 Negative    • Bilirubin, UA 11/24/2017 Negative    • Blood, UA 11/24/2017 Negative    • Protein, UA 11/24/2017 Negative    • Leuk Esterase, UA 11/24/2017 Large (3+)*   • Nitrite, UA 11/24/2017 Negative    • Urobilinogen, UA 11/24/2017 0.2 E.U./dL    • Troponin I 11/24/2017 0.013    • CKMB 11/24/2017 4.81    • Creatine Kinase 11/24/2017 194*   • WBC 11/24/2017 9.29    • RBC 11/24/2017 4.33    • Hemoglobin 11/24/2017 12.1    • Hematocrit 11/24/2017 37.5    • MCV 11/24/2017 86.6    • MCH 11/24/2017 27.9    • MCHC  11/24/2017 32.3*   • RDW 11/24/2017 14.4    • RDW-SD 11/24/2017 44.6    • MPV 11/24/2017 12.3*   • Platelets 11/24/2017 155    • Neutrophil % 11/24/2017 47.3    • Lymphocyte % 11/24/2017 39.2    • Monocyte % 11/24/2017 7.5    • Eosinophil % 11/24/2017 4.7    • Basophil % 11/24/2017 0.5    • Immature Grans % 11/24/2017 0.8*   • Neutrophils, Absolute 11/24/2017 4.39    • Lymphocytes, Absolute 11/24/2017 3.64*   • Monocytes, Absolute 11/24/2017 0.70    • Eosinophils, Absolute 11/24/2017 0.44    • Basophils, Absolute 11/24/2017 0.05    • Immature Grans, Absolute 11/24/2017 0.07*   • Osmolality Calc 11/24/2017 279.5    • CK-MB Index 11/24/2017 2.5    • RBC, UA 11/24/2017 0-2    • WBC, UA 11/24/2017 21-30*   • Bacteria, UA 11/24/2017 None Seen    • Squamous Epithelial Cell* 11/24/2017 7-12*   • Transitional Epithelial * 11/24/2017 3-6*   • Hyaline Casts, UA 11/24/2017 None Seen    • Methodology 11/24/2017 Automated Microscopy    • Urine Culture 11/24/2017 >100,000 CFU/mL Normal Urogenital Jess    • Troponin I 11/24/2017 0.012    • Creatine Kinase 11/24/2017 177*   • CKMB 11/24/2017 4.66    • CK-MB Index 11/24/2017 2.6    Admission on 11/21/2017, Discharged on 11/21/2017   Component Date Value   • Glucose 11/21/2017 108    • BUN 11/21/2017 22*   • Creatinine 11/21/2017 0.83    • Sodium 11/21/2017 140    • Potassium 11/21/2017 3.5    • Chloride 11/21/2017 102    • CO2 11/21/2017 32.6*   • Calcium 11/21/2017 9.6    • Total Protein 11/21/2017 6.9    • Albumin 11/21/2017 4.10    • ALT (SGPT) 11/21/2017 36    • AST (SGOT) 11/21/2017 34*   • Alkaline Phosphatase 11/21/2017 99    • Total Bilirubin 11/21/2017 0.2    • eGFR Non  Amer 11/21/2017 68    • Globulin 11/21/2017 2.8    • A/G Ratio 11/21/2017 1.5    • BUN/Creatinine Ratio 11/21/2017 26.5*   • Anion Gap 11/21/2017 5.4    • WBC 11/21/2017 9.12    • RBC 11/21/2017 4.36    • Hemoglobin 11/21/2017 12.1    • Hematocrit 11/21/2017 37.2    • MCV 11/21/2017 85.3    • MCH  11/21/2017 27.8    • MCHC 11/21/2017 32.5*   • RDW 11/21/2017 14.1    • RDW-SD 11/21/2017 43.2    • MPV 11/21/2017 12.7*   • Platelets 11/21/2017 160    • Neutrophil % 11/21/2017 55.5    • Lymphocyte % 11/21/2017 34.1    • Monocyte % 11/21/2017 5.5    • Eosinophil % 11/21/2017 4.2    • Basophil % 11/21/2017 0.3    • Immature Grans % 11/21/2017 0.4    • Neutrophils, Absolute 11/21/2017 5.06    • Lymphocytes, Absolute 11/21/2017 3.11*   • Monocytes, Absolute 11/21/2017 0.50    • Eosinophils, Absolute 11/21/2017 0.38    • Basophils, Absolute 11/21/2017 0.03    • Immature Grans, Absolute 11/21/2017 0.04*   • Osmolality Calc 11/21/2017 283.3      Assessment/Plan   Diagnoses and all orders for this visit:    Major depressive disorder, recurrent, severe with psychotic features    Generalized anxiety disorder    Other orders  -     QUEtiapine (SEROquel) 300 MG tablet; Take 1 tablet by mouth Every Night.  -     amitriptyline (ELAVIL) 100 MG tablet; Take 1 tablet by mouth Every Night.  -     mirtazapine (REMERON) 7.5 MG tablet; Take 1 tablet by mouth Every Night.    Return in about 3 months (around 3/12/2018).

## 2018-01-31 ENCOUNTER — TRANSCRIBE ORDERS (OUTPATIENT)
Dept: ADMINISTRATIVE | Facility: HOSPITAL | Age: 73
End: 2018-01-31

## 2018-01-31 DIAGNOSIS — Z12.31 VISIT FOR SCREENING MAMMOGRAM: Primary | ICD-10-CM

## 2018-03-09 RX ORDER — AMITRIPTYLINE HYDROCHLORIDE 100 MG/1
100 TABLET, FILM COATED ORAL NIGHTLY
Qty: 30 TABLET | Refills: 2 | Status: SHIPPED | OUTPATIENT
Start: 2018-03-09 | End: 2018-06-06 | Stop reason: SDUPTHER

## 2018-03-09 RX ORDER — MIRTAZAPINE 7.5 MG/1
7.5 TABLET, FILM COATED ORAL NIGHTLY
Qty: 30 TABLET | Refills: 2 | Status: SHIPPED | OUTPATIENT
Start: 2018-03-09

## 2018-03-09 RX ORDER — QUETIAPINE FUMARATE 300 MG/1
300 TABLET, FILM COATED ORAL NIGHTLY
Qty: 30 TABLET | Refills: 2 | Status: SHIPPED | OUTPATIENT
Start: 2018-03-09 | End: 2018-06-06 | Stop reason: SDUPTHER

## 2018-04-17 ENCOUNTER — OFFICE VISIT (OUTPATIENT)
Dept: PSYCHIATRY | Facility: CLINIC | Age: 73
End: 2018-04-17

## 2018-04-17 VITALS
SYSTOLIC BLOOD PRESSURE: 132 MMHG | DIASTOLIC BLOOD PRESSURE: 79 MMHG | BODY MASS INDEX: 32.61 KG/M2 | WEIGHT: 191 LBS | HEART RATE: 72 BPM | HEIGHT: 64 IN

## 2018-04-17 DIAGNOSIS — Z79.899 DRUG THERAPY: ICD-10-CM

## 2018-04-17 DIAGNOSIS — F33.3 MAJOR DEPRESSIVE DISORDER, RECURRENT, SEVERE WITH PSYCHOTIC FEATURES (HCC): Primary | ICD-10-CM

## 2018-04-17 DIAGNOSIS — F41.1 GENERALIZED ANXIETY DISORDER: ICD-10-CM

## 2018-04-17 PROCEDURE — 99213 OFFICE O/P EST LOW 20 MIN: CPT | Performed by: PSYCHIATRY & NEUROLOGY

## 2018-04-17 RX ORDER — PROPRANOLOL HYDROCHLORIDE 40 MG/1
40 TABLET ORAL 3 TIMES DAILY
Refills: 5 | COMMUNITY
Start: 2018-04-10

## 2018-04-17 RX ORDER — POTASSIUM CHLORIDE 750 MG/1
TABLET, FILM COATED, EXTENDED RELEASE ORAL
Refills: 1 | Status: ON HOLD | COMMUNITY
Start: 2018-02-16 | End: 2018-05-16

## 2018-04-17 RX ORDER — FUROSEMIDE 40 MG/1
40 TABLET ORAL DAILY
Refills: 2 | Status: ON HOLD | COMMUNITY
Start: 2018-03-30 | End: 2018-05-16

## 2018-04-17 RX ORDER — ROPINIROLE 2 MG/1
2 TABLET, FILM COATED ORAL NIGHTLY
Refills: 5 | COMMUNITY
Start: 2018-03-30

## 2018-04-17 RX ORDER — NITROGLYCERIN 0.4 MG/1
0.4 TABLET SUBLINGUAL
Refills: 0 | COMMUNITY
Start: 2018-03-26

## 2018-04-17 NOTE — PROGRESS NOTES
"Subjective   Patient ID: Indigo Sorenson is a 72 y.o. female is here today for follow-up.    /79   Pulse 72   Ht 162 cm (63.78\")   Wt 86.6 kg (191 lb)   BMI 33.01 kg/m²     Procaine    History of Present Illness The patient states that she is feeling anxious and worried about her sons. They both have some health issues and one son was in the hospital recently for some GI problems, but he is doing better and brought the patient to the clinic today. She is encouraged to not worry about things that may or may not happen and make the most of good things in her life. She reported that her son's spoil her and bring her evening meals and she has been gaining weight. She is encouraged to eat healthy and do regular exercise. She agreed to check her lipid profile and HbA1c.  The following portions of the patient's history were reviewed and updated as appropriate: allergies, current medications and problem list.    Review of Systems   Constitutional: Negative.    Eyes: Negative.    Respiratory: Negative.      PFSH:     Objective   Mental Status Exam  Appearance:  clean and casually dressed, appropriate  Attitude toward clinician:  cooperative and agreeable   Speech:    Rate:  regular rate and rhythm   Volume:  normal  Motor:  no abnormal movements present  Mood:  Anxious  Affect:  euthymic  Thought Processes:  linear, logical, and goal directed  Thought Content:  normal  Suicidal Thoughts:  absent  Homicidal Thoughts:  absent  Perceptual Disturbance: no perceptual disturbance  Attention and Concentration:  good  Insight and Judgement:  good  Memory:  memory appears to be intact    MEDICATION ISSUES:    Lab Review:   No visits with results within 2 Month(s) from this visit.   Latest known visit with results is:   Admission on 12/04/2017, Discharged on 12/11/2017   No results displayed because visit has over 200 results.        Assessment/Plan   Diagnoses and all orders for this visit:    Major depressive disorder, " recurrent, severe with psychotic features    Drug therapy  -     Lipid Panel; Future  -     Hemoglobin A1c; Future    Generalized anxiety disorder      Return in about 2 months (around 6/17/2018).

## 2018-04-24 ENCOUNTER — TRANSCRIBE ORDERS (OUTPATIENT)
Dept: ADMINISTRATIVE | Facility: HOSPITAL | Age: 73
End: 2018-04-24

## 2018-04-24 DIAGNOSIS — R10.813 RIGHT LOWER QUADRANT ABDOMINAL TENDERNESS WITHOUT REBOUND TENDERNESS: Primary | ICD-10-CM

## 2018-05-04 ENCOUNTER — HOSPITAL ENCOUNTER (OUTPATIENT)
Dept: ULTRASOUND IMAGING | Facility: HOSPITAL | Age: 73
Discharge: HOME | End: 2018-05-04
Admitting: NURSE PRACTITIONER

## 2018-05-04 DIAGNOSIS — R10.813 RIGHT LOWER QUADRANT ABDOMINAL TENDERNESS WITHOUT REBOUND TENDERNESS: ICD-10-CM

## 2018-05-04 PROCEDURE — 76700 US EXAM ABDOM COMPLETE: CPT | Performed by: RADIOLOGY

## 2018-05-04 PROCEDURE — 76700 US EXAM ABDOM COMPLETE: CPT

## 2018-05-07 ENCOUNTER — EPISODE CHANGES (OUTPATIENT)
Dept: CASE MANAGEMENT | Facility: OTHER | Age: 73
End: 2018-05-07

## 2018-05-16 ENCOUNTER — HOSPITAL ENCOUNTER (OUTPATIENT)
Facility: HOSPITAL | Age: 73
Discharge: HOME | End: 2018-05-17
Attending: EMERGENCY MEDICINE | Admitting: INTERNAL MEDICINE

## 2018-05-16 ENCOUNTER — APPOINTMENT (OUTPATIENT)
Dept: GENERAL RADIOLOGY | Facility: HOSPITAL | Age: 73
End: 2018-05-16

## 2018-05-16 DIAGNOSIS — I21.11 ST ELEVATION MYOCARDIAL INFARCTION INVOLVING RIGHT CORONARY ARTERY (HCC): Primary | ICD-10-CM

## 2018-05-16 LAB
ALBUMIN SERPL-MCNC: 4.7 G/DL (ref 3.4–4.8)
ALBUMIN/GLOB SERPL: 1.5 G/DL (ref 1.5–2.5)
ALP SERPL-CCNC: 88 U/L (ref 35–104)
ALT SERPL W P-5'-P-CCNC: 40 U/L (ref 10–36)
ANION GAP SERPL CALCULATED.3IONS-SCNC: 9.8 MMOL/L (ref 3.6–11.2)
APTT PPP: 23.4 SECONDS (ref 23.8–36.1)
AST SERPL-CCNC: 36 U/L (ref 10–30)
BACTERIA UR QL AUTO: NORMAL /HPF
BASOPHILS # BLD AUTO: 0.05 10*3/MM3 (ref 0–0.3)
BASOPHILS NFR BLD AUTO: 0.5 % (ref 0–2)
BILIRUB SERPL-MCNC: 0.3 MG/DL (ref 0.2–1.8)
BILIRUB UR QL STRIP: NEGATIVE
BNP SERPL-MCNC: 101 PG/ML (ref 0–100)
BUN BLD-MCNC: 20 MG/DL (ref 7–21)
BUN/CREAT SERPL: 20 (ref 7–25)
CALCIUM SPEC-SCNC: 9.9 MG/DL (ref 7.7–10)
CHLORIDE SERPL-SCNC: 101 MMOL/L (ref 99–112)
CLARITY UR: CLEAR
CO2 SERPL-SCNC: 28.2 MMOL/L (ref 24.3–31.9)
COLOR UR: YELLOW
CREAT BLD-MCNC: 1 MG/DL (ref 0.43–1.29)
DEPRECATED RDW RBC AUTO: 46.7 FL (ref 37–54)
EOSINOPHIL # BLD AUTO: 0.24 10*3/MM3 (ref 0–0.7)
EOSINOPHIL NFR BLD AUTO: 2.5 % (ref 0–7)
ERYTHROCYTE [DISTWIDTH] IN BLOOD BY AUTOMATED COUNT: 15.2 % (ref 11.5–14.5)
GFR SERPL CREATININE-BSD FRML MDRD: 55 ML/MIN/1.73
GLOBULIN UR ELPH-MCNC: 3.2 GM/DL
GLUCOSE BLD-MCNC: 102 MG/DL (ref 70–110)
GLUCOSE UR STRIP-MCNC: NEGATIVE MG/DL
HCT VFR BLD AUTO: 39.2 % (ref 37–47)
HGB BLD-MCNC: 12.9 G/DL (ref 12–16)
HGB UR QL STRIP.AUTO: ABNORMAL
HYALINE CASTS UR QL AUTO: NORMAL /LPF
IMM GRANULOCYTES # BLD: 0.05 10*3/MM3 (ref 0–0.03)
IMM GRANULOCYTES NFR BLD: 0.5 % (ref 0–0.5)
INR PPP: 0.97 (ref 0.9–1.1)
KETONES UR QL STRIP: NEGATIVE
LEUKOCYTE ESTERASE UR QL STRIP.AUTO: NEGATIVE
LYMPHOCYTES # BLD AUTO: 3.35 10*3/MM3 (ref 1–3)
LYMPHOCYTES NFR BLD AUTO: 34.9 % (ref 16–46)
MCH RBC QN AUTO: 28 PG (ref 27–33)
MCHC RBC AUTO-ENTMCNC: 32.9 G/DL (ref 33–37)
MCV RBC AUTO: 85 FL (ref 80–94)
MONOCYTES # BLD AUTO: 0.82 10*3/MM3 (ref 0.1–0.9)
MONOCYTES NFR BLD AUTO: 8.5 % (ref 0–12)
NEUTROPHILS # BLD AUTO: 5.09 10*3/MM3 (ref 1.4–6.5)
NEUTROPHILS NFR BLD AUTO: 53.1 % (ref 40–75)
NITRITE UR QL STRIP: NEGATIVE
OSMOLALITY SERPL CALC.SUM OF ELEC: 280.3 MOSM/KG (ref 273–305)
PH UR STRIP.AUTO: 6.5 [PH] (ref 5–8)
PLATELET # BLD AUTO: 196 10*3/MM3 (ref 130–400)
PMV BLD AUTO: 11.8 FL (ref 6–10)
POTASSIUM BLD-SCNC: 3.4 MMOL/L (ref 3.5–5.3)
PROT SERPL-MCNC: 7.9 G/DL (ref 6–8)
PROT UR QL STRIP: NEGATIVE
PROTHROMBIN TIME: 13.1 SECONDS (ref 11–15.4)
RBC # BLD AUTO: 4.61 10*6/MM3 (ref 4.2–5.4)
RBC # UR: NORMAL /HPF
REF LAB TEST METHOD: NORMAL
SODIUM BLD-SCNC: 139 MMOL/L (ref 135–153)
SP GR UR STRIP: >1.03 (ref 1–1.03)
SQUAMOUS #/AREA URNS HPF: NORMAL /HPF
TROPONIN I SERPL-MCNC: 0.01 NG/ML
UROBILINOGEN UR QL STRIP: ABNORMAL
WBC NRBC COR # BLD: 9.6 10*3/MM3 (ref 4.5–12.5)
WBC UR QL AUTO: NORMAL /HPF

## 2018-05-16 PROCEDURE — 81001 URINALYSIS AUTO W/SCOPE: CPT | Performed by: EMERGENCY MEDICINE

## 2018-05-16 PROCEDURE — A9270 NON-COVERED ITEM OR SERVICE: HCPCS | Performed by: INTERNAL MEDICINE

## 2018-05-16 PROCEDURE — 63710000001 OXYCODONE-ACETAMINOPHEN 10-325 MG TABLET

## 2018-05-16 PROCEDURE — 93454 CORONARY ARTERY ANGIO S&I: CPT | Performed by: INTERNAL MEDICINE

## 2018-05-16 PROCEDURE — A9270 NON-COVERED ITEM OR SERVICE: HCPCS

## 2018-05-16 PROCEDURE — 93005 ELECTROCARDIOGRAM TRACING: CPT | Performed by: EMERGENCY MEDICINE

## 2018-05-16 PROCEDURE — 63710000001 QUETIAPINE 300 MG TABLET: Performed by: INTERNAL MEDICINE

## 2018-05-16 PROCEDURE — 63710000001 ROSUVASTATIN 10 MG TABLET: Performed by: INTERNAL MEDICINE

## 2018-05-16 PROCEDURE — C1760 CLOSURE DEV, VASC: HCPCS | Performed by: INTERNAL MEDICINE

## 2018-05-16 PROCEDURE — 83880 ASSAY OF NATRIURETIC PEPTIDE: CPT | Performed by: EMERGENCY MEDICINE

## 2018-05-16 PROCEDURE — 63710000001 ALPRAZOLAM 0.5 MG TABLET: Performed by: INTERNAL MEDICINE

## 2018-05-16 PROCEDURE — 94799 UNLISTED PULMONARY SVC/PX: CPT

## 2018-05-16 PROCEDURE — 84484 ASSAY OF TROPONIN QUANT: CPT | Performed by: EMERGENCY MEDICINE

## 2018-05-16 PROCEDURE — 80053 COMPREHEN METABOLIC PANEL: CPT | Performed by: EMERGENCY MEDICINE

## 2018-05-16 PROCEDURE — 71045 X-RAY EXAM CHEST 1 VIEW: CPT

## 2018-05-16 PROCEDURE — 25010000002 FENTANYL CITRATE (PF) 100 MCG/2ML SOLUTION: Performed by: INTERNAL MEDICINE

## 2018-05-16 PROCEDURE — 85610 PROTHROMBIN TIME: CPT | Performed by: EMERGENCY MEDICINE

## 2018-05-16 PROCEDURE — 0 IOPAMIDOL PER 1 ML: Performed by: INTERNAL MEDICINE

## 2018-05-16 PROCEDURE — 71045 X-RAY EXAM CHEST 1 VIEW: CPT | Performed by: RADIOLOGY

## 2018-05-16 PROCEDURE — 85730 THROMBOPLASTIN TIME PARTIAL: CPT | Performed by: EMERGENCY MEDICINE

## 2018-05-16 PROCEDURE — 63710000001 AMITRIPTYLINE 50 MG TABLET: Performed by: INTERNAL MEDICINE

## 2018-05-16 PROCEDURE — C1894 INTRO/SHEATH, NON-LASER: HCPCS | Performed by: INTERNAL MEDICINE

## 2018-05-16 PROCEDURE — C1887 CATHETER, GUIDING: HCPCS | Performed by: INTERNAL MEDICINE

## 2018-05-16 PROCEDURE — 63710000001 ROPINIROLE 1 MG TABLET: Performed by: INTERNAL MEDICINE

## 2018-05-16 PROCEDURE — 85025 COMPLETE CBC W/AUTO DIFF WBC: CPT | Performed by: EMERGENCY MEDICINE

## 2018-05-16 PROCEDURE — 99284 EMERGENCY DEPT VISIT MOD MDM: CPT

## 2018-05-16 PROCEDURE — 96374 THER/PROPH/DIAG INJ IV PUSH: CPT

## 2018-05-16 PROCEDURE — C1769 GUIDE WIRE: HCPCS | Performed by: INTERNAL MEDICINE

## 2018-05-16 PROCEDURE — 25010000002 HEPARIN (PORCINE) PER 1000 UNITS

## 2018-05-16 PROCEDURE — 63710000001 MIRTAZAPINE 15 MG TABLET: Performed by: INTERNAL MEDICINE

## 2018-05-16 PROCEDURE — 25010000002 MIDAZOLAM PER 1 MG: Performed by: INTERNAL MEDICINE

## 2018-05-16 RX ORDER — AMITRIPTYLINE HYDROCHLORIDE 50 MG/1
100 TABLET, FILM COATED ORAL NIGHTLY
Status: DISCONTINUED | OUTPATIENT
Start: 2018-05-16 | End: 2018-05-17 | Stop reason: HOSPADM

## 2018-05-16 RX ORDER — SODIUM CHLORIDE 9 MG/ML
INJECTION, SOLUTION INTRAVENOUS CONTINUOUS PRN
Status: COMPLETED | OUTPATIENT
Start: 2018-05-16 | End: 2018-05-16

## 2018-05-16 RX ORDER — ZOLPIDEM TARTRATE 5 MG/1
5 TABLET ORAL NIGHTLY PRN
Status: DISCONTINUED | OUTPATIENT
Start: 2018-05-16 | End: 2018-05-17 | Stop reason: HOSPADM

## 2018-05-16 RX ORDER — SODIUM CHLORIDE 9 MG/ML
100 INJECTION, SOLUTION INTRAVENOUS ONCE
Status: DISCONTINUED | OUTPATIENT
Start: 2018-05-16 | End: 2018-05-17 | Stop reason: HOSPADM

## 2018-05-16 RX ORDER — ACETAMINOPHEN 325 MG/1
650 TABLET ORAL EVERY 4 HOURS PRN
Status: DISCONTINUED | OUTPATIENT
Start: 2018-05-16 | End: 2018-05-17 | Stop reason: HOSPADM

## 2018-05-16 RX ORDER — HEPARIN SODIUM 5000 [USP'U]/ML
INJECTION, SOLUTION INTRAVENOUS; SUBCUTANEOUS
Status: COMPLETED
Start: 2018-05-16 | End: 2018-05-16

## 2018-05-16 RX ORDER — MIDAZOLAM HYDROCHLORIDE 1 MG/ML
INJECTION INTRAMUSCULAR; INTRAVENOUS AS NEEDED
Status: DISCONTINUED | OUTPATIENT
Start: 2018-05-16 | End: 2018-05-16 | Stop reason: HOSPADM

## 2018-05-16 RX ORDER — PROPRANOLOL HYDROCHLORIDE 40 MG/1
40 TABLET ORAL 3 TIMES DAILY
Status: CANCELLED | OUTPATIENT
Start: 2018-05-16

## 2018-05-16 RX ORDER — NITROGLYCERIN 0.4 MG/1
0.4 TABLET SUBLINGUAL
Status: DISCONTINUED | OUTPATIENT
Start: 2018-05-16 | End: 2018-05-17 | Stop reason: HOSPADM

## 2018-05-16 RX ORDER — QUETIAPINE FUMARATE 300 MG/1
300 TABLET, FILM COATED ORAL NIGHTLY
Status: DISCONTINUED | OUTPATIENT
Start: 2018-05-16 | End: 2018-05-17 | Stop reason: HOSPADM

## 2018-05-16 RX ORDER — ASPIRIN 81 MG/1
TABLET, CHEWABLE ORAL
Status: COMPLETED
Start: 2018-05-16 | End: 2018-05-16

## 2018-05-16 RX ORDER — PANTOPRAZOLE SODIUM 40 MG/1
40 TABLET, DELAYED RELEASE ORAL DAILY
Status: DISCONTINUED | OUTPATIENT
Start: 2018-05-16 | End: 2018-05-17 | Stop reason: HOSPADM

## 2018-05-16 RX ORDER — ONDANSETRON 4 MG/1
4 TABLET, ORALLY DISINTEGRATING ORAL EVERY 6 HOURS PRN
Status: DISCONTINUED | OUTPATIENT
Start: 2018-05-16 | End: 2018-05-17 | Stop reason: HOSPADM

## 2018-05-16 RX ORDER — ASPIRIN 81 MG/1
81 TABLET ORAL DAILY
Status: CANCELLED | OUTPATIENT
Start: 2018-05-16

## 2018-05-16 RX ORDER — LISINOPRIL 2.5 MG/1
5 TABLET ORAL DAILY
Status: DISCONTINUED | OUTPATIENT
Start: 2018-05-16 | End: 2018-05-17 | Stop reason: HOSPADM

## 2018-05-16 RX ORDER — OXYCODONE AND ACETAMINOPHEN 10; 325 MG/1; MG/1
1 TABLET ORAL 4 TIMES DAILY PRN
Status: DISCONTINUED | OUTPATIENT
Start: 2018-05-16 | End: 2018-05-17 | Stop reason: HOSPADM

## 2018-05-16 RX ORDER — HEPARIN SODIUM 5000 [USP'U]/ML
4000 INJECTION, SOLUTION INTRAVENOUS; SUBCUTANEOUS ONCE
Status: COMPLETED | OUTPATIENT
Start: 2018-05-16 | End: 2018-05-16

## 2018-05-16 RX ORDER — ALPRAZOLAM 0.5 MG/1
0.5 TABLET ORAL 4 TIMES DAILY PRN
Status: DISCONTINUED | OUTPATIENT
Start: 2018-05-16 | End: 2018-05-17 | Stop reason: HOSPADM

## 2018-05-16 RX ORDER — LIDOCAINE HYDROCHLORIDE 20 MG/ML
INJECTION, SOLUTION INFILTRATION; PERINEURAL AS NEEDED
Status: DISCONTINUED | OUTPATIENT
Start: 2018-05-16 | End: 2018-05-16 | Stop reason: HOSPADM

## 2018-05-16 RX ORDER — ASPIRIN 81 MG/1
81 TABLET ORAL DAILY
Status: DISCONTINUED | OUTPATIENT
Start: 2018-05-17 | End: 2018-05-17 | Stop reason: HOSPADM

## 2018-05-16 RX ORDER — ONDANSETRON 4 MG/1
4 TABLET, FILM COATED ORAL EVERY 6 HOURS PRN
Status: DISCONTINUED | OUTPATIENT
Start: 2018-05-16 | End: 2018-05-17 | Stop reason: HOSPADM

## 2018-05-16 RX ORDER — FUROSEMIDE 40 MG/1
40 TABLET ORAL
Status: CANCELLED | OUTPATIENT
Start: 2018-05-16

## 2018-05-16 RX ORDER — LEVOTHYROXINE SODIUM 0.12 MG/1
125 TABLET ORAL DAILY
Status: DISCONTINUED | OUTPATIENT
Start: 2018-05-16 | End: 2018-05-17 | Stop reason: HOSPADM

## 2018-05-16 RX ORDER — FUROSEMIDE 40 MG/1
40 TABLET ORAL 2 TIMES DAILY
COMMUNITY
End: 2018-05-17 | Stop reason: HOSPADM

## 2018-05-16 RX ORDER — FENTANYL CITRATE 50 UG/ML
INJECTION, SOLUTION INTRAMUSCULAR; INTRAVENOUS AS NEEDED
Status: DISCONTINUED | OUTPATIENT
Start: 2018-05-16 | End: 2018-05-16 | Stop reason: HOSPADM

## 2018-05-16 RX ORDER — ROSUVASTATIN CALCIUM 10 MG/1
5 TABLET, COATED ORAL NIGHTLY
Status: DISCONTINUED | OUTPATIENT
Start: 2018-05-16 | End: 2018-05-17 | Stop reason: HOSPADM

## 2018-05-16 RX ORDER — ASPIRIN 81 MG/1
324 TABLET, CHEWABLE ORAL ONCE
Status: COMPLETED | OUTPATIENT
Start: 2018-05-16 | End: 2018-05-16

## 2018-05-16 RX ORDER — OXYCODONE AND ACETAMINOPHEN 10; 325 MG/1; MG/1
TABLET ORAL
Status: COMPLETED
Start: 2018-05-16 | End: 2018-05-16

## 2018-05-16 RX ORDER — NITROGLYCERIN 0.4 MG/1
0.4 TABLET SUBLINGUAL
Status: CANCELLED | OUTPATIENT
Start: 2018-05-16

## 2018-05-16 RX ORDER — MIRTAZAPINE 15 MG/1
7.5 TABLET, FILM COATED ORAL NIGHTLY
Status: DISCONTINUED | OUTPATIENT
Start: 2018-05-16 | End: 2018-05-17 | Stop reason: HOSPADM

## 2018-05-16 RX ORDER — ALPRAZOLAM 0.5 MG/1
0.5 TABLET ORAL 4 TIMES DAILY PRN
COMMUNITY

## 2018-05-16 RX ORDER — ROPINIROLE 1 MG/1
2 TABLET, FILM COATED ORAL NIGHTLY
Status: DISCONTINUED | OUTPATIENT
Start: 2018-05-16 | End: 2018-05-17 | Stop reason: HOSPADM

## 2018-05-16 RX ORDER — ONDANSETRON 2 MG/ML
4 INJECTION INTRAMUSCULAR; INTRAVENOUS EVERY 6 HOURS PRN
Status: DISCONTINUED | OUTPATIENT
Start: 2018-05-16 | End: 2018-05-17 | Stop reason: HOSPADM

## 2018-05-16 RX ADMIN — ROSUVASTATIN CALCIUM 5 MG: 10 TABLET, FILM COATED ORAL at 20:14

## 2018-05-16 RX ADMIN — ALPRAZOLAM 0.5 MG: 0.5 TABLET ORAL at 22:27

## 2018-05-16 RX ADMIN — AMITRIPTYLINE HYDROCHLORIDE 100 MG: 50 TABLET, FILM COATED ORAL at 20:13

## 2018-05-16 RX ADMIN — MIRTAZAPINE 7.5 MG: 15 TABLET, FILM COATED ORAL at 20:13

## 2018-05-16 RX ADMIN — ASPIRIN 324 MG: 81 TABLET, CHEWABLE ORAL at 14:13

## 2018-05-16 RX ADMIN — TICAGRELOR 180 MG: 90 TABLET ORAL at 14:15

## 2018-05-16 RX ADMIN — ROPINIROLE HYDROCHLORIDE 2 MG: 1 TABLET, FILM COATED ORAL at 20:14

## 2018-05-16 RX ADMIN — OXYCODONE HYDROCHLORIDE AND ACETAMINOPHEN 1 TABLET: 10; 325 TABLET ORAL at 17:00

## 2018-05-16 RX ADMIN — HEPARIN SODIUM 4000 UNITS: 5000 INJECTION, SOLUTION INTRAVENOUS; SUBCUTANEOUS at 14:18

## 2018-05-16 RX ADMIN — QUETIAPINE FUMARATE 300 MG: 300 TABLET, FILM COATED ORAL at 20:13

## 2018-05-16 RX ADMIN — NITROGLYCERIN 0.4 MG: 0.4 TABLET SUBLINGUAL at 14:19

## 2018-05-16 NOTE — ED PROVIDER NOTES
Subjective     History provided by:  Patient  Chest Pain   Pain location:  Substernal area  Pain quality: aching    Pain radiates to:  L shoulder  Pain severity:  Severe  Onset quality:  Gradual  Duration:  1 day  Timing:  Constant  Progression:  Worsening (worsening past hour)  Chronicity:  New  Relieved by:  Nothing  Worsened by:  Nothing      Review of Systems   Constitutional: Positive for activity change and appetite change.   HENT: Negative.    Eyes: Negative.    Respiratory: Negative.    Cardiovascular: Positive for chest pain.   Gastrointestinal: Negative.    Endocrine: Negative.    Genitourinary: Negative.    Musculoskeletal: Negative.    Skin: Negative.    Allergic/Immunologic: Negative.    Neurological: Negative.    Hematological: Negative.    Psychiatric/Behavioral: Negative.        Past Medical History:   Diagnosis Date   • Anxiety    • Coronary artery disease    • Degenerative disorder of bone    • Depression    • Disease of thyroid gland    • Fall 09/2017   • Fibromyalgia    • GERD (gastroesophageal reflux disease)    • Hyperlipidemia    • Hypertension    • Injury of back    • Migraine        Allergies   Allergen Reactions   • Procaine Shortness Of Breath       Past Surgical History:   Procedure Laterality Date   • APPENDECTOMY     • CARDIAC CATHETERIZATION     • CARDIAC CATHETERIZATION     • CARDIAC CATHETERIZATION N/A 5/16/2018    Procedure: Left Heart Cath;  Surgeon: Von Lamb MD;  Location: [facility] INVASIVE LOCATION;  Service: Cardiology   • CHOLECYSTECTOMY     • ENDOSCOPY N/A 7/5/2016    Procedure: ESOPHAGOGASTRODUODENOSCOPY;  Surgeon: Ziggy Sprague III, MD;  Location: [facility];  Service:    • ENDOSCOPY N/A 7/5/2016    Procedure: ESOPHAGOGASTRODUODENOSCOPY WITH DILATATION;  Surgeon: Ziggy Sprague III, MD;  Location: [facility];  Service:    • HYSTERECTOMY         Family History   Problem Relation Age of Onset   • Cancer Mother         Pancreatic cancer   • Cancer Father    •  Heart disease Brother        Social History     Social History   • Marital status: Single     Social History Main Topics   • Smoking status: Never Smoker   • Smokeless tobacco: Never Used   • Alcohol use No   • Drug use: No   • Sexual activity: Defer     Other Topics Concern   • Not on file           Objective   Physical Exam   Constitutional: She appears well-developed and well-nourished.   HENT:   Head: Normocephalic.   Eyes: Pupils are equal, round, and reactive to light.   Neck: Normal range of motion.   Cardiovascular: Normal rate.    Pulmonary/Chest: Effort normal.   Abdominal: Soft.   Musculoskeletal: Normal range of motion.   Neurological: She is alert.   Skin: Skin is warm.   Nursing note and vitals reviewed.      Procedures           ED Course                  MDM      Final diagnoses:   ST elevation myocardial infarction involving right coronary artery            Gabe Delaney MD  05/16/18 1427       Gabe Delaney MD  05/18/18 0004       Gabe Delaney MD  05/18/18 0005

## 2018-05-16 NOTE — PLAN OF CARE
Problem: Patient Care Overview  Goal: Plan of Care Review  Outcome: Ongoing (interventions implemented as appropriate)    Goal: Individualization and Mutuality  Outcome: Ongoing (interventions implemented as appropriate)    Goal: Discharge Needs Assessment  Outcome: Ongoing (interventions implemented as appropriate)    Goal: Interprofessional Rounds/Family Conf  Outcome: Ongoing (interventions implemented as appropriate)      Problem: Cardiac Catheterization (Diagnostic/Interventional) (Adult)  Goal: Signs and Symptoms of Listed Potential Problems Will be Absent, Minimized or Managed (Cardiac Catheterization)  Outcome: Ongoing (interventions implemented as appropriate)    Goal: Anesthesia/Sedation Recovery  Outcome: Ongoing (interventions implemented as appropriate)      Problem: Pain, Acute (Adult)  Goal: Identify Related Risk Factors and Signs and Symptoms  Outcome: Ongoing (interventions implemented as appropriate)    Goal: Acceptable Pain Control/Comfort Level  Outcome: Ongoing (interventions implemented as appropriate)

## 2018-05-16 NOTE — ED NOTES
EKG done at 14:01 and shown to Dr. Delaney   One Push activated at 14:05  Dr. Lamb called at 14:06 per Dr. Delaney request   Cath Lab called and made aware at 14:07  House Supervisor made aware at 14:08  Cath Lab called and said they are ready for patient to be transferred down at 14:19       Deepti Doll  05/16/18 4258

## 2018-05-17 ENCOUNTER — APPOINTMENT (OUTPATIENT)
Dept: CARDIOLOGY | Facility: HOSPITAL | Age: 73
End: 2018-05-17
Attending: INTERNAL MEDICINE

## 2018-05-17 VITALS
TEMPERATURE: 97.8 F | HEART RATE: 77 BPM | RESPIRATION RATE: 18 BRPM | HEIGHT: 65 IN | OXYGEN SATURATION: 96 % | DIASTOLIC BLOOD PRESSURE: 64 MMHG | WEIGHT: 190.8 LBS | SYSTOLIC BLOOD PRESSURE: 110 MMHG | BODY MASS INDEX: 31.79 KG/M2

## 2018-05-17 LAB
ANION GAP SERPL CALCULATED.3IONS-SCNC: 7.2 MMOL/L (ref 3.6–11.2)
BH CV ECHO MEAS - % IVS THICK: 14.5 %
BH CV ECHO MEAS - % LVPW THICK: 70.1 %
BH CV ECHO MEAS - ACS: 2.1 CM
BH CV ECHO MEAS - AO MAX PG: 8 MMHG
BH CV ECHO MEAS - AO MEAN PG: 4.2 MMHG
BH CV ECHO MEAS - AO ROOT AREA (BSA CORRECTED): 1.7
BH CV ECHO MEAS - AO ROOT AREA: 8.4 CM^2
BH CV ECHO MEAS - AO ROOT DIAM: 3.3 CM
BH CV ECHO MEAS - AO V2 MAX: 141.2 CM/SEC
BH CV ECHO MEAS - AO V2 MEAN: 94.5 CM/SEC
BH CV ECHO MEAS - AO V2 VTI: 30.9 CM
BH CV ECHO MEAS - BSA(HAYCOCK): 2 M^2
BH CV ECHO MEAS - BSA: 1.9 M^2
BH CV ECHO MEAS - BZI_BMI: 31.6 KILOGRAMS/M^2
BH CV ECHO MEAS - BZI_METRIC_HEIGHT: 165.1 CM
BH CV ECHO MEAS - BZI_METRIC_WEIGHT: 86.2 KG
BH CV ECHO MEAS - CONTRAST EF 4CH: 56.1 ML/M^2
BH CV ECHO MEAS - EDV(CUBED): 126.6 ML
BH CV ECHO MEAS - EDV(MOD-SP4): 41 ML
BH CV ECHO MEAS - EDV(TEICH): 119.4 ML
BH CV ECHO MEAS - EF(CUBED): 75.5 %
BH CV ECHO MEAS - EF(MOD-SP4): 56.1 %
BH CV ECHO MEAS - EF(TEICH): 67.2 %
BH CV ECHO MEAS - ESV(CUBED): 31 ML
BH CV ECHO MEAS - ESV(MOD-SP4): 18 ML
BH CV ECHO MEAS - ESV(TEICH): 39.2 ML
BH CV ECHO MEAS - FS: 37.4 %
BH CV ECHO MEAS - IVS/LVPW: 1.1
BH CV ECHO MEAS - IVSD: 1 CM
BH CV ECHO MEAS - IVSS: 1.2 CM
BH CV ECHO MEAS - LA DIMENSION: 2.6 CM
BH CV ECHO MEAS - LA/AO: 0.79
BH CV ECHO MEAS - LV DIASTOLIC VOL/BSA (35-75): 21.2 ML/M^2
BH CV ECHO MEAS - LV MASS(C)D: 177.3 GRAMS
BH CV ECHO MEAS - LV MASS(C)DI: 91.6 GRAMS/M^2
BH CV ECHO MEAS - LV MASS(C)S: 145 GRAMS
BH CV ECHO MEAS - LV MASS(C)SI: 74.9 GRAMS/M^2
BH CV ECHO MEAS - LV SYSTOLIC VOL/BSA (12-30): 9.3 ML/M^2
BH CV ECHO MEAS - LVIDD: 5 CM
BH CV ECHO MEAS - LVIDS: 3.1 CM
BH CV ECHO MEAS - LVLD AP4: 6.2 CM
BH CV ECHO MEAS - LVLS AP4: 5.6 CM
BH CV ECHO MEAS - LVOT AREA (M): 2.8 CM^2
BH CV ECHO MEAS - LVOT AREA: 2.9 CM^2
BH CV ECHO MEAS - LVOT DIAM: 1.9 CM
BH CV ECHO MEAS - LVPWD: 0.93 CM
BH CV ECHO MEAS - LVPWS: 1.6 CM
BH CV ECHO MEAS - MV A MAX VEL: 102.2 CM/SEC
BH CV ECHO MEAS - MV E MAX VEL: 62.7 CM/SEC
BH CV ECHO MEAS - MV E/A: 0.61
BH CV ECHO MEAS - PA ACC SLOPE: 1342 CM/SEC^2
BH CV ECHO MEAS - PA ACC TIME: 0.09 SEC
BH CV ECHO MEAS - PA PR(ACCEL): 39.4 MMHG
BH CV ECHO MEAS - RAP SYSTOLE: 10 MMHG
BH CV ECHO MEAS - RVSP: 36.6 MMHG
BH CV ECHO MEAS - SI(AO): 134.9 ML/M^2
BH CV ECHO MEAS - SI(CUBED): 49.4 ML/M^2
BH CV ECHO MEAS - SI(MOD-SP4): 11.9 ML/M^2
BH CV ECHO MEAS - SI(TEICH): 41.5 ML/M^2
BH CV ECHO MEAS - SV(AO): 261.2 ML
BH CV ECHO MEAS - SV(CUBED): 95.6 ML
BH CV ECHO MEAS - SV(MOD-SP4): 23 ML
BH CV ECHO MEAS - SV(TEICH): 80.2 ML
BH CV ECHO MEAS - TR MAX VEL: 257.9 CM/SEC
BUN BLD-MCNC: 16 MG/DL (ref 7–21)
BUN/CREAT SERPL: 18.6 (ref 7–25)
CALCIUM SPEC-SCNC: 8.6 MG/DL (ref 7.7–10)
CHLORIDE SERPL-SCNC: 104 MMOL/L (ref 99–112)
CO2 SERPL-SCNC: 29.8 MMOL/L (ref 24.3–31.9)
CREAT BLD-MCNC: 0.86 MG/DL (ref 0.43–1.29)
DEPRECATED RDW RBC AUTO: 47.2 FL (ref 37–54)
ERYTHROCYTE [DISTWIDTH] IN BLOOD BY AUTOMATED COUNT: 15.2 % (ref 11.5–14.5)
GFR SERPL CREATININE-BSD FRML MDRD: 65 ML/MIN/1.73
GLUCOSE BLD-MCNC: 140 MG/DL (ref 70–110)
HCT VFR BLD AUTO: 35.5 % (ref 37–47)
HGB BLD-MCNC: 11.5 G/DL (ref 12–16)
MAXIMAL PREDICTED HEART RATE: 148 BPM
MCH RBC QN AUTO: 28 PG (ref 27–33)
MCHC RBC AUTO-ENTMCNC: 32.4 G/DL (ref 33–37)
MCV RBC AUTO: 86.4 FL (ref 80–94)
OSMOLALITY SERPL CALC.SUM OF ELEC: 284.8 MOSM/KG (ref 273–305)
PLATELET # BLD AUTO: 145 10*3/MM3 (ref 130–400)
PMV BLD AUTO: 12.1 FL (ref 6–10)
POTASSIUM BLD-SCNC: 3 MMOL/L (ref 3.5–5.3)
RBC # BLD AUTO: 4.11 10*6/MM3 (ref 4.2–5.4)
SODIUM BLD-SCNC: 141 MMOL/L (ref 135–153)
STRESS TARGET HR: 126 BPM
WBC NRBC COR # BLD: 7.28 10*3/MM3 (ref 4.5–12.5)

## 2018-05-17 PROCEDURE — 93306 TTE W/DOPPLER COMPLETE: CPT | Performed by: INTERNAL MEDICINE

## 2018-05-17 PROCEDURE — 80048 BASIC METABOLIC PNL TOTAL CA: CPT | Performed by: INTERNAL MEDICINE

## 2018-05-17 PROCEDURE — 63710000001 ASPIRIN 81 MG TABLET DELAYED-RELEASE: Performed by: INTERNAL MEDICINE

## 2018-05-17 PROCEDURE — A9270 NON-COVERED ITEM OR SERVICE: HCPCS | Performed by: INTERNAL MEDICINE

## 2018-05-17 PROCEDURE — 63710000001 METOPROLOL TARTRATE 25 MG TABLET: Performed by: INTERNAL MEDICINE

## 2018-05-17 PROCEDURE — 94799 UNLISTED PULMONARY SVC/PX: CPT

## 2018-05-17 PROCEDURE — 63710000001 ALPRAZOLAM 0.5 MG TABLET: Performed by: INTERNAL MEDICINE

## 2018-05-17 PROCEDURE — 63710000001 PANTOPRAZOLE 40 MG TABLET DELAYED-RELEASE: Performed by: INTERNAL MEDICINE

## 2018-05-17 PROCEDURE — 63710000001 LEVOTHYROXINE 125 MCG TABLET: Performed by: INTERNAL MEDICINE

## 2018-05-17 PROCEDURE — 93010 ELECTROCARDIOGRAM REPORT: CPT | Performed by: INTERNAL MEDICINE

## 2018-05-17 PROCEDURE — 63710000001 LISINOPRIL 2.5 MG TABLET: Performed by: INTERNAL MEDICINE

## 2018-05-17 PROCEDURE — 99214 OFFICE O/P EST MOD 30 MIN: CPT | Performed by: INTERNAL MEDICINE

## 2018-05-17 PROCEDURE — 63710000001 OXYCODONE-ACETAMINOPHEN 10-325 MG TABLET: Performed by: INTERNAL MEDICINE

## 2018-05-17 PROCEDURE — 93005 ELECTROCARDIOGRAM TRACING: CPT | Performed by: INTERNAL MEDICINE

## 2018-05-17 PROCEDURE — 93306 TTE W/DOPPLER COMPLETE: CPT

## 2018-05-17 PROCEDURE — 85027 COMPLETE CBC AUTOMATED: CPT | Performed by: INTERNAL MEDICINE

## 2018-05-17 RX ADMIN — OXYCODONE HYDROCHLORIDE AND ACETAMINOPHEN 1 TABLET: 10; 325 TABLET ORAL at 05:14

## 2018-05-17 RX ADMIN — METOPROLOL TARTRATE 25 MG: 25 TABLET, FILM COATED ORAL at 08:08

## 2018-05-17 RX ADMIN — LEVOTHYROXINE SODIUM 125 MCG: 0.12 TABLET ORAL at 08:08

## 2018-05-17 RX ADMIN — PANTOPRAZOLE SODIUM 40 MG: 40 TABLET, DELAYED RELEASE ORAL at 08:08

## 2018-05-17 RX ADMIN — ALPRAZOLAM 0.5 MG: 0.5 TABLET ORAL at 11:59

## 2018-05-17 RX ADMIN — ASPIRIN 81 MG: 81 TABLET ORAL at 08:08

## 2018-05-17 RX ADMIN — LISINOPRIL 5 MG: 2.5 TABLET ORAL at 08:08

## 2018-05-17 RX ADMIN — OXYCODONE HYDROCHLORIDE AND ACETAMINOPHEN 1 TABLET: 10; 325 TABLET ORAL at 11:59

## 2018-05-17 NOTE — PROGRESS NOTES
Discharge Planning Assessment   [facility]     Patient Name: Indigo Sorenson  MRN: [id number]  Today's Date: 5/17/2018    Admit Date: 5/16/2018          Discharge Needs Assessment     Row Name 05/17/18 1018       Living Environment    Lives With alone    Current Living Arrangements independent/assisted living facility   Lives in [facility].    Primary Care Provided by self    Provides Primary Care For no one    Family Caregiver if Needed child(michele), adult   Her 2 sons assist as needed.    Quality of Family Relationships helpful;involved    Able to Return to Prior Arrangements yes       Resource/Environmental Concerns    Resource/Environmental Concerns none    Transportation Concerns --   Sons provide all transportation. She does not drive.       Transition Planning    Patient/Family Anticipates Transition to home    Patient/Family Anticipated Services at Transition --   Denies needs.    Transportation Anticipated family or friend will provide       Discharge Needs Assessment    Readmission Within the Last 30 Days no previous admission in last 30 days    Concerns to be Addressed no discharge needs identified;denies needs/concerns at this time    Equipment Currently Used at Home none    Anticipated Changes Related to Illness none    Equipment Needed After Discharge none    Outpatient/Agency/Support Group Needs --   Denies outpatient needs.            Discharge Plan     Row Name 05/17/18 1021       Plan    Plan Patient came to ED with c/o Chest Pain. Dx: STEMI. She had a heart cath that she said was negative. She is ambulatory in room ad marlena. She has Medicare A&B and [facility] coverage. Denies medication concerns or copay issues. Dr. Madi Tidwell is her PCP. She utilizes [facility] to obtain her medications. Denies home care needs or concerns at this time. She lives in [facility]. Plans to return home at discharge. CM will follow and assist as needed.    Patient/Family in  Agreement with Plan yes        Destination     No service coordination in this encounter.      Durable Medical Equipment     No service coordination in this encounter.      Dialysis/Infusion     No service coordination in this encounter.      Home Medical Care     No service coordination in this encounter.      Social Care     No service coordination in this encounter.                Demographic Summary     Row Name 05/17/18 1016       General Information    Admission Type --   Outpatient    Arrived From home    Referral Source admission list    Reason for Consult discharge planning    Preferred Language English     Used During This Interaction no            Functional Status     Row Name 05/17/18 1016       Functional Status    Usual Activity Tolerance good    Current Activity Tolerance good       Functional Status, IADL    Laundry independent    IADL Comments Independent with IADL's, Sons assist if needed.       Mental Status    General Appearance WDL WDL       Mental Status Summary    Recent Changes in Mental Status/Cognitive Functioning no changes    Mental Status Comments States she has a h/o forgetfullness due to TIA's in the past.            Psychosocial    No documentation.           Abuse/Neglect    No documentation.           Legal    No documentation.           Substance Abuse    No documentation.           Patient Forms    No documentation.         Sharri Drew RN

## 2018-05-17 NOTE — NURSING NOTE
"RN called to room because patient stated that she didn't have a ride home.  Patient has also stated that she has been unable to urinate all day, however urine has been collected in hat all day by aide.  She was assisted to bathroom x 1 by RN today as well.  Patient bladder scanned at this time with 34ml's noted to be in bladder.  When RN was assessing patient for urination needs, she stated it was \"none of your business, she was staying and calling our \". Lead nurse made aware. MD called as well. MD ok with continuing discharge. Patient made aware.  "

## 2018-05-17 NOTE — NURSING NOTE
Attempted to call family about ride home, one number is disconnected and left a message on voicemail for the other son.

## 2018-05-17 NOTE — PLAN OF CARE
Problem: Patient Care Overview  Goal: Plan of Care Review  Outcome: Ongoing (interventions implemented as appropriate)      Problem: Pain, Acute (Adult)  Goal: Identify Related Risk Factors and Signs and Symptoms  Outcome: Ongoing (interventions implemented as appropriate)    Goal: Acceptable Pain Control/Comfort Level  Outcome: Ongoing (interventions implemented as appropriate)

## 2018-05-17 NOTE — DISCHARGE SUMMARY
Patient Identification:  Name:  Indigo Sorenson  Age:  72 y.o.  Sex:  female  :  1945  MRN:  [id number]  Visit Number:  [id number]    Date of Admission: 2018  Date of Discharge:  2018     PCP: Madi Tidwell MD    DISCHARGE DIAGNOSIS  Acute chest pain which was initially felt to be inferior STEMI  Cardiac catheter revealed normal coronary arteries angiographically thus no STEMI  Similar chest pain episode in December at which time cardiac catheter was also performed and was normal  Hypertension  Hypothyroidism on replacement  Anxiety and depression being treated      CONSULTS   None    PROCEDURES PERFORMED  Emergent cardiac with coronary angiography    HOSPITAL COURSE  Patient is a 72 y.o. female presented to [facility] complaining of chest pain episode which began 24 hour prior but became severe.  Please see the admitting history and physical for further details.      VITAL SIGNS:  1    18  1400 18  0246   Weight: 88.5 kg (195 lb) 86.5 kg (190 lb 12.8 oz)     Body mass index is 31.75 kg/m².    PHYSICAL EXAM:    DISCHARGE DISPOSITION   Stable    DISCHARGE MEDICATIONS:  As per the med rec  No new meds prescribed  Home meds reviewed- continue all other home meds    Follow Up in 7-10 days in the interventional cardiology clinic      Your Scheduled Appointments    2018 10:00 AM EDT  Medicine Check with Mikaela Key MD  [facility] (--) [address] [facility] [address]  [phone]          Additional Instructions for the Follow-ups that You Need to Schedule     Discharge Follow-up with Specified Provider: dr Lamb; 2 Weeks    As directed      To:  dr Lamb    Follow Up:  2 Weeks           Follow-up Information     Madi Tidwell MD .    Specialty:  Family Medicine  Contact information:  [address] [place] [id number]  [phone]                    Von Lamb MD  18  4:13 PM    Please note that this discharge summary  required more than 30 minutes to complete.

## 2018-05-17 NOTE — PLAN OF CARE
Problem: Patient Care Overview  Goal: Plan of Care Review  Outcome: Ongoing (interventions implemented as appropriate)   05/16/18 1918 05/16/18 3158   Coping/Psychosocial   Plan of Care Reviewed With patient;spouse --    Plan of Care Review   Progress --  improving     Goal: Individualization and Mutuality  Outcome: Ongoing (interventions implemented as appropriate)      Problem: Cardiac Catheterization (Diagnostic/Interventional) (Adult)  Goal: Signs and Symptoms of Listed Potential Problems Will be Absent, Minimized or Managed (Cardiac Catheterization)  Outcome: Outcome(s) achieved Date Met: 05/16/18    Goal: Anesthesia/Sedation Recovery  Outcome: Outcome(s) achieved Date Met: 05/16/18      Problem: Pain, Acute (Adult)  Goal: Identify Related Risk Factors and Signs and Symptoms  Outcome: Ongoing (interventions implemented as appropriate)    Goal: Acceptable Pain Control/Comfort Level  Outcome: Ongoing (interventions implemented as appropriate)

## 2018-05-24 ENCOUNTER — TRANSCRIBE ORDERS (OUTPATIENT)
Dept: ADMINISTRATIVE | Facility: HOSPITAL | Age: 73
End: 2018-05-24

## 2018-05-24 DIAGNOSIS — R41.3 MEMORY LOSS: Primary | ICD-10-CM

## 2018-06-06 NOTE — TELEPHONE ENCOUNTER
RX request, patient called stating she went to [employer] with her son the other day and left her medications down there states she has called [facility] seeing if anyone has returned them states they do not have them states she has been out for 3 days now.

## 2018-06-08 RX ORDER — QUETIAPINE FUMARATE 300 MG/1
300 TABLET, FILM COATED ORAL NIGHTLY
Qty: 30 TABLET | Refills: 2 | Status: SHIPPED | OUTPATIENT
Start: 2018-06-08 | End: 2018-09-05 | Stop reason: SDUPTHER

## 2018-06-08 RX ORDER — AMITRIPTYLINE HYDROCHLORIDE 100 MG/1
100 TABLET, FILM COATED ORAL NIGHTLY
Qty: 30 TABLET | Refills: 2 | Status: SHIPPED | OUTPATIENT
Start: 2018-06-08 | End: 2018-09-05 | Stop reason: SDUPTHER

## 2018-06-11 ENCOUNTER — HOSPITAL ENCOUNTER (OUTPATIENT)
Dept: MRI IMAGING | Facility: HOSPITAL | Age: 73
Discharge: HOME | End: 2018-06-11
Admitting: FAMILY MEDICINE

## 2018-06-11 DIAGNOSIS — R41.3 MEMORY LOSS: ICD-10-CM

## 2018-06-11 PROCEDURE — 70551 MRI BRAIN STEM W/O DYE: CPT | Performed by: RADIOLOGY

## 2018-06-11 PROCEDURE — 70551 MRI BRAIN STEM W/O DYE: CPT

## 2018-08-15 ENCOUNTER — HOSPITAL ENCOUNTER (OUTPATIENT)
Dept: MAMMOGRAPHY | Facility: HOSPITAL | Age: 73
Discharge: HOME | End: 2018-08-15
Admitting: FAMILY MEDICINE

## 2018-08-15 DIAGNOSIS — Z12.31 VISIT FOR SCREENING MAMMOGRAM: ICD-10-CM

## 2018-08-15 PROCEDURE — 77063 BREAST TOMOSYNTHESIS BI: CPT | Performed by: RADIOLOGY

## 2018-08-15 PROCEDURE — 77067 SCR MAMMO BI INCL CAD: CPT

## 2018-08-15 PROCEDURE — 77067 SCR MAMMO BI INCL CAD: CPT | Performed by: RADIOLOGY

## 2018-08-15 PROCEDURE — 77063 BREAST TOMOSYNTHESIS BI: CPT

## 2018-09-05 RX ORDER — QUETIAPINE FUMARATE 300 MG/1
300 TABLET, FILM COATED ORAL NIGHTLY
Qty: 30 TABLET | Refills: 2 | Status: SHIPPED | OUTPATIENT
Start: 2018-09-05

## 2018-09-05 RX ORDER — AMITRIPTYLINE HYDROCHLORIDE 100 MG/1
100 TABLET, FILM COATED ORAL NIGHTLY
Qty: 30 TABLET | Refills: 0 | Status: SHIPPED | OUTPATIENT
Start: 2018-09-05

## 2018-09-16 ENCOUNTER — HOSPITAL ENCOUNTER (EMERGENCY)
Facility: HOSPITAL | Age: 73
Discharge: HOME | End: 2018-09-16
Admitting: EMERGENCY MEDICINE

## 2018-09-16 VITALS
TEMPERATURE: 98.4 F | BODY MASS INDEX: 30.73 KG/M2 | SYSTOLIC BLOOD PRESSURE: 142 MMHG | WEIGHT: 180 LBS | DIASTOLIC BLOOD PRESSURE: 68 MMHG | RESPIRATION RATE: 18 BRPM | HEIGHT: 64 IN | OXYGEN SATURATION: 99 % | HEART RATE: 89 BPM
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
## 2018-09-16 PROCEDURE — 99282 EMERGENCY DEPT VISIT SF MDM: CPT

## 2018-09-16 RX ORDER — DOXYCYCLINE 100 MG/1
100 CAPSULE ORAL 2 TIMES DAILY
Qty: 20 CAPSULE | Refills: 0 | Status: SHIPPED | OUTPATIENT
Start: 2018-09-16

## 2018-09-16 NOTE — ED PROVIDER NOTES
Subjective     History provided by:  Patient   used: No    Insect Bite   Contact animal:  Unable to specify  Location:  Foot  Foot injury location:  R foot  Time since incident:  1 day  Incident location:  Home  Provoked: unprovoked    Notifications:  None  Animal's rabies vaccination status:  Never received  Animal in possession: no    Tetanus status:  Up to date  Relieved by:  Nothing  Worsened by:  Nothing  Ineffective treatments:  None tried  Associated symptoms: swelling    Associated symptoms: no fever, no numbness and no rash        Review of Systems   Constitutional: Negative.  Negative for fever.   HENT: Negative.    Eyes: Negative.    Respiratory: Negative.    Cardiovascular: Negative.    Gastrointestinal: Negative.    Endocrine: Negative.    Genitourinary: Negative.    Musculoskeletal: Negative.    Skin: Negative.  Negative for rash.   Allergic/Immunologic: Negative.    Neurological: Negative.  Negative for numbness.   Hematological: Negative.    Psychiatric/Behavioral: Negative.        Past Medical History:   Diagnosis Date   • Anxiety    • Coronary artery disease    • Degenerative disorder of bone    • Depression    • Disease of thyroid gland    • Fall 09/2017   • Fibromyalgia    • GERD (gastroesophageal reflux disease)    • Hyperlipidemia    • Hypertension    • Injury of back    • Migraine        Allergies   Allergen Reactions   • Procaine Shortness Of Breath       Past Surgical History:   Procedure Laterality Date   • APPENDECTOMY     • BREAST BIOPSY Right     2008   • CARDIAC CATHETERIZATION     • CARDIAC CATHETERIZATION     • CARDIAC CATHETERIZATION N/A 5/16/2018    Procedure: Left Heart Cath;  Surgeon: Von Lamb MD;  Location: [facility] INVASIVE LOCATION;  Service: Cardiology   • CHOLECYSTECTOMY     • ENDOSCOPY N/A 7/5/2016    Procedure: ESOPHAGOGASTRODUODENOSCOPY;  Surgeon: Ziggy Sprague III, MD;  Location: [facility] OR;  Service:    • ENDOSCOPY N/A 7/5/2016     Procedure: ESOPHAGOGASTRODUODENOSCOPY WITH DILATATION;  Surgeon: Ziggy Sprague III, MD;  Location: [facility] OR;  Service:    • HYSTERECTOMY         Family History   Problem Relation Age of Onset   • Cancer Mother         Pancreatic cancer   • Cancer Father    • Heart disease Brother    • Breast cancer Maternal Aunt    • Breast cancer Maternal Aunt        Social History     Social History   • Marital status: Single     Social History Main Topics   • Smoking status: Never Smoker   • Smokeless tobacco: Never Used   • Alcohol use No   • Drug use: No   • Sexual activity: Defer     Other Topics Concern   • Not on file           Objective   Physical Exam   Constitutional: She is oriented to person, place, and time. She appears well-developed and well-nourished.   HENT:   Head: Normocephalic.   Right Ear: External ear normal.   Left Ear: External ear normal.   Mouth/Throat: Oropharynx is clear and moist.   Eyes: Pupils are equal, round, and reactive to light. EOM are normal.   Neck: Normal range of motion.   Cardiovascular: Normal rate, regular rhythm and normal heart sounds.    Pulmonary/Chest: Effort normal and breath sounds normal.   Abdominal: Soft. Bowel sounds are normal.   Musculoskeletal: Normal range of motion.   Neurological: She is alert and oriented to person, place, and time.   Skin: Skin is warm and dry. Capillary refill takes less than 2 seconds.   3 small areas of erythema on dorsum of left foot     Psychiatric: She has a normal mood and affect. Her behavior is normal.   Nursing note and vitals reviewed.      Procedures           ED Course                  MDM      Final diagnoses:   Insect bite, initial encounter            Josr Alvarado, APRN  09/16/18 [id number]

## 2018-09-18 ENCOUNTER — EPISODE CHANGES (OUTPATIENT)
Dept: CASE MANAGEMENT | Facility: OTHER | Age: 73
End: 2018-09-18

## (undated) DEVICE — RUNWAY RADL W/TOP PAD

## (undated) DEVICE — GW INQW FIX/CORE PTFE J/3MM .035 260CM

## (undated) DEVICE — HI-TORQUE WHISPER MS GUIDE WIRE .014 STRAIGHT TIP 3.0 CM X 190 CM: Brand: HI-TORQUE WHISPER

## (undated) DEVICE — Device

## (undated) DEVICE — ST INF PRI SMRTSTE 20DRP 2VLV 24ML 117

## (undated) DEVICE — ADULT DISPOSABLE SINGLE-PATIENT USE PULSE OXIMETER SENSOR: Brand: NONIN

## (undated) DEVICE — ADULT, RADIOTRANSPARENT ELEMENT, COMPATIBLE W/ ZOLL: Brand: DEFIBRILLATION ELECTRODES

## (undated) DEVICE — Device: Brand: MEDEX

## (undated) DEVICE — DEV INFL MONARCH 25W

## (undated) DEVICE — DRAPE, RADIAL, STERILE: Brand: MEDLINE

## (undated) DEVICE — 6F .070 JR 4 100CM: Brand: CORDIS

## (undated) DEVICE — PK CATH CARD 70

## (undated) DEVICE — MINI TREK CORONARY DILATATION CATHETER 2.0 MM X 20 MM / RAPID-EXCHANGE: Brand: MINI TREK

## (undated) DEVICE — ST EXT IV SMARTSITE 2VLV SP M LL 5ML IV1

## (undated) DEVICE — LN INJ CONTRST FLXCIL HP F/M LL 1200PSI10

## (undated) DEVICE — CANNULA,OXY,ADULT,SUPER SOFT,W/14'TUB,UC: Brand: MEDLINE INDUSTRIES, INC.

## (undated) DEVICE — MYNXGRIP 6F/7F: Brand: MYNXGRIP

## (undated) DEVICE — GLIDESHEATH SLENDER STAINLESS STEEL KIT: Brand: GLIDESHEATH SLENDER

## (undated) DEVICE — SHEATH INTRO SUPERSHEATH JWIRE .035 6F 11CM

## (undated) DEVICE — CATH F5 INF JL 4 100CM: Brand: INFINITI